# Patient Record
Sex: MALE | Race: OTHER | Employment: UNEMPLOYED | URBAN - METROPOLITAN AREA
[De-identification: names, ages, dates, MRNs, and addresses within clinical notes are randomized per-mention and may not be internally consistent; named-entity substitution may affect disease eponyms.]

---

## 2020-10-28 ENCOUNTER — HOSPITAL ENCOUNTER (EMERGENCY)
Age: 65
Discharge: HOME | End: 2020-10-29

## 2020-10-28 ENCOUNTER — APPOINTMENT (OUTPATIENT)
Dept: CT IMAGING | Age: 65
End: 2020-10-28

## 2020-10-28 LAB
ALBUMIN SERPL-MCNC: 4.2 GM/DL (ref 3.4–5)
ALP BLD-CCNC: 109 IU/L (ref 40–129)
ALT SERPL-CCNC: 45 U/L (ref 10–40)
ANION GAP SERPL CALCULATED.3IONS-SCNC: 13 MMOL/L (ref 4–16)
AST SERPL-CCNC: 36 IU/L (ref 15–37)
BACTERIA: NEGATIVE /HPF
BASOPHILS ABSOLUTE: 0 K/CU MM
BASOPHILS RELATIVE PERCENT: 0.5 % (ref 0–1)
BILIRUB SERPL-MCNC: 0.3 MG/DL (ref 0–1)
BILIRUBIN URINE: NEGATIVE MG/DL
BLOOD, URINE: ABNORMAL
BUN BLDV-MCNC: 20 MG/DL (ref 6–23)
CALCIUM SERPL-MCNC: 9.7 MG/DL (ref 8.3–10.6)
CHLORIDE BLD-SCNC: 101 MMOL/L (ref 99–110)
CLARITY: ABNORMAL
CO2: 24 MMOL/L (ref 21–32)
COLOR: ABNORMAL
CREAT SERPL-MCNC: 1 MG/DL (ref 0.9–1.3)
DIFFERENTIAL TYPE: ABNORMAL
EOSINOPHILS ABSOLUTE: 0.1 K/CU MM
EOSINOPHILS RELATIVE PERCENT: 2.2 % (ref 0–3)
GFR AFRICAN AMERICAN: >60 ML/MIN/1.73M2
GFR NON-AFRICAN AMERICAN: >60 ML/MIN/1.73M2
GLUCOSE BLD-MCNC: 103 MG/DL (ref 70–99)
GLUCOSE, URINE: 50 MG/DL
HCT VFR BLD CALC: 43.2 % (ref 42–52)
HEMOGLOBIN: 13.9 GM/DL (ref 13.5–18)
IMMATURE NEUTROPHIL %: 0.5 % (ref 0–0.43)
KETONES, URINE: NEGATIVE MG/DL
LEUKOCYTE ESTERASE, URINE: NEGATIVE
LYMPHOCYTES ABSOLUTE: 1.9 K/CU MM
LYMPHOCYTES RELATIVE PERCENT: 30.6 % (ref 24–44)
MCH RBC QN AUTO: 28.9 PG (ref 27–31)
MCHC RBC AUTO-ENTMCNC: 32.2 % (ref 32–36)
MCV RBC AUTO: 89.8 FL (ref 78–100)
MONOCYTES ABSOLUTE: 0.5 K/CU MM
MONOCYTES RELATIVE PERCENT: 7.1 % (ref 0–4)
NITRITE URINE, QUANTITATIVE: NEGATIVE
NUCLEATED RBC %: 0 %
PDW BLD-RTO: 14 % (ref 11.7–14.9)
PH, URINE: 6 (ref 5–8)
PLATELET # BLD: 300 K/CU MM (ref 140–440)
PMV BLD AUTO: 9.6 FL (ref 7.5–11.1)
POTASSIUM SERPL-SCNC: 3.6 MMOL/L (ref 3.5–5.1)
PROTEIN UA: 100 MG/DL
RBC # BLD: 4.81 M/CU MM (ref 4.6–6.2)
RBC URINE: ABNORMAL /HPF (ref 0–3)
SEGMENTED NEUTROPHILS ABSOLUTE COUNT: 3.7 K/CU MM
SEGMENTED NEUTROPHILS RELATIVE PERCENT: 59.1 % (ref 36–66)
SODIUM BLD-SCNC: 138 MMOL/L (ref 135–145)
SPECIFIC GRAVITY UA: 1.01 (ref 1–1.03)
TOTAL IMMATURE NEUTOROPHIL: 0.03 K/CU MM
TOTAL NUCLEATED RBC: 0 K/CU MM
TOTAL PROTEIN: 8.1 GM/DL (ref 6.4–8.2)
TRICHOMONAS: ABNORMAL /HPF
UROBILINOGEN, URINE: NORMAL MG/DL (ref 0.2–1)
WBC # BLD: 6.3 K/CU MM (ref 4–10.5)
WBC UA: 654 /HPF (ref 0–2)

## 2020-10-28 PROCEDURE — 81001 URINALYSIS AUTO W/SCOPE: CPT

## 2020-10-28 PROCEDURE — 87077 CULTURE AEROBIC IDENTIFY: CPT

## 2020-10-28 PROCEDURE — 96361 HYDRATE IV INFUSION ADD-ON: CPT

## 2020-10-28 PROCEDURE — 4500000027

## 2020-10-28 PROCEDURE — 85025 COMPLETE CBC W/AUTO DIFF WBC: CPT

## 2020-10-28 PROCEDURE — 99284 EMERGENCY DEPT VISIT MOD MDM: CPT

## 2020-10-28 PROCEDURE — 2580000003 HC RX 258: Performed by: PHYSICIAN ASSISTANT

## 2020-10-28 PROCEDURE — 6360000004 HC RX CONTRAST MEDICATION: Performed by: PHYSICIAN ASSISTANT

## 2020-10-28 PROCEDURE — 87086 URINE CULTURE/COLONY COUNT: CPT

## 2020-10-28 PROCEDURE — 80053 COMPREHEN METABOLIC PANEL: CPT

## 2020-10-28 PROCEDURE — 96360 HYDRATION IV INFUSION INIT: CPT

## 2020-10-28 PROCEDURE — 74177 CT ABD & PELVIS W/CONTRAST: CPT

## 2020-10-28 PROCEDURE — 87186 SC STD MICRODIL/AGAR DIL: CPT

## 2020-10-28 RX ORDER — SODIUM CHLORIDE 0.9 % (FLUSH) 0.9 %
10 SYRINGE (ML) INJECTION 2 TIMES DAILY
Status: DISCONTINUED | OUTPATIENT
Start: 2020-10-28 | End: 2020-10-29 | Stop reason: HOSPADM

## 2020-10-28 RX ORDER — SODIUM CHLORIDE 9 MG/ML
INJECTION, SOLUTION INTRAVENOUS CONTINUOUS
Status: DISCONTINUED | OUTPATIENT
Start: 2020-10-28 | End: 2020-10-29 | Stop reason: HOSPADM

## 2020-10-28 RX ADMIN — SODIUM CHLORIDE, PRESERVATIVE FREE 10 ML: 5 INJECTION INTRAVENOUS at 23:17

## 2020-10-28 RX ADMIN — IOPAMIDOL 80 ML: 755 INJECTION, SOLUTION INTRAVENOUS at 23:17

## 2020-10-28 RX ADMIN — SODIUM CHLORIDE: 9 INJECTION, SOLUTION INTRAVENOUS at 22:13

## 2020-10-29 VITALS
HEART RATE: 83 BPM | OXYGEN SATURATION: 97 % | SYSTOLIC BLOOD PRESSURE: 174 MMHG | DIASTOLIC BLOOD PRESSURE: 110 MMHG | TEMPERATURE: 98 F | RESPIRATION RATE: 12 BRPM | WEIGHT: 157 LBS

## 2020-10-29 RX ORDER — CEPHALEXIN 500 MG/1
500 CAPSULE ORAL 4 TIMES DAILY
Qty: 28 CAPSULE | Refills: 0 | Status: ON HOLD | OUTPATIENT
Start: 2020-10-29 | End: 2020-11-01 | Stop reason: HOSPADM

## 2020-10-29 NOTE — ED NOTES
used on phone. Pt says they are having painful urination and blood in his urine. Says this has been going on for a week.  Says he maryan szymanski at this time     JIMMY Ledbetter  10/28/20 2124

## 2020-10-29 NOTE — ED NOTES
CT Results  Impression    1. No acute intra-abdominal abnormality. 2. 4.2 cm diameter bladder calculus present.  At the superior aspect of the    calculus there is a intraluminal bladder 4.2 cm soft tissue mass.  This could    represent a possible blood clot versus possible primary malignancy. Recommend urologic consultation and cystoscopy. 3. There is a small amount of free air within the bladder which could relate    to recent instrumentation versus possible cystitis. 4. Severe prostatomegaly and evidence of bladder outlet obstruction. 5. 3.2 cm infrarenal abdominal aortic aneurysm.  Recommended follow-up every    3 years.         RECOMMENDATIONS:    For management of fusiform AAA:         3.0-3.4 cm AAA, recommend follow-up every 3 years.         * For management of saccular abdominal aortic aneurysms of any size,    recommend vascular consultation.         Note:  For AAA enlargement of >0.5 cm in 6 months or >1 cm in 1 year,    recommend vascular consultation.         References: J Am Tita Radiol 2013; 10(10):789-794; J Vasc Surg.  2018; 67:2-77                Jewels Fischer RN  10/28/20 7733

## 2020-10-29 NOTE — ED TRIAGE NOTES
Pt presents to the ED with complaints of hematuria for about 3 days. Pt states that he is not in pain currently, but does have pain when he urinates. Pt reports about a month ago, he was having trouble urinating, he went to the Dr and they drained his urine with a catheter and has not had problems since. Pt speaks [place].

## 2020-10-29 NOTE — ED PROVIDER NOTES
Triage Chief Complaint:   Hematuria    Te-Moak:  Today in the ED I had the pleasure of caring for Belia Almaguer who is a 71 y.o. male that presents today to the emergency department complaining of hematuria. Intermittently over the last 1 week however got significantly worse today. He does endorse a little bit of discomfort when he urinates. As well as urinary hesitancy. And he states that his last time trying to pee he was not able to urinate. He denies any abdominal pain or flank pain. No history of kidney stones. No trauma to the abdomen or penis. No testicular or penile pain. No recent fever chills nausea vomiting or diarrhea. ROS:  REVIEW OF SYSTEMS    At least 10 systems reviewed      All other review of systems are negative  See HPI and nursing notes for additional information       History reviewed. No pertinent past medical history. History reviewed. No pertinent surgical history. History reviewed. No pertinent family history.   Social History     Socioeconomic History    Marital status: Unknown     Spouse name: Not on file    Number of children: Not on file    Years of education: Not on file    Highest education level: Not on file   Occupational History    Not on file   Social Needs    Financial resource strain: Not on file    Food insecurity     Worry: Not on file     Inability: Not on file    Transportation needs     Medical: Not on file     Non-medical: Not on file   Tobacco Use    Smoking status: Never Smoker    Smokeless tobacco: Never Used   Substance and Sexual Activity    Alcohol use: Not Currently    Drug use: Never    Sexual activity: Not on file   Lifestyle    Physical activity     Days per week: Not on file     Minutes per session: Not on file    Stress: Not on file   Relationships    Social connections     Talks on phone: Not on file     Gets together: Not on file     Attends Scientologist service: Not on file     Active member of club or organization: Not on file Attends meetings of clubs or organizations: Not on file     Relationship status: Not on file    Intimate partner violence     Fear of current or ex partner: Not on file     Emotionally abused: Not on file     Physically abused: Not on file     Forced sexual activity: Not on file   Other Topics Concern    Not on file   Social History Narrative    Not on file     Current Facility-Administered Medications   Medication Dose Route Frequency Provider Last Rate Last Dose    0.9 % sodium chloride infusion   Intravenous Continuous Shawn Morris PA-C 100 mL/hr at 10/28/20 2213      sodium chloride flush 0.9 % injection 10 mL  10 mL Intravenous BID Shawn Morris PA-C   10 mL at 10/28/20 2317     Current Outpatient Medications   Medication Sig Dispense Refill    cephALEXin (KEFLEX) 500 MG capsule Take 1 capsule by mouth 4 times daily for 7 days 28 capsule 0     No Known Allergies    Nursing Notes Reviewed    Physical Exam:  ED Triage Vitals [10/28/20 2016]   Enc Vitals Group      BP (!) 207/122      Pulse 65      Resp 18      Temp 98 °F (36.7 °C)      Temp Source Oral      SpO2 99 %      Weight 157 lb (71.2 kg)      Height       Head Circumference       Peak Flow       Pain Score       Pain Loc       Pain Edu? Excl. in [address]? General :Patient is awake alert oriented person place and time no acute distress nontoxic appearing  HEENT: Pupils are equally round and reactive to light extraocular motors are intact conjunctivae clear sclerae white there is no injection no icterus. Nose without any rhinorrhea or epistaxis. Oral mucosa is moist no exudate buccal mucosa shows no ulcerations. Uvula is midline    Neck: Neck is supple full range of motion trachea midline thyroid nonpalpable  Cardiac: Heart regular rate rhythm no murmurs rubs clicks or gallops  Lungs: Lungs are clear to auscultation there is no wheezing rhonchi or rales. There is no use of accessory muscles no nasal flaring identified.   Chest wall: There is no tenderness to palpation over the chest wall or over ribs  Abdomen: Abdomen is soft nontender nondistended. There is no firm or pulsatile masses no rebound rigidity or guarding negative Loya's negative McBurney, no peritoneal signs  Suprapubic:  there is no tenderness to palpation over the external bladder   Musculoskeletal: 5 out of 5 strength in all 4 extremities full flexion extension abduction and adduction supination pronation of all extremities and all digits. No obvious muscle atrophy is noted. No focal muscle deficits are appreciated  Dermatology: Skin is warm and dry there is no obvious abscesses lacerations or lesions noted  Psych: Mentation is grossly normal cognition is grossly normal. Affect is appropriate  Neuro: Motor intact sensory intact cranial nerves II through XII are intact level of consciousness is normal cerebellar function is normal reflexes are grossly normal. No evidence of incontinence or loss of bowel or bladder no saddle anesthesia noted Lymphatic: There is no submandibular or cervical adenopathy appreciated.         I have reviewed and interpreted all of the currently available lab results from this visit (if applicable):  Results for orders placed or performed during the hospital encounter of 10/28/20   Urinalysis with microscopic   Result Value Ref Range    Color, UA RED (A) YELLOW    Clarity, UA SLIGHTLY CLOUDY (A) CLEAR    Glucose, Urine 50 (A) NEGATIVE MG/DL    Bilirubin Urine NEGATIVE NEGATIVE MG/DL    Ketones, Urine NEGATIVE NEGATIVE MG/DL    Specific Gravity, UA 1.015 1.001 - 1.035    Blood, Urine MODERATE (A) NEGATIVE    pH, Urine 6.0 5.0 - 8.0    Protein,  (A) NEGATIVE MG/DL    Urobilinogen, Urine NORMAL 0.2 - 1.0 MG/DL    Nitrite Urine, Quantitative NEGATIVE NEGATIVE    Leukocyte Esterase, Urine NEGATIVE NEGATIVE    RBC, UA 57,225 (H) 0 - 3 /HPF    WBC,  (H) 0 - 2 /HPF    Bacteria, UA NEGATIVE NEGATIVE /HPF    Trichomonas, UA NONE SEEN NONE SEEN /HPF   CBC auto diff   Result Value Ref Range    WBC 6.3 4.0 - 10.5 K/CU MM    RBC 4.81 4.6 - 6.2 M/CU MM    Hemoglobin 13.9 13.5 - 18.0 GM/DL    Hematocrit 43.2 42 - 52 %    MCV 89.8 78 - 100 FL    MCH 28.9 27 - 31 PG    MCHC 32.2 32.0 - 36.0 %    RDW 14.0 11.7 - 14.9 %    Platelets 419 380 - 296 K/CU MM    MPV 9.6 7.5 - 11.1 FL    Differential Type AUTOMATED DIFFERENTIAL     Segs Relative 59.1 36 - 66 %    Lymphocytes % 30.6 24 - 44 %    Monocytes % 7.1 (H) 0 - 4 %    Eosinophils % 2.2 0 - 3 %    Basophils % 0.5 0 - 1 %    Segs Absolute 3.7 K/CU MM    Lymphocytes Absolute 1.9 K/CU MM    Monocytes Absolute 0.5 K/CU MM    Eosinophils Absolute 0.1 K/CU MM    Basophils Absolute 0.0 K/CU MM    Nucleated RBC % 0.0 %    Total Nucleated RBC 0.0 K/CU MM    Total Immature Neutrophil 0.03 K/CU MM    Immature Neutrophil % 0.5 (H) 0 - 0.43 %   CMP   Result Value Ref Range    Sodium 138 135 - 145 MMOL/L    Potassium 3.6 3.5 - 5.1 MMOL/L    Chloride 101 99 - 110 mMol/L    CO2 24 21 - 32 MMOL/L    BUN 20 6 - 23 MG/DL    CREATININE 1.0 0.9 - 1.3 MG/DL    Glucose 103 (H) 70 - 99 MG/DL    Calcium 9.7 8.3 - 10.6 MG/DL    Alb 4.2 3.4 - 5.0 GM/DL    Total Protein 8.1 6.4 - 8.2 GM/DL    Total Bilirubin 0.3 0.0 - 1.0 MG/DL    ALT 45 (H) 10 - 40 U/L    AST 36 15 - 37 IU/L    Alkaline Phosphatase 109 40 - 129 IU/L    GFR Non-African American >60 >60 mL/min/1.73m2    GFR African American >60 >60 mL/min/1.73m2    Anion Gap 13 4 - 16      Radiographs (if obtained):  [] The following radiograph was interpreted by myself in the absence of a radiologist:   [] Radiologist's Report Reviewed:  CT ABDOMEN PELVIS W IV CONTRAST   Preliminary Result   1. No acute intra-abdominal abnormality. 2. 4.2 cm diameter bladder calculus present. At the superior aspect of the   calculus there is a intraluminal bladder 4.2 cm soft tissue mass. This could   represent a possible blood clot versus possible primary malignancy.    Recommend urologic consultation and this possible mass. Patient is also educated that should he develop retention he is to come right back here to the ED       I independently managed patient today in the ED        BP (!) 183/109   Pulse 70   Temp 98 °F (36.7 °C) (Oral)   Resp 12   Wt 157 lb (71.2 kg)   SpO2 100%       Clinical Impression:  1. Acute cystitis with hematuria    2. Bladder mass    3. Bladder stone    4. Enlarged prostate    5. Abdominal aortic aneurysm (AAA) without rupture [facility])        Disposition referral (if applicable):  [phone]    Call in 1 day      [facility] Emergency Department  [phone]    If symptoms worsen or persist    Disposition medications (if applicable):  New Prescriptions    CEPHALEXIN (KEFLEX) 500 MG CAPSULE    Take 1 capsule by mouth 4 times daily for 7 days         Comment: Please note this report has been produced using speech recognition software and may contain errors related to that system including errors in grammar, punctuation, and spelling, as well as words and phrases that may be inappropriate. If there are any questions or concerns please feel free to contact the dictating provider for clarification.       Royal Loredo PA-C \      Royal Lored[place], [place]  10/29/20 9727

## 2020-10-30 ENCOUNTER — HOSPITAL ENCOUNTER (INPATIENT)
Age: 65
LOS: 2 days | Discharge: HOME | DRG: 726 | End: 2020-11-01
Attending: EMERGENCY MEDICINE | Admitting: INTERNAL MEDICINE

## 2020-10-30 ENCOUNTER — ANESTHESIA (OUTPATIENT)
Dept: OPERATING ROOM | Age: 65
DRG: 726 | End: 2020-10-30

## 2020-10-30 ENCOUNTER — ANESTHESIA EVENT (OUTPATIENT)
Dept: OPERATING ROOM | Age: 65
DRG: 726 | End: 2020-10-30

## 2020-10-30 VITALS
SYSTOLIC BLOOD PRESSURE: 105 MMHG | RESPIRATION RATE: 13 BRPM | TEMPERATURE: 98.6 F | OXYGEN SATURATION: 96 % | DIASTOLIC BLOOD PRESSURE: 90 MMHG

## 2020-10-30 PROBLEM — R33.8 CLOT RETENTION OF URINE: Status: ACTIVE | Noted: 2020-10-30

## 2020-10-30 LAB
ALBUMIN SERPL-MCNC: 3.9 GM/DL (ref 3.4–5)
ALP BLD-CCNC: 98 IU/L (ref 40–129)
ALT SERPL-CCNC: 32 U/L (ref 10–40)
ANION GAP SERPL CALCULATED.3IONS-SCNC: 13 MMOL/L (ref 4–16)
AST SERPL-CCNC: 27 IU/L (ref 15–37)
BASOPHILS ABSOLUTE: 0 K/CU MM
BASOPHILS RELATIVE PERCENT: 0.3 % (ref 0–1)
BILIRUB SERPL-MCNC: 0.4 MG/DL (ref 0–1)
BUN BLDV-MCNC: 19 MG/DL (ref 6–23)
CALCIUM SERPL-MCNC: 9.4 MG/DL (ref 8.3–10.6)
CHLORIDE BLD-SCNC: 98 MMOL/L (ref 99–110)
CO2: 24 MMOL/L (ref 21–32)
CREAT SERPL-MCNC: 1.2 MG/DL (ref 0.9–1.3)
DIFFERENTIAL TYPE: ABNORMAL
EOSINOPHILS ABSOLUTE: 0 K/CU MM
EOSINOPHILS RELATIVE PERCENT: 0 % (ref 0–3)
GFR AFRICAN AMERICAN: >60 ML/MIN/1.73M2
GFR NON-AFRICAN AMERICAN: >60 ML/MIN/1.73M2
GLUCOSE BLD-MCNC: 134 MG/DL (ref 70–99)
HCT VFR BLD CALC: 40.5 % (ref 42–52)
HEMOGLOBIN: 13.1 GM/DL (ref 13.5–18)
IMMATURE NEUTROPHIL %: 0.3 % (ref 0–0.43)
INR BLD: 1.02 INDEX
LACTATE: 1.4 MMOL/L (ref 0.4–2)
LACTATE: 2.3 MMOL/L (ref 0.4–2)
LYMPHOCYTES ABSOLUTE: 0.9 K/CU MM
LYMPHOCYTES RELATIVE PERCENT: 13.7 % (ref 24–44)
MCH RBC QN AUTO: 29.1 PG (ref 27–31)
MCHC RBC AUTO-ENTMCNC: 32.3 % (ref 32–36)
MCV RBC AUTO: 90 FL (ref 78–100)
MONOCYTES ABSOLUTE: 0.3 K/CU MM
MONOCYTES RELATIVE PERCENT: 3.9 % (ref 0–4)
NUCLEATED RBC %: 0 %
PDW BLD-RTO: 13.7 % (ref 11.7–14.9)
PLATELET # BLD: 281 K/CU MM (ref 140–440)
PMV BLD AUTO: 9.5 FL (ref 7.5–11.1)
POTASSIUM SERPL-SCNC: 3.5 MMOL/L (ref 3.5–5.1)
PROTHROMBIN TIME: 12.4 SECONDS (ref 11.7–14.5)
RBC # BLD: 4.5 M/CU MM (ref 4.6–6.2)
SARS-COV-2, NAAT: NOT DETECTED
SEGMENTED NEUTROPHILS ABSOLUTE COUNT: 5.2 K/CU MM
SEGMENTED NEUTROPHILS RELATIVE PERCENT: 81.8 % (ref 36–66)
SODIUM BLD-SCNC: 135 MMOL/L (ref 135–145)
SOURCE: NORMAL
TOTAL IMMATURE NEUTOROPHIL: 0.02 K/CU MM
TOTAL NUCLEATED RBC: 0 K/CU MM
TOTAL PROTEIN: 7.3 GM/DL (ref 6.4–8.2)
WBC # BLD: 6.4 K/CU MM (ref 4–10.5)

## 2020-10-30 PROCEDURE — 1200000000 HC SEMI PRIVATE

## 2020-10-30 PROCEDURE — 85610 PROTHROMBIN TIME: CPT

## 2020-10-30 PROCEDURE — 3700000001 HC ADD 15 MINUTES (ANESTHESIA): Performed by: UROLOGY

## 2020-10-30 PROCEDURE — 80053 COMPREHEN METABOLIC PANEL: CPT

## 2020-10-30 PROCEDURE — 6360000002 HC RX W HCPCS: Performed by: EMERGENCY MEDICINE

## 2020-10-30 PROCEDURE — 2580000003 HC RX 258: Performed by: UROLOGY

## 2020-10-30 PROCEDURE — 2500000003 HC RX 250 WO HCPCS: Performed by: ANESTHESIOLOGY

## 2020-10-30 PROCEDURE — 2580000003 HC RX 258: Performed by: EMERGENCY MEDICINE

## 2020-10-30 PROCEDURE — 96365 THER/PROPH/DIAG IV INF INIT: CPT

## 2020-10-30 PROCEDURE — 3600000013 HC SURGERY LEVEL 3 ADDTL 15MIN: Performed by: UROLOGY

## 2020-10-30 PROCEDURE — 7100000000 HC PACU RECOVERY - FIRST 15 MIN: Performed by: UROLOGY

## 2020-10-30 PROCEDURE — 6370000000 HC RX 637 (ALT 250 FOR IP): Performed by: EMERGENCY MEDICINE

## 2020-10-30 PROCEDURE — 2580000003 HC RX 258: Performed by: NURSE ANESTHETIST, CERTIFIED REGISTERED

## 2020-10-30 PROCEDURE — 4500000027

## 2020-10-30 PROCEDURE — 88300 SURGICAL PATH GROSS: CPT

## 2020-10-30 PROCEDURE — 0TCB8ZZ EXTIRPATION OF MATTER FROM BLADDER, VIA NATURAL OR ARTIFICIAL OPENING ENDOSCOPIC: ICD-10-PCS | Performed by: UROLOGY

## 2020-10-30 PROCEDURE — U0002 COVID-19 LAB TEST NON-CDC: HCPCS

## 2020-10-30 PROCEDURE — 6360000002 HC RX W HCPCS: Performed by: UROLOGY

## 2020-10-30 PROCEDURE — 2709999900 HC NON-CHARGEABLE SUPPLY: Performed by: UROLOGY

## 2020-10-30 PROCEDURE — 7100000001 HC PACU RECOVERY - ADDTL 15 MIN: Performed by: UROLOGY

## 2020-10-30 PROCEDURE — 3700000000 HC ANESTHESIA ATTENDED CARE: Performed by: UROLOGY

## 2020-10-30 PROCEDURE — 6360000002 HC RX W HCPCS: Performed by: ANESTHESIOLOGY

## 2020-10-30 PROCEDURE — 99284 EMERGENCY DEPT VISIT MOD MDM: CPT

## 2020-10-30 PROCEDURE — 6360000002 HC RX W HCPCS: Performed by: NURSE ANESTHETIST, CERTIFIED REGISTERED

## 2020-10-30 PROCEDURE — 82360 CALCULUS ASSAY QUANT: CPT

## 2020-10-30 PROCEDURE — 83605 ASSAY OF LACTIC ACID: CPT

## 2020-10-30 PROCEDURE — 85025 COMPLETE CBC W/AUTO DIFF WBC: CPT

## 2020-10-30 PROCEDURE — 3600000003 HC SURGERY LEVEL 3 BASE: Performed by: UROLOGY

## 2020-10-30 RX ORDER — TAMSULOSIN HYDROCHLORIDE 0.4 MG/1
0.4 CAPSULE ORAL DAILY
Status: DISCONTINUED | OUTPATIENT
Start: 2020-10-30 | End: 2020-11-01 | Stop reason: HOSPADM

## 2020-10-30 RX ORDER — POTASSIUM CHLORIDE 20 MEQ/1
40 TABLET, EXTENDED RELEASE ORAL PRN
Status: DISCONTINUED | OUTPATIENT
Start: 2020-10-30 | End: 2020-11-01 | Stop reason: HOSPADM

## 2020-10-30 RX ORDER — POTASSIUM CHLORIDE 7.45 MG/ML
10 INJECTION INTRAVENOUS PRN
Status: DISCONTINUED | OUTPATIENT
Start: 2020-10-30 | End: 2020-11-01 | Stop reason: HOSPADM

## 2020-10-30 RX ORDER — ACETAMINOPHEN 325 MG/1
650 TABLET ORAL EVERY 6 HOURS PRN
Status: DISCONTINUED | OUTPATIENT
Start: 2020-10-30 | End: 2020-11-01 | Stop reason: HOSPADM

## 2020-10-30 RX ORDER — LIDOCAINE HYDROCHLORIDE 20 MG/ML
JELLY TOPICAL PRN
Status: DISCONTINUED | OUTPATIENT
Start: 2020-10-30 | End: 2020-11-01 | Stop reason: HOSPADM

## 2020-10-30 RX ORDER — FUROSEMIDE 10 MG/ML
10 INJECTION INTRAMUSCULAR; INTRAVENOUS ONCE
Status: COMPLETED | OUTPATIENT
Start: 2020-10-30 | End: 2020-10-30

## 2020-10-30 RX ORDER — MAGNESIUM SULFATE IN WATER 40 MG/ML
2 INJECTION, SOLUTION INTRAVENOUS PRN
Status: DISCONTINUED | OUTPATIENT
Start: 2020-10-30 | End: 2020-11-01 | Stop reason: HOSPADM

## 2020-10-30 RX ORDER — LIDOCAINE HYDROCHLORIDE 20 MG/ML
INJECTION, SOLUTION INTRAVENOUS PRN
Status: DISCONTINUED | OUTPATIENT
Start: 2020-10-30 | End: 2020-10-30 | Stop reason: SDUPTHER

## 2020-10-30 RX ORDER — DEXAMETHASONE SODIUM PHOSPHATE 4 MG/ML
INJECTION, SOLUTION INTRA-ARTICULAR; INTRALESIONAL; INTRAMUSCULAR; INTRAVENOUS; SOFT TISSUE PRN
Status: DISCONTINUED | OUTPATIENT
Start: 2020-10-30 | End: 2020-10-30 | Stop reason: SDUPTHER

## 2020-10-30 RX ORDER — FENTANYL CITRATE 50 UG/ML
25 INJECTION, SOLUTION INTRAMUSCULAR; INTRAVENOUS EVERY 5 MIN PRN
Status: DISCONTINUED | OUTPATIENT
Start: 2020-10-30 | End: 2020-10-30 | Stop reason: HOSPADM

## 2020-10-30 RX ORDER — HYDRALAZINE HYDROCHLORIDE 20 MG/ML
5 INJECTION INTRAMUSCULAR; INTRAVENOUS EVERY 10 MIN PRN
Status: DISCONTINUED | OUTPATIENT
Start: 2020-10-30 | End: 2020-10-30 | Stop reason: HOSPADM

## 2020-10-30 RX ORDER — ONDANSETRON 2 MG/ML
INJECTION INTRAMUSCULAR; INTRAVENOUS PRN
Status: DISCONTINUED | OUTPATIENT
Start: 2020-10-30 | End: 2020-10-30 | Stop reason: SDUPTHER

## 2020-10-30 RX ORDER — PROMETHAZINE HYDROCHLORIDE 25 MG/ML
6.25 INJECTION, SOLUTION INTRAMUSCULAR; INTRAVENOUS
Status: DISCONTINUED | OUTPATIENT
Start: 2020-10-30 | End: 2020-10-30 | Stop reason: HOSPADM

## 2020-10-30 RX ORDER — FENTANYL CITRATE 50 UG/ML
INJECTION, SOLUTION INTRAMUSCULAR; INTRAVENOUS PRN
Status: DISCONTINUED | OUTPATIENT
Start: 2020-10-30 | End: 2020-10-30 | Stop reason: SDUPTHER

## 2020-10-30 RX ORDER — PROPOFOL 10 MG/ML
INJECTION, EMULSION INTRAVENOUS PRN
Status: DISCONTINUED | OUTPATIENT
Start: 2020-10-30 | End: 2020-10-30 | Stop reason: SDUPTHER

## 2020-10-30 RX ORDER — ACETAMINOPHEN 650 MG/1
650 SUPPOSITORY RECTAL EVERY 6 HOURS PRN
Status: DISCONTINUED | OUTPATIENT
Start: 2020-10-30 | End: 2020-11-01 | Stop reason: HOSPADM

## 2020-10-30 RX ORDER — HYDROMORPHONE HCL 110MG/55ML
0.5 PATIENT CONTROLLED ANALGESIA SYRINGE INTRAVENOUS EVERY 5 MIN PRN
Status: DISCONTINUED | OUTPATIENT
Start: 2020-10-30 | End: 2020-10-30 | Stop reason: HOSPADM

## 2020-10-30 RX ORDER — POLYETHYLENE GLYCOL 3350 17 G/17G
17 POWDER, FOR SOLUTION ORAL DAILY PRN
Status: DISCONTINUED | OUTPATIENT
Start: 2020-10-30 | End: 2020-11-01 | Stop reason: HOSPADM

## 2020-10-30 RX ORDER — ONDANSETRON 2 MG/ML
4 INJECTION INTRAMUSCULAR; INTRAVENOUS EVERY 6 HOURS PRN
Status: DISCONTINUED | OUTPATIENT
Start: 2020-10-30 | End: 2020-11-01 | Stop reason: HOSPADM

## 2020-10-30 RX ORDER — 0.9 % SODIUM CHLORIDE 0.9 %
1000 INTRAVENOUS SOLUTION INTRAVENOUS ONCE
Status: COMPLETED | OUTPATIENT
Start: 2020-10-30 | End: 2020-10-30

## 2020-10-30 RX ORDER — MORPHINE SULFATE 2 MG/ML
2 INJECTION, SOLUTION INTRAMUSCULAR; INTRAVENOUS EVERY 5 MIN PRN
Status: DISCONTINUED | OUTPATIENT
Start: 2020-10-30 | End: 2020-10-30 | Stop reason: HOSPADM

## 2020-10-30 RX ORDER — SODIUM CHLORIDE 0.9 % (FLUSH) 0.9 %
10 SYRINGE (ML) INJECTION EVERY 12 HOURS SCHEDULED
Status: DISCONTINUED | OUTPATIENT
Start: 2020-10-30 | End: 2020-11-01 | Stop reason: HOSPADM

## 2020-10-30 RX ORDER — DEXTROSE AND SODIUM CHLORIDE 5; .45 G/100ML; G/100ML
INJECTION, SOLUTION INTRAVENOUS CONTINUOUS
Status: DISCONTINUED | OUTPATIENT
Start: 2020-10-30 | End: 2020-10-31

## 2020-10-30 RX ORDER — SODIUM CHLORIDE, SODIUM LACTATE, POTASSIUM CHLORIDE, CALCIUM CHLORIDE 600; 310; 30; 20 MG/100ML; MG/100ML; MG/100ML; MG/100ML
INJECTION, SOLUTION INTRAVENOUS CONTINUOUS PRN
Status: DISCONTINUED | OUTPATIENT
Start: 2020-10-30 | End: 2020-10-30 | Stop reason: SDUPTHER

## 2020-10-30 RX ORDER — SODIUM CHLORIDE 0.9 % (FLUSH) 0.9 %
10 SYRINGE (ML) INJECTION PRN
Status: DISCONTINUED | OUTPATIENT
Start: 2020-10-30 | End: 2020-11-01 | Stop reason: HOSPADM

## 2020-10-30 RX ORDER — PHENYLEPHRINE HYDROCHLORIDE 10 MG/ML
INJECTION INTRAVENOUS PRN
Status: DISCONTINUED | OUTPATIENT
Start: 2020-10-30 | End: 2020-10-30 | Stop reason: SDUPTHER

## 2020-10-30 RX ORDER — HYDROMORPHONE HCL 110MG/55ML
0.25 PATIENT CONTROLLED ANALGESIA SYRINGE INTRAVENOUS EVERY 5 MIN PRN
Status: DISCONTINUED | OUTPATIENT
Start: 2020-10-30 | End: 2020-10-30 | Stop reason: HOSPADM

## 2020-10-30 RX ORDER — LABETALOL HYDROCHLORIDE 5 MG/ML
5 INJECTION, SOLUTION INTRAVENOUS EVERY 10 MIN PRN
Status: DISCONTINUED | OUTPATIENT
Start: 2020-10-30 | End: 2020-10-30 | Stop reason: HOSPADM

## 2020-10-30 RX ADMIN — FENTANYL CITRATE 50 MCG: 50 INJECTION INTRAMUSCULAR; INTRAVENOUS at 16:27

## 2020-10-30 RX ADMIN — SODIUM CHLORIDE, POTASSIUM CHLORIDE, SODIUM LACTATE AND CALCIUM CHLORIDE: 600; 310; 30; 20 INJECTION, SOLUTION INTRAVENOUS at 17:29

## 2020-10-30 RX ADMIN — FENTANYL CITRATE 50 MCG: 50 INJECTION INTRAMUSCULAR; INTRAVENOUS at 16:26

## 2020-10-30 RX ADMIN — LABETALOL HYDROCHLORIDE 5 MG: 5 INJECTION, SOLUTION INTRAVENOUS at 18:53

## 2020-10-30 RX ADMIN — SODIUM CHLORIDE 1000 ML: 9 INJECTION, SOLUTION INTRAVENOUS at 08:09

## 2020-10-30 RX ADMIN — PHENYLEPHRINE HYDROCHLORIDE 50 MCG: 10 INJECTION INTRAVENOUS at 16:56

## 2020-10-30 RX ADMIN — PROPOFOL 100 MG: 10 INJECTION, EMULSION INTRAVENOUS at 16:23

## 2020-10-30 RX ADMIN — PROPOFOL 150 MG: 10 INJECTION, EMULSION INTRAVENOUS at 16:19

## 2020-10-30 RX ADMIN — FENTANYL CITRATE 50 MCG: 50 INJECTION INTRAMUSCULAR; INTRAVENOUS at 17:33

## 2020-10-30 RX ADMIN — FENTANYL CITRATE 25 MCG: 50 INJECTION INTRAMUSCULAR; INTRAVENOUS at 18:40

## 2020-10-30 RX ADMIN — CEFTRIAXONE 1 G: 1 INJECTION, POWDER, FOR SOLUTION INTRAMUSCULAR; INTRAVENOUS at 08:29

## 2020-10-30 RX ADMIN — PHENYLEPHRINE HYDROCHLORIDE 50 MCG: 10 INJECTION INTRAVENOUS at 16:58

## 2020-10-30 RX ADMIN — LIDOCAINE HYDROCHLORIDE 100 MG: 20 INJECTION, SOLUTION INTRAVENOUS at 16:19

## 2020-10-30 RX ADMIN — SODIUM CHLORIDE, POTASSIUM CHLORIDE, SODIUM LACTATE AND CALCIUM CHLORIDE: 600; 310; 30; 20 INJECTION, SOLUTION INTRAVENOUS at 16:11

## 2020-10-30 RX ADMIN — LIDOCAINE HYDROCHLORIDE 7 ML: 20 JELLY TOPICAL at 08:07

## 2020-10-30 RX ADMIN — LABETALOL HYDROCHLORIDE 5 MG: 5 INJECTION, SOLUTION INTRAVENOUS at 18:43

## 2020-10-30 RX ADMIN — FUROSEMIDE 10 MG: 10 INJECTION, SOLUTION INTRAMUSCULAR; INTRAVENOUS at 19:06

## 2020-10-30 RX ADMIN — PHENYLEPHRINE HYDROCHLORIDE 50 MCG: 10 INJECTION INTRAVENOUS at 16:53

## 2020-10-30 RX ADMIN — FENTANYL CITRATE 50 MCG: 50 INJECTION INTRAMUSCULAR; INTRAVENOUS at 17:35

## 2020-10-30 RX ADMIN — DEXAMETHASONE SODIUM PHOSPHATE 4 MG: 4 INJECTION, SOLUTION INTRAMUSCULAR; INTRAVENOUS at 16:23

## 2020-10-30 RX ADMIN — DEXTROSE AND SODIUM CHLORIDE: 5; 450 INJECTION, SOLUTION INTRAVENOUS at 19:13

## 2020-10-30 RX ADMIN — FENTANYL CITRATE 25 MCG: 50 INJECTION INTRAMUSCULAR; INTRAVENOUS at 19:05

## 2020-10-30 RX ADMIN — ONDANSETRON 4 MG: 2 INJECTION INTRAMUSCULAR; INTRAVENOUS at 16:23

## 2020-10-30 ASSESSMENT — PULMONARY FUNCTION TESTS
PIF_VALUE: 3
PIF_VALUE: 4
PIF_VALUE: 2
PIF_VALUE: 3
PIF_VALUE: 2
PIF_VALUE: 3
PIF_VALUE: 4
PIF_VALUE: 1
PIF_VALUE: 3
PIF_VALUE: 3
PIF_VALUE: 2
PIF_VALUE: 3
PIF_VALUE: 2
PIF_VALUE: 0
PIF_VALUE: 3
PIF_VALUE: 3
PIF_VALUE: 5
PIF_VALUE: 3
PIF_VALUE: 2
PIF_VALUE: 4
PIF_VALUE: 3
PIF_VALUE: 2
PIF_VALUE: 3
PIF_VALUE: 2
PIF_VALUE: 5
PIF_VALUE: 2
PIF_VALUE: 17
PIF_VALUE: 3
PIF_VALUE: 4
PIF_VALUE: 2
PIF_VALUE: 3
PIF_VALUE: 4
PIF_VALUE: 2
PIF_VALUE: 5
PIF_VALUE: 1
PIF_VALUE: 4
PIF_VALUE: 4
PIF_VALUE: 5
PIF_VALUE: 2
PIF_VALUE: 3
PIF_VALUE: 15
PIF_VALUE: 6
PIF_VALUE: 3
PIF_VALUE: 2
PIF_VALUE: 2
PIF_VALUE: 5
PIF_VALUE: 3
PIF_VALUE: 4
PIF_VALUE: 2
PIF_VALUE: 4
PIF_VALUE: 2
PIF_VALUE: 3
PIF_VALUE: 2
PIF_VALUE: 3
PIF_VALUE: 3
PIF_VALUE: 2
PIF_VALUE: 2
PIF_VALUE: 3
PIF_VALUE: 4
PIF_VALUE: 3
PIF_VALUE: 3
PIF_VALUE: 2
PIF_VALUE: 2
PIF_VALUE: 4
PIF_VALUE: 3
PIF_VALUE: 15
PIF_VALUE: 4
PIF_VALUE: 2
PIF_VALUE: 3
PIF_VALUE: 3
PIF_VALUE: 2
PIF_VALUE: 3
PIF_VALUE: 2
PIF_VALUE: 3
PIF_VALUE: 3
PIF_VALUE: 4
PIF_VALUE: 2
PIF_VALUE: 3
PIF_VALUE: 1
PIF_VALUE: 3
PIF_VALUE: 4
PIF_VALUE: 2
PIF_VALUE: 4
PIF_VALUE: 3
PIF_VALUE: 1
PIF_VALUE: 1
PIF_VALUE: 15
PIF_VALUE: 2
PIF_VALUE: 3
PIF_VALUE: 3
PIF_VALUE: 4
PIF_VALUE: 3
PIF_VALUE: 3
PIF_VALUE: 2
PIF_VALUE: 2
PIF_VALUE: 1
PIF_VALUE: 2
PIF_VALUE: 4
PIF_VALUE: 13
PIF_VALUE: 4

## 2020-10-30 ASSESSMENT — PAIN DESCRIPTION - PROGRESSION: CLINICAL_PROGRESSION: GRADUALLY IMPROVING

## 2020-10-30 ASSESSMENT — PAIN DESCRIPTION - DESCRIPTORS: DESCRIPTORS: PRESSURE

## 2020-10-30 ASSESSMENT — PAIN SCALES - GENERAL
PAINLEVEL_OUTOF10: 8
PAINLEVEL_OUTOF10: 0

## 2020-10-30 NOTE — H&P
[facility]ist History & Physical      Name: Ronny Narayan  PCP: No primary care provider on file. Date of Admission: 10/30/2020  Date of Service: Pt seen/examined on 10/30/2020     Chief Complaint:  Hematuria (seen recently for same, states its gotten worse, unable to f/u with urology )    History Of Present Illness:      Ronny Narayan is a 71 y.o. male that , who presented to ED with the above chief complaint. Patient presents with difficulty passing urine, notes has been passing blood and clots, associated with suprapubic abdominal pain and discomfort, denies any fever, chills, urinary symptoms. He was in the ED about 3 days ago, where he presented with hematuria, was diagnosed with cystitis, CT scan showed possible bladder mass and was treated with Keflex and was supposed to have follow-up with urology which he has not. He only started taking antibiotics yesterday night, before symptoms worsened. He is not on any medications or blood thinners. Denies any associated chest pain, shortness of breath, dizziness or palpitations, but notes being generally weak and tired. History is limited, patient is [place] speaking , history taking via  and review of chart. He lives in [facility], and is just here for holidays. Not reported to be on any medication or being treated for any illness. ED course summary:   The case was discussed with the ED provider . Allergies:  Patient has no known allergies. Medications Prior to Admission Reviewed:    Prior to Admission medications    Medication Sig Start Date End Date Taking? Authorizing Provider   cephALEXin (KEFLEX) 500 MG capsule Take 1 capsule by mouth 4 times daily for 7 days 10/29/20 11/5/20 Yes Iker Nuno PA-C       Past Medical History Reviewed:    Patient notes no medical history, is on any medication prior to this  Past Surgical History Reviewed:    No surgical history  Family History:  Reviewed in detail.  Positive as follows:  History reviewed. No pertinent family history. Social History:  The patient  reports that he has never smoked. He has never used smokeless tobacco. He reports previous alcohol use. He reports that he does not use drugs. TOBACCO:   reports that he has never smoked. He has never used smokeless tobacco.  ETOH:   reports previous alcohol use. Drugs:  reports no history of drug use. ROS:  At least 10-point review of systems reviewed and were negative except as stated above in HPI. Physical Exam:  Vitals:  /82   Pulse 75   Temp 98.1 °F (36.7 °C) (Oral)   Resp 20   Wt 160 lb (72.6 kg)   SpO2 97%   General: Well appearing, NAD, no distress  Eyes: NELLY. Normal conjunctiva. extraocular motors are intact, sclerae white, no injection, no icterus. ENT/Mouth: Normal appearing jaw and neck, no cervical lymphadenopathy or sinus tenderness. Clear oropharynx with moist mucous membrane. Cardiovascular:  normal rate, regular rhythm, normal S1, S2, no murmurs, rubs, clicks or gallops. There is no tenderness to palpation over the chest wall or over ribs. No peripheral edema. Pedal pulses 2+ bilaterally. Respiratory:CTA, no wheezes, rales or rhonchi, symmetric air movement. There is no use of accessory muscles no nasal flaring identified. Gastrointestinal: Soft, nontender, nondistended, no masses or organomegaly. Suprapubic: there is no tenderness to palpation over the external bladder    Genitourinary:  No CVA tenderness. Catheter in situ, draining bloody urine  Musculoskletal: No joint swelling, warmth, or tenderness. 5 out of 5 strength in all 4 extremities. No obvious muscle atrophy is noted. No focal muscle deficits are appreciated  Skin: Warm, Dry, Normal coloration and turgor, no rashes, no suspicious skin lesions noted. Neurologic: Normal speech, No focal findings or movement disorder noted.  No evidence of incontinence or loss of bowel or bladder.   Mental status:  Level of consciousness is normal. Alert, Oriented x3, Coherent, Appropriate affect, No agitation. Hematologic/Lymphatic: No cervical lymphadenopathy. Laboratory this visit:  Reviewed  Recent Labs     10/28/20  2138 10/30/20  0720   WBC 6.3 6.4   HGB 13.9 13.1*   HCT 43.2 40.5*    281      Recent Labs     10/28/20  2138 10/30/20  0720    135   K 3.6 3.5    98*   CO2 24 24   BUN 20 19   CREATININE 1.0 1.2     Recent Labs     10/28/20  2138 10/30/20  0720   AST 36 27   ALT 45* 32   BILITOT 0.3 0.4   ALKPHOS 109 98     Recent Labs     10/30/20  0720   INR 1.02     No results for input(s): CKTOTAL, CKMB, CKMBINDEX in the last 72 hours. Invalid input(s): Zari Smoker input(s): PRO-BNP      Radiology this visit:  Reviewed. Ct Abdomen Pelvis W Iv Contrast    Result Date: 10/29/2020  EXAMINATION: CT OF THE ABDOMEN AND PELVIS WITH CONTRAST 10/28/2020 11:14 pm TECHNIQUE: CT of the abdomen and pelvis was performed with the administration of intravenous contrast. Multiplanar reformatted images are provided for review. Dose modulation, iterative reconstruction, and/or weight based adjustment of the mA/kV was utilized to reduce the radiation dose to as low as reasonably achievable. COMPARISON: None. HISTORY: ORDERING SYSTEM PROVIDED HISTORY: abdominal pain TECHNOLOGIST PROVIDED HISTORY: IV contrast only. Thank you. Reason for exam:->abdominal pain Reason for exam:->IV contrast only. Thank you. Reason for Exam: hemauria Acuity: Acute Type of Exam: Initial Additional signs and symptoms: Pt presents to the ED with complaints of hematuria for about 3 days. Pt states that he is not in pain currently, but does have pain when he urinates. Pt reports about a month ago, he was having trouble urinating, he went to the Dr and they drained his urine with a catheter and has not had problems since. Pt speaks [place].  Relevant Medical/Surgical History: isovue 370  80 ml FINDINGS: Thorax base:  Borderline possible cystitis. 4. Severe prostatomegaly and evidence of bladder outlet obstruction. 5. 3.2 cm infrarenal abdominal aortic aneurysm. Recommended follow-up every 3 years. RECOMMENDATIONS: For management of fusiform AAA: 3.0-3.4 cm AAA, recommend follow-up every 3 years. * For management of saccular abdominal aortic aneurysms of any size, recommend vascular consultation. Note:  For AAA enlargement of >0.5 cm in 6 months or >1 cm in 1 year, recommend vascular consultation. References: Cynthia  Radiol 2013; 12(43):810-503; J Vasc Surg. 2018; 67:2-77    Documents: Recent previous records, labs, imaging in the EMR were reviewed. Assessments and Plans:  Present on Admission:   Clot retention of urine    1. Acute urinary retention, hematuria  2. Cystolithiasis  3. Probable UTI  4. Probable bladder malignancy  -Urine analysis, culture pending  -Previous urinalysis  10/28 with hematuria and WBCs  -CT scan abdomen pelvis 10/29, reports bladder calculi, possible clots versus bladder malignancy  -Monitor H&H, stable at this time, daily checks    Plan  -Admit to [facility]  -Urology consult  -IV hydration, pain control  -Continue IV ceftriaxone  -Keep n.p.o. for possible cystoscopy  -Start on tamsulosin    5. Incidental AAA,3.2 centimeters  -Seen on CT scan on 10/29  -Will be referred to vascular surgery as outpatient    DVT Prophylaxis: On hold due to hematuria  Code Status full    The above assessments and plans were explained to the patient and family in lay language, who indicated understanding.       MD Cara Carlislee Hospitalist at [facility]  Office Phone: [phone]

## 2020-10-30 NOTE — OP NOTE
[address] [id number]     Operative Note  [facility]    Patient: Betty Pineda    Date: 10/30/2020  : 1955     DOA: 10/30/2020   MRN: [id number]    [facility] [id number]  ROOM#: OR/NONE       Pre Op Diagnosis: gross hematuria, bladder calcification, possible bladder tumor    Post Op Diagnosis: bladder stone,       Procedures:   1. Cystourethroscopy  2. cystolitholapaxy     Complications: None. EBL: minimal    Findings: 4 cm bladder stone     Anesthesia:  General LMA anesthesia    Description:   Betty Pineda is a 71y.o. year old male with a chief complaint of gross hematuria x 2 weeks and LUTS for about 2 years. He speaks only [place] and Status was used to communicate pre op. He was seen at [facility] ED 48 hours ago with hematuria, discharged with abx. Hematuria persisted so he returned to the ED and was admitted to . The patient was evaluated in the preoperative holding area where clinical exam, chart review, and pre-operative anesthetic exam revealed the patient stable for the proposed procedure. Complications were discussed preoperatively with the patient included bleeding, infection, pain, myocardial infarction, stroke, death, no guarantee of success of the procedure, possible perforation or obstruction of the bladder, ureter, or at injury to adjacent viscera and the need for additional surgery as well as possible reactions to medications provided during the course of the procedure. All questions were answered to the patient's satisfaction pre-operatively. Consent was obtained and signed/witnessed. Melony Colindres was then taken to the operative suite placed on the operative table and received anesthesia. Time out was then performed to ensure this is the proper operation for the proper patient. Betty Pineda was then placed in the dorsal lithotomy position and sterilely prepped and draped in the usual fashion.      Cystoscopy:    A 21 Armenian sheathed cystoscope

## 2020-10-30 NOTE — ED NOTES
Patient states increased pain in bladder. The bladder irrigation was stopped. A total of 700 cc is in the byrne bag currently.      Jessica Wilburn RN  10/30/20 1016

## 2020-10-30 NOTE — ED PROVIDER NOTES
eMERGENCY dEPARTMENT eNCOUnter      PCP: No primary care provider on file. CHIEF COMPLAINT    Chief Complaint   Patient presents with    Hematuria     seen recently for same, states its gotten worse, unable to f/u with urology        HPI    Sanjuana Gann is a 71 y.o. male who presents with hematuria. He was seen 2 days ago for the same thing. He was diagnosed with cystitis and found to have possible bladder mass on CT as well as 3 cm nonruptured AAA which needed 3-year follow-up. He was provided with a prescription for Keflex at that time. He was given instructions to follow-up with urology. He returns today with continued hematuria urinary retention. States that he was having blood in the urine, now was passing clots. States he is not able to urinate any urine, it is all blood and clots. Reports that last time he urinated a normal amount was last evening around 5 or 6 PM.  States that at that time it was mainly bloody. He reports bladder pressure, some back discomfort and feels generally weak. He denies any anticoagulant medications. States that he did fill the antibiotic which was prescribed, did not fill until last night took 1 dose last night and one this morning. He has not scheduled follow-up with urology to this point. History taken with  phone service [employer]. REVIEW OF SYSTEMS    Constitutional:  Denies fever, + chills  HENT:  Denies sore throat, congestion  Respiratory:  Denies cough or shortness of breath   Cardiovascular:  Denies chest pain, palpitations  GI:  See HPI.  + abdominal pain, denies nausea, vomiting, or diarrhea   :  See HPI. Musculoskeletal:  Denies pain or edema  Skin:  Denies rash, color change  Neurologic:  Denies headache, focal weakness or sensory changes     See HPI and nursing notes additional information  All other review of systems negative at this time      PAST MEDICAL HISTORY/SURGICAL HISTORY    History reviewed.  No pertinent past medical history. History reviewed. No pertinent surgical history. CURRENT MEDICATIONS    Current Outpatient Rx   Medication Sig Dispense Refill    cephALEXin (KEFLEX) 500 MG capsule Take 1 capsule by mouth 4 times daily for 7 days 28 capsule 0       ALLERGIES    No Known Allergies    FAMILY HISTORY/SOCIAL HISTORY  History reviewed. No pertinent family history.   Social History     Socioeconomic History    Marital status: Unknown     Spouse name: None    Number of children: None    Years of education: None    Highest education level: None   Occupational History    None   Social Needs    Financial resource strain: None    Food insecurity     Worry: None     Inability: None    Transportation needs     Medical: None     Non-medical: None   Tobacco Use    Smoking status: Never Smoker    Smokeless tobacco: Never Used   Substance and Sexual Activity    Alcohol use: Not Currently    Drug use: Never    Sexual activity: None   Lifestyle    Physical activity     Days per week: None     Minutes per session: None    Stress: None   Relationships    Social connections     Talks on phone: None     Gets together: None     Attends Voodoo service: None     Active member of club or organization: None     Attends meetings of clubs or organizations: None     Relationship status: None    Intimate partner violence     Fear of current or ex partner: None     Emotionally abused: None     Physically abused: None     Forced sexual activity: None   Other Topics Concern    None   Social History Narrative    None       PHYSICAL EXAM    VITAL SIGNS: /84   Pulse 103   Temp 98.1 °F (36.7 °C) (Oral)   Resp 18   Wt 160 lb (72.6 kg)   SpO2 99%    Constitutional:  Well-developed, well-nourished, appears comfortable  Eyes:  PERRL, EOM intact  HENT:  Atraumatic, external ears normal, nose normal, oropharynx moist.   NECK: Supple, normal ROM  Respiratory:  No respiratory distress, normal breath sounds   Cardiovascular:  Heart rate regular, normal rhythm, no murmurs  GI:  Abdomen soft, non-distended, no abdominal tenderness  :  no CVA tenderness  Integument:  Well hydrated, no rashes      LABS:  Labs Reviewed   CBC WITH AUTO DIFFERENTIAL - Abnormal; Notable for the following components:       Result Value    RBC 4.50 (*)     Hemoglobin 13.1 (*)     Hematocrit 40.5 (*)     Segs Relative 81.8 (*)     Lymphocytes % 13.7 (*)     All other components within normal limits   COMPREHENSIVE METABOLIC PANEL - Abnormal; Notable for the following components:    Chloride 98 (*)     Glucose 134 (*)     All other components within normal limits   LACTIC ACID, PLASMA - Abnormal; Notable for the following components:    Lactate 2.3 (*)     All other components within normal limits   PROTIME-INR   URINALYSIS   LACTIC ACID, PLASMA           ED COURSE & MEDICAL DECISION MAKING       Vital signs and nursing notes reviewed during ED course. I have independently evaluated this patient. All pertinent Lab data and radiographic results reviewed with patient at bedside. The patient and/or the family were informed of the results of any tests/labs/imaging, the treatment plan, and time was allotted to answer questions. This is a 19-year-old male who presented with hematuria, urinary retention. Has evidence of bladder mass on CT scan done 2 days ago. This is likely the source of bleeding. He was able to urinate only large clots initially when he came in, no urine produced. Because of this Valenzuela catheter was placed and irrigation was started. He was tachycardic on arrival, with Valenzuela catheter placement, irrigation of the bladder, evacuation of some of the clot he did have improvement of his heart rate. Initial lactate was elevated at 2.3, IV fluids given for this as well as of Rocephin. Hemoglobin from 13.9-13.1 today. Does seem to be clearing slightly from the irrigation catheter. I spoke with Dr. Heber Hernadez regarding patient's care.   He does feel patient needs clot evacuation with cystoscopy. Patient is made n.p.o. We will plan for admission to the hospital for further evaluation and care. Differential diagnosis: Pyelonephritis, Kidney Stone, UTI, Urosepsis, Appendicitis, bladder mass    The likelihood of other entities in the differential is insufficient to justify any further testing for them. This was explained to the patient. The patient was advised that persistent or worsening symptoms would require further evaluation. Clinical  IMPRESSION    Gross hematuria with urinary obstruction      Diagnosis and plan discussed in detail with patient who understands and agrees. Patient agrees to return emergency department if symptoms worsen or any new symptoms develop.       (Please note the MDM and HPI sections of this note were completed with a voice recognition program.  Efforts were made to edit the dictations but occasionally words are mis-transcribed.)      Ceasar Richards DO  10/30/20 [id number]

## 2020-10-30 NOTE — ED NOTES
Upon evaluation of the client, they are observed to be alert and oriented to person, place and situation. The head of the bed is elevated above 30 degrees. The client verbalizes appropriately to all questions and/or comments. The client also makes eye contact when prompted. The client also exhibits unlabored breathing, their skin is pink, warm & dry, and are observed to have relaxed extremities. When communicating, the client speaks with clear speech and normal tone and is in no apparent distress. The call light is within reach, the bed is in the low position and both side rails are up.      Azalia Johnson RN  10/30/20 3453

## 2020-10-30 NOTE — ED NOTES
This RN and Dr. Tejinder Barbosa at bedside with interpretor services on the phone.      Sidra Chen RN  10/30/20 6314

## 2020-10-30 NOTE — ANESTHESIA PRE PROCEDURE
Department of Anesthesiology  Preprocedure Note       Name:  Ghassan School   Age:  71 y.o.  :  1955                                          MRN:  [id number]         Date:  10/30/2020      Surgeon: Letitia Morales):  Norberto Ramon MD    Procedure: Procedure(s):  CYSTOSCOPY EVACUATION OF CLOTS, TURBT WITH LASER    Medications prior to admission:   Prior to Admission medications    Medication Sig Start Date End Date Taking? Authorizing Provider   cephALEXin (KEFLEX) 500 MG capsule Take 1 capsule by mouth 4 times daily for 7 days 10/29/20 11/5/20  Noemí Foley PA-C       Current medications:    Current Facility-Administered Medications   Medication Dose Route Frequency Provider Last Rate Last Dose    lidocaine (XYLOCAINE) 2 % uro-jet   Topical PRN Callie Mcelroy, DO   7 mL at 10/30/20 0807     Current Outpatient Medications   Medication Sig Dispense Refill    cephALEXin (KEFLEX) 500 MG capsule Take 1 capsule by mouth 4 times daily for 7 days 28 capsule 0       Allergies:  No Known Allergies    Problem List:  There is no problem list on file for this patient. Past Medical History:  History reviewed. No pertinent past medical history. Past Surgical History:  History reviewed. No pertinent surgical history.     Social History:    Social History     Tobacco Use    Smoking status: Never Smoker    Smokeless tobacco: Never Used   Substance Use Topics    Alcohol use: Not Currently                                Counseling given: Not Answered      Vital Signs (Current):   Vitals:    10/30/20 0636 10/30/20 0750   BP: 134/84 104/83   Pulse: 103 99   Resp: 18 20   Temp: 36.7 °C (98.1 °F)    TempSrc: Oral    SpO2: 99% 98%   Weight: 160 lb (72.6 kg)                                               BP Readings from Last 3 Encounters:   10/30/20 104/83   10/29/20 (!) 174/110       NPO Status:                                                                                 BMI:   Wt Readings from Last 3 Encounters: 10/30/20 160 lb (72.6 kg)   10/28/20 157 lb (71.2 kg)     There is no height or weight on file to calculate BMI.    CBC:   Lab Results   Component Value Date    WBC 6.4 10/30/2020    RBC 4.50 10/30/2020    HGB 13.1 10/30/2020    HCT 40.5 10/30/2020    MCV 90.0 10/30/2020    RDW 13.7 10/30/2020     10/30/2020       CMP:   Lab Results   Component Value Date     10/30/2020    K 3.5 10/30/2020    CL 98 10/30/2020    CO2 24 10/30/2020    BUN 19 10/30/2020    CREATININE 1.2 10/30/2020    GFRAA >60 10/30/2020    LABGLOM >60 10/30/2020    GLUCOSE 134 10/30/2020    PROT 7.3 10/30/2020    CALCIUM 9.4 10/30/2020    BILITOT 0.4 10/30/2020    ALKPHOS 98 10/30/2020    AST 27 10/30/2020    ALT 32 10/30/2020       POC Tests: No results for input(s): POCGLU, POCNA, POCK, POCCL, POCBUN, POCHEMO, POCHCT in the last 72 hours. Coags:   Lab Results   Component Value Date    PROTIME 12.4 10/30/2020    INR 1.02 10/30/2020       HCG (If Applicable): No results found for: PREGTESTUR, PREGSERUM, HCG, HCGQUANT     ABGs: No results found for: PHART, PO2ART, UOD1DYF, ZVI5GMG, BEART, H2EAIFXK     Type & Screen (If Applicable):  No results found for: LABABO, LABRH    Drug/Infectious Status (If Applicable):  No results found for: HIV, HEPCAB    COVID-19 Screening (If Applicable): No results found for: COVID19      Anesthesia Evaluation  Patient summary reviewed  Airway: Mallampati: I  TM distance: >3 FB   Neck ROM: full  Mouth opening: > = 3 FB Dental:          Pulmonary:Negative Pulmonary ROS and normal exam                               Cardiovascular:Negative CV ROS            Rhythm: regular  Rate: normal           Beta Blocker:  Not on Beta Blocker         Neuro/Psych:   Negative Neuro/Psych ROS              GI/Hepatic/Renal:            ROS comment: hematuria . Endo/Other: Negative Endo/Other ROS                    Abdominal:           Vascular:           ROS comment:  3.2 cm infrarenal abdominal aortic aneurysm. [place] [facility] Anesthesia Plan      general     ASA 2 - emergent       Induction: intravenous. Anesthetic plan and risks discussed with patient. Plan discussed with CRNA. Ora Fabry, APRN - CRNA   10/30/2020       Pre Anesthesia Assessment complete.  Chart reviewed on 10/30/2020

## 2020-10-30 NOTE — ED NOTES
The patient was helped to the bed side commode for a bowel movement. The patient became tachycardic at 140 bpm. The patients heart rate went down to 100 bpm after sitting for a moment. The doctor was notified.      Severiano Avers, RN  10/30/20 1017

## 2020-10-30 NOTE — CONSULTS
Department of Urology   Consult Note  [facility] 1 2 3 4 5      Date: 10/30/2020   Patient: Shayna Rodarte   : 1955   DOA: 10/30/2020   MRN: [id number]   ROOM#: OR/NONE     Reason for Consult:  Hematuria, bladder calcification  Requesting Practitioner:  [facility] ED     CHIEF COMPLAINT:  hematuria    History Obtained From:  patient, electronic medical record    HISTORY OF PRESENT ILLNESS:                The patient is a 71 y.o. male with significant past medical history of ? who presented with gross hematuria to [facility] ED 48 hours ago and this AM as well. Due to persistent hemorrhage he was admitted to  for further evalatuion and to allow for urologic evaluation. He was made NPO. He has a has LUTS for a few years but gross hematuria     He speaks [place]. Uses [employer]. He takes medications but he isn't sure what they are or what they are for. [facility] ED HPI: \"Honorio Mason is a 71 y.o. male who presents with hematuria. He was seen 2 days ago for the same thing. He was diagnosed with cystitis and found to have possible bladder mass on CT as well as 3 cm nonruptured AAA which needed 3-year follow-up. He was provided with a prescription for Keflex at that time. He was given instructions to follow-up with urology. He returns today with continued hematuria urinary retention. States that he was having blood in the urine, now was passing clots. States he is not able to urinate any urine, it is all blood and clots. Reports that last time he urinated a normal amount was last evening around 5 or 6 PM.  States that at that time it was mainly bloody. He reports bladder pressure, some back discomfort and feels generally weak. He denies any anticoagulant medications. States that he did fill the antibiotic which was prescribed, did not fill until last night took 1 dose last night and one this morning.   He has not scheduled follow-up with urology to this point.     History taken with  phone service [employer]. \"    Past Medical History:    History reviewed. No pertinent past medical history. Past Surgical History:    History reviewed. No pertinent surgical history. Current Medications:   Current Facility-Administered Medications: lidocaine (XYLOCAINE) 2 % uro-jet, , Topical, PRN  tamsulosin (FLOMAX) capsule 0.4 mg, 0.4 mg, Oral, Daily    Allergies:  Patient has no known allergies. Social History:   TOBACCO:   reports that he has never smoked. He has never used smokeless tobacco.  ETOH:   reports previous alcohol use. DRUGS:   reports no history of drug use. MARITAL STATUS:     OCCUPATION:      Family History:     History reviewed. No pertinent family history. REVIEW OF SYSTEMS:    GENITOURINARY:  positive for hematuria    PHYSICAL EXAM:    VITALS:  /77   Pulse 76   Temp 98.4 °F (36.9 °C) (Oral)   Resp 18   Wt 160 lb (72.6 kg)   SpO2 98%     TEMPERATURE:  Current - Temp: 98.4 °F (36.9 °C);  Max - Temp  Av.3 °F (36.8 °C)  Min: 98.1 °F (36.7 °C)  Max: 98.4 °F (36.9 °C)  24HR BLOOD PRESSURE RANGE:  Systolic (93WJM), FDT:899 , Min:104 , DXT:144   ; Diastolic (04MYI), ICE:27, Min:77, Max:97    8HR INTAKE OUTPUT:  In: -   Out: 300 [Urine:300]  URINARY CATHETER OUTPUT (Valenzuela):     DRAIN/TUBE OUTPUT:        CONSTITUTIONAL:  awake, alert, cooperative, no apparent distress, and appears stated age  EYES:  Lids and lashes normal, pupils equal, round  NECK:  supple, symmetrical, trachea midline and skin normal  BACK:  Symmetric, no curvature, spinous processes are non-tender on palpation, paraspinous muscles are non-tender on palpation, no costal vertebral tenderness  LUNGS:  No increased work of breathing  ABDOMEN:  No scars, normal bowel sounds, soft, non-distended, non-tender, no masses palpated, no hepatosplenomegally    Data:    WBC:    Lab Results   Component Value Date    WBC 6.4 10/30/2020     Hemoglobin/Hematocrit:    Lab Results   Component Value Date    HGB 13.1 10/30/2020    HCT 40.5 10/30/2020     BMP:    Lab Results   Component Value Date     10/30/2020    K 3.5 10/30/2020    CL 98 10/30/2020    CO2 24 10/30/2020    BUN 19 10/30/2020    LABALBU 3.9 10/30/2020    CREATININE 1.2 10/30/2020    CALCIUM 9.4 10/30/2020    GFRAA >60 10/30/2020    LABGLOM >60 10/30/2020     PT/INR:    Lab Results   Component Value Date    PROTIME 12.4 10/30/2020    INR 1.02 10/30/2020     Results for Hodan Tovar (MRN [id number]) as of 10/30/2020 15:02   Ref. Range 10/28/2020 19:40   Color, UA Latest Ref Range: YELLOW  RED (A)   Clarity, UA Latest Ref Range: CLEAR  SLIGHTLY CLOUDY (A)   Bilirubin, Urine Latest Ref Range: NEGATIVE MG/DL NEGATIVE   Ketones, Urine Latest Ref Range: NEGATIVE MG/DL NEGATIVE   Specific Gravity, UA Latest Ref Range: 1.001 - 1.035  1.015   Blood, Urine Latest Ref Range: NEGATIVE  MODERATE (A)   Protein, UA Latest Ref Range: NEGATIVE MG/ (A)   Urobilinogen, Urine Latest Ref Range: 0.2 - 1.0 MG/DL NORMAL   Leukocyte Esterase, Urine Latest Ref Range: NEGATIVE  NEGATIVE   Glucose, Urine Latest Ref Range: NEGATIVE MG/DL 50 (A)   Nitrite Urine, Quantitative Latest Ref Range: NEGATIVE  NEGATIVE   pH, Urine Latest Ref Range: 5.0 - 8.0  6.0   WBC, UA Latest Ref Range: 0 - 2 / (H)   RBC, UA Latest Ref Range: 0 - 3 /HPF 57,225 (H)   Bacteria, UA Latest Ref Range: NEGATIVE /HPF NEGATIVE   Trichomonas, UA Latest Ref Range: NONE SEEN /HPF NONE SEEN         Imaging:    Ct Abdomen Pelvis W Iv Contrast    Result Date: 10/29/2020  EXAMINATION: CT OF THE ABDOMEN AND PELVIS WITH CONTRAST 10/28/2020 11:14 pm TECHNIQUE: CT of the abdomen and pelvis was performed with the administration of intravenous contrast. Multiplanar reformatted images are provided for review. Dose modulation, iterative reconstruction, and/or weight based adjustment of the mA/kV was utilized to reduce the radiation dose to as low as reasonably achievable. COMPARISON: None.  HISTORY: ORDERING SYSTEM PROVIDED HISTORY: abdominal pain TECHNOLOGIST PROVIDED HISTORY: IV contrast only. Thank you. Reason for exam:->abdominal pain Reason for exam:->IV contrast only. Thank you. Reason for Exam: hemauria Acuity: Acute Type of Exam: Initial Additional signs and symptoms: Pt presents to the ED with complaints of hematuria for about 3 days. Pt states that he is not in pain currently, but does have pain when he urinates. Pt reports about a month ago, he was having trouble urinating, he went to the Dr and they drained his urine with a catheter and has not had problems since. Pt speaks [place]. Relevant Medical/Surgical History: isovue 370  80 ml FINDINGS: Thorax base:  Borderline enlarged heart size. No pericardial effusion. The lung bases demonstrate mild patchy ground-glass densities of the right lower lobe with some mild mosaic attenuation. No pleural effusion. Abdomen: The liver, gallbladder, common bile duct, pancreas, adrenals, spleen, and portal vasculature are normal.  The inferior vena cava is normal. Abdominal aortic atherosclerosis with infrarenal fusiform 3.2 cm diameter abdominal aortic aneurysm. The kidneys are symmetric with mild right hydronephrosis and ureteral dilatation with no obstructing mass or calculus. The left kidney and ureter are normal. The stomach, small bowel, and appendix are normal.  The colon is normal. No ascites or free air. No significant enlarged lymphadenopathy. Pelvis:  Severe prostatomegaly which impinges upon the posteroinferior bladder. Within the bladder lumen there is a densely calcified 4.2 cm calculus. Within the bladder lumen at the superior margin of the calculus there is a ovoid hyperdense soft tissue mass that measures approximately 4.2 x 1.3 cm. There is a mild amount of intraluminal gas present. The rectum is normal.  No significant enlarged iliac chain lymph nodes. Musculoskeletal structures: The body wall soft tissues demonstrate no acute abnormality.   No significant enlarged inguinal lymph nodes. Normal bone mineral density. Normal lumbar spine alignment with lumbosacral junction degenerative disc and joint disease. Normal pelvic alignment with symmetric hip arthropathy. No acute osseous abnormality. 1. No acute intra-abdominal abnormality. 2. 4.2 cm diameter bladder calculus present. At the superior aspect of the calculus there is an intraluminal bladder 4.2 cm soft tissue mass. This could represent a possible blood clot versus possible primary malignancy. Recommend urologic consultation and cystoscopy. 3. There is a small amount of free air within the bladder which could relate to recent instrumentation versus possible cystitis. 4. Severe prostatomegaly and evidence of bladder outlet obstruction. 5. 3.2 cm infrarenal abdominal aortic aneurysm. Recommended follow-up every 3 years. RECOMMENDATIONS: For management of fusiform AAA: 3.0-3.4 cm AAA, recommend follow-up every 3 years. * For management of saccular abdominal aortic aneurysms of any size, recommend vascular consultation. Note:  For AAA enlargement of >0.5 cm in 6 months or >1 cm in 1 year, recommend vascular consultation. References: Shaji Blew Radiol 2013; 44(68):163-373; J Vasc Surg. 2018; 67:2-77           Impression: 70 yo male with hematuria and bladder calcification      Recommendation: NPO for cystoscopy, possible TURBT, cystolitholapaxy. Risks include but aren't limited to pain, infection, bleeding, need for further procedure, MI/death/CVA. Consent signed/witnessed/placed in the chart. All questions answered. Will proceed. Patient seen and examined, chart reviewed.      Jake Kapoor MD     Electronically signed at 10/30/2020

## 2020-10-30 NOTE — ED NOTES
Patient presents to ED with hematuria and difficulty urinating. Patient states he is currently taking the medication prescribed from visit on 10/28/20. Patient currently unable to void and states last urination was approx. 3712-9649 on 10/29/20. Currently having back and flank pain, 8/10.  Patient states he has not scheduled a follow up appointment with urologist.     Neelam Samayoa RN  10/30/20 0700

## 2020-10-30 NOTE — ED NOTES
Valenzuela catheter inserted with blood returned and bladder irrigation started.      Soila Silva RN  10/30/20 2341

## 2020-10-30 NOTE — PROGRESS NOTES
Transport here to take patient to \[facility]\"". Translation ipad sent with patient. Unable to reach [employer] via telephone prior to patient leaving the unit.

## 2020-10-31 LAB
ANION GAP SERPL CALCULATED.3IONS-SCNC: 10 MMOL/L (ref 4–16)
BACTERIA: ABNORMAL /HPF
BASOPHILS ABSOLUTE: 0 K/CU MM
BASOPHILS RELATIVE PERCENT: 0.1 % (ref 0–1)
BILIRUBIN URINE: NEGATIVE MG/DL
BLOOD, URINE: ABNORMAL
BUN BLDV-MCNC: 15 MG/DL (ref 6–23)
CALCIUM SERPL-MCNC: 8.9 MG/DL (ref 8.3–10.6)
CHLORIDE BLD-SCNC: 100 MMOL/L (ref 99–110)
CLARITY: ABNORMAL
CO2: 26 MMOL/L (ref 21–32)
COLOR: ABNORMAL
CREAT SERPL-MCNC: 1.2 MG/DL (ref 0.9–1.3)
CULTURE: ABNORMAL
CULTURE: ABNORMAL
DIFFERENTIAL TYPE: ABNORMAL
EOSINOPHILS ABSOLUTE: 0 K/CU MM
EOSINOPHILS RELATIVE PERCENT: 0 % (ref 0–3)
GFR AFRICAN AMERICAN: >60 ML/MIN/1.73M2
GFR NON-AFRICAN AMERICAN: >60 ML/MIN/1.73M2
GLUCOSE BLD-MCNC: 129 MG/DL (ref 70–99)
GLUCOSE, URINE: NEGATIVE MG/DL
HCT VFR BLD CALC: 36.8 % (ref 42–52)
HEMOGLOBIN: 11.8 GM/DL (ref 13.5–18)
IMMATURE NEUTROPHIL %: 0.4 % (ref 0–0.43)
KETONES, URINE: NEGATIVE MG/DL
LEUKOCYTE ESTERASE, URINE: ABNORMAL
LYMPHOCYTES ABSOLUTE: 0.9 K/CU MM
LYMPHOCYTES RELATIVE PERCENT: 7.6 % (ref 24–44)
Lab: ABNORMAL
MCH RBC QN AUTO: 29.1 PG (ref 27–31)
MCHC RBC AUTO-ENTMCNC: 32.1 % (ref 32–36)
MCV RBC AUTO: 90.9 FL (ref 78–100)
MONOCYTES ABSOLUTE: 0.4 K/CU MM
MONOCYTES RELATIVE PERCENT: 3.5 % (ref 0–4)
MUCUS: ABNORMAL HPF
NITRITE URINE, QUANTITATIVE: NEGATIVE
NUCLEATED RBC %: 0 %
PDW BLD-RTO: 14 % (ref 11.7–14.9)
PH, URINE: 6 (ref 5–8)
PLATELET # BLD: 301 K/CU MM (ref 140–440)
PMV BLD AUTO: 9.5 FL (ref 7.5–11.1)
POTASSIUM SERPL-SCNC: 4.2 MMOL/L (ref 3.5–5.1)
PROSTATE SPECIFIC ANTIGEN: 8.03 NG/ML (ref 0–4)
PROTEIN UA: 100 MG/DL
RBC # BLD: 4.05 M/CU MM (ref 4.6–6.2)
RBC URINE: 826 /HPF (ref 0–3)
SEGMENTED NEUTROPHILS ABSOLUTE COUNT: 10.4 K/CU MM
SEGMENTED NEUTROPHILS RELATIVE PERCENT: 88.4 % (ref 36–66)
SODIUM BLD-SCNC: 136 MMOL/L (ref 135–145)
SPECIFIC GRAVITY UA: 1.01 (ref 1–1.03)
SPECIMEN: ABNORMAL
SQUAMOUS EPITHELIAL: 39 /HPF
TOTAL IMMATURE NEUTOROPHIL: 0.05 K/CU MM
TOTAL NUCLEATED RBC: 0 K/CU MM
TRICHOMONAS: ABNORMAL /HPF
UROBILINOGEN, URINE: NORMAL MG/DL (ref 0.2–1)
WBC # BLD: 11.8 K/CU MM (ref 4–10.5)
WBC UA: 452 /HPF (ref 0–2)

## 2020-10-31 PROCEDURE — 1200000000 HC SEMI PRIVATE

## 2020-10-31 PROCEDURE — 6370000000 HC RX 637 (ALT 250 FOR IP): Performed by: UROLOGY

## 2020-10-31 PROCEDURE — 85025 COMPLETE CBC W/AUTO DIFF WBC: CPT

## 2020-10-31 PROCEDURE — 36415 COLL VENOUS BLD VENIPUNCTURE: CPT

## 2020-10-31 PROCEDURE — 6360000002 HC RX W HCPCS: Performed by: UROLOGY

## 2020-10-31 PROCEDURE — 94761 N-INVAS EAR/PLS OXIMETRY MLT: CPT

## 2020-10-31 PROCEDURE — G0103 PSA SCREENING: HCPCS

## 2020-10-31 PROCEDURE — 80048 BASIC METABOLIC PNL TOTAL CA: CPT

## 2020-10-31 PROCEDURE — 2580000003 HC RX 258: Performed by: UROLOGY

## 2020-10-31 PROCEDURE — 81001 URINALYSIS AUTO W/SCOPE: CPT

## 2020-10-31 RX ADMIN — CEFTRIAXONE SODIUM 1 G: 1 INJECTION, POWDER, FOR SOLUTION INTRAMUSCULAR; INTRAVENOUS at 10:47

## 2020-10-31 RX ADMIN — DEXTROSE AND SODIUM CHLORIDE: 5; 450 INJECTION, SOLUTION INTRAVENOUS at 09:13

## 2020-10-31 RX ADMIN — ACETAMINOPHEN 650 MG: 325 TABLET ORAL at 01:15

## 2020-10-31 RX ADMIN — TAMSULOSIN HYDROCHLORIDE 0.4 MG: 0.4 CAPSULE ORAL at 10:47

## 2020-10-31 ASSESSMENT — PAIN DESCRIPTION - PAIN TYPE
TYPE: ACUTE PAIN
TYPE: ACUTE PAIN

## 2020-10-31 ASSESSMENT — PAIN SCALES - GENERAL
PAINLEVEL_OUTOF10: 0
PAINLEVEL_OUTOF10: 3
PAINLEVEL_OUTOF10: 5
PAINLEVEL_OUTOF10: 0
PAINLEVEL_OUTOF10: 2
PAINLEVEL_OUTOF10: 0
PAINLEVEL_OUTOF10: 6

## 2020-10-31 ASSESSMENT — PAIN DESCRIPTION - ONSET: ONSET: ON-GOING

## 2020-10-31 ASSESSMENT — PAIN DESCRIPTION - FREQUENCY: FREQUENCY: CONTINUOUS

## 2020-10-31 ASSESSMENT — PAIN - FUNCTIONAL ASSESSMENT: PAIN_FUNCTIONAL_ASSESSMENT: ACTIVITIES ARE NOT PREVENTED

## 2020-10-31 ASSESSMENT — PAIN DESCRIPTION - PROGRESSION: CLINICAL_PROGRESSION: GRADUALLY IMPROVING

## 2020-10-31 ASSESSMENT — PAIN DESCRIPTION - ORIENTATION: ORIENTATION: LOWER

## 2020-10-31 ASSESSMENT — PAIN DESCRIPTION - DESCRIPTORS: DESCRIPTORS: ACHING

## 2020-10-31 NOTE — PROGRESS NOTES
[address] [place] [address]  HOSPITALIST PROGRESS NOTE                       Name:  Epifanio Newton /Age/Sex: 1955  (65 y.o. male)   MRN & CSN:  [id number] & [id number] Admission Date/Time: 10/30/2020  6:26 AM   [facility]  [facility] Attending:  Alysia Lozano MD                                                  HPI  Epifanio Newton is a 71 y.o. male who presents with hematuria    SUBJECTIVE  Awake, reports pain at the urinary catheter insertion site. Valenzuela somewhat appears bloody. Communicated via     10 point review of systems reviewed and negative unless noted above. ALLERGIES: No Known Allergies    PCP: No primary care provider on file. PAST MEDICAL HISTORY, SURGICAL HISTORY, SOCIAL HISTORY and  HOME MEDICATIONS all reviewed. OBJECTIVE  Vitals:    10/31/20 0430 10/31/20 0645 10/31/20 0734 10/31/20 1030   BP: 131/75 126/70  132/80   Pulse: 64 61  61   Resp: 17 17  16   Temp: 98.8 °F (37.1 °C) 98.5 °F (36.9 °C)  98.3 °F (36.8 °C)   TempSrc: Oral Oral  Oral   SpO2: 100% 98% 96% 100%   Weight:       Height:           PHYSICAL EXAM   GEN Awake male, sitting upright in bed in no apparent distress. EYES Pupils are equally round. No scleral erythema, discharge, or conjunctivitis. HENT Mucous membranes are moist. Oral pharynx without exudates, no evidence of thrush. NECK Supple, no apparent thyromegaly or masses. RESP Clear to auscultation, no wheezes, rales or rhonchi. Symmetric chest movement while on room air. CARDIO/VASC S1/S2 auscultated. Regular rate without appreciable murmurs, rubs, or gallops. No JVD or carotid bruits. Peripheral pulses equal bilaterally and palpable. No peripheral edema. GI Abdomen is soft without significant tenderness, masses, or guarding. Bowel sounds are normoactive. Rectal exam deferred.  No costovertebral angle tenderness. Normal appearing external genitalia. Valenzuela catheter is not present.   HEME/LYMPH No palpable cervical lymphadenopathy and no hepatosplenomegaly. No petechiae or ecchymoses. MSK Spontaneous movement of all extremities. No gross joint deformities. SKIN Normal coloration, warm, dry. NEURO Cranial nerves appear grossly intact, normal speech, no lateralizing weakness. PSYCH Awake, alert, oriented x 4. Affect appropriate. INTAKE: In: 1500 [I.V.:1500]  Out: 2150   OUTPUT: In: 1500   Out: 2150 [Urine:2150]    LABS  Recent Labs     10/28/20  2138 10/30/20  0720 10/31/20  0430   WBC 6.3 6.4 11.8*   HGB 13.9 13.1* 11.8*   HCT 43.2 40.5* 36.8*    281 301      Recent Labs     10/28/20  2138 10/30/20  0720 10/31/20  0430    135 136   K 3.6 3.5 4.2    98* 100   CO2 24 24 26   BUN 20 19 15   CREATININE 1.0 1.2 1.2     Recent Labs     10/28/20  2138 10/30/20  0720   AST 36 27   ALT 45* 32   BILITOT 0.3 0.4   ALKPHOS 109 98     Recent Labs     10/30/20  0720   INR 1.02     No results for input(s): CKTOTAL, CKMB, CKMBINDEX, TROPONINT in the last 72 hours. Abnormal labs for today noted      Imaging:     ECHO:    Microbiology:  Blood culture:    Urine culture:    Sputum culture:    Procedures done this admission:    MEDS  Scheduled Meds:   sodium chloride flush  10 mL Intravenous 2 times per day    cefTRIAXone (ROCEPHIN) IV  1 g Intravenous Daily    tamsulosin  0.4 mg Oral Daily     Continuous Infusions:   dextrose 5 % and 0.45 % NaCl 75 mL/hr at 10/31/20 0913     PRN Meds:lidocaine, sodium chloride flush, acetaminophen **OR** acetaminophen, polyethylene glycol, magnesium sulfate, potassium chloride **OR** potassium alternative oral replacement **OR** potassium chloride, ondansetron        ASSESSMENT and PLAN  Hospital Day: 2      1-Acute urinary retention with hematuria- noted CT findings. S/p cystourethrescopy/cystalitholapaxy yesterday, with post-op diagnosis of bladder stone. Still having hematuria via byrne. Await final urology input.   2-Probable acute blood loss anemia- mild drop in H/H due to above, monitor for now  3-Elevated BP on presentation- improved today      Expect to discharge once cleared by urology.             Disp:     Diet DIET GENERAL;   DVT Prophylaxis [] Lovenox, []  Heparin, [] SCDs, [] Ambulation   GI Prophylaxis [] PPI,  [] H2 Blocker,  [] Carafate,  [] Diet/Tube Feeds   Code Status Full Code   Disposition Patient requires continued admission due to hematuria   CMS Level of Risk [] Low, [x] Moderate,[]  High  Patient's risk as above due to hematuria     TAHIR JACOBSEN MD 10/31/2020 10:50 AM

## 2020-10-31 NOTE — PROGRESS NOTES
[facility]   [facility]  Progress Note 0 1 2      Date: 10/31/2020   Patient: Hattie Russo   :    DOA: 10/30/2020   MRN: [id number]   ROOM#: 1102/1102-A     Subjective     I'm doing well     Objective     AF/VSS    CBC:   Lab Results   Component Value Date    WBC 11.8 10/31/2020    RBC 4.05 10/31/2020    HGB 11.8 10/31/2020    HCT 36.8 10/31/2020    MCV 90.9 10/31/2020    MCH 29.1 10/31/2020    MCHC 32.1 10/31/2020    RDW 14.0 10/31/2020     10/31/2020    MPV 9.5 10/31/2020     BMP:    Lab Results   Component Value Date     10/31/2020    K 4.2 10/31/2020     10/31/2020    CO2 26 10/31/2020    BUN 15 10/31/2020    LABALBU 3.9 10/30/2020    CREATININE 1.2 10/31/2020    CALCIUM 8.9 10/31/2020    GFRAA >60 10/31/2020    LABGLOM >60 10/31/2020    GLUCOSE 129 10/31/2020     Results for Michaela Penny (MRN [id number]) as of 10/31/2020 10:58   Ref. Range 10/31/2020 04:30   Prostatic Specific Ag Latest Ref Range: 0 - 4.0 NG/ML 8.03 (H)       Physical Exam:     NAD  NT/ND  Urine clear    Assessment/Plan    Hattie Russo is a 71 y.o. male admitted 10/30/2020 for hematuria    BPH/bladder stone: POD 1 s/p cystolitholapaxy of 4 cm stone. Urine clear, hematuria resolved. Voiding trial today. Assuming successful he can be discharged and he can f/u with his PCP in [facility] to discuss intervention of BPH. Elevated PSA: 8 10/20. Likely due to post and BPH. Follow clinically. Speaks [place] only. Returning to [facility] 20. He can f/u prn further issues, will sign off    Thanks. Patient seen and examined, chart reviewed.      Mar Kidd MD     Electronically signed at 10/31/2020

## 2020-10-31 NOTE — PROGRESS NOTES
1821-Pt arrived from OR and placed on all PACU monitoring devices. Report received from [address] [facility] [facility]. Respirations even and unlabored. Valenzuela draining bloody urine. VSS  1845-pt more awake used ipad  to communicate with patient. ALl questions answered. 1920-BP improved. Urine now light pink. PT transferred to room 1102 with personal cell phone and  ipad on tele. Arabella RN at bedside to assume care.

## 2020-11-01 VITALS
HEIGHT: 68 IN | TEMPERATURE: 98 F | DIASTOLIC BLOOD PRESSURE: 88 MMHG | OXYGEN SATURATION: 97 % | WEIGHT: 155 LBS | SYSTOLIC BLOOD PRESSURE: 153 MMHG | HEART RATE: 67 BPM | BODY MASS INDEX: 23.49 KG/M2 | RESPIRATION RATE: 16 BRPM

## 2020-11-01 PROCEDURE — 6360000002 HC RX W HCPCS: Performed by: UROLOGY

## 2020-11-01 PROCEDURE — 6370000000 HC RX 637 (ALT 250 FOR IP): Performed by: UROLOGY

## 2020-11-01 PROCEDURE — 6370000000 HC RX 637 (ALT 250 FOR IP): Performed by: HOSPITALIST

## 2020-11-01 PROCEDURE — 94761 N-INVAS EAR/PLS OXIMETRY MLT: CPT

## 2020-11-01 PROCEDURE — 2580000003 HC RX 258: Performed by: UROLOGY

## 2020-11-01 RX ORDER — LEVOFLOXACIN 500 MG/1
500 TABLET, FILM COATED ORAL DAILY
Qty: 6 TABLET | Refills: 0 | Status: SHIPPED | OUTPATIENT
Start: 2020-11-01 | End: 2020-11-07

## 2020-11-01 RX ORDER — TAMSULOSIN HYDROCHLORIDE 0.4 MG/1
0.4 CAPSULE ORAL DAILY
Qty: 30 CAPSULE | Refills: 1 | Status: ON HOLD | OUTPATIENT
Start: 2020-11-02 | End: 2022-06-23 | Stop reason: HOSPADM

## 2020-11-01 RX ORDER — LEVOFLOXACIN 500 MG/1
500 TABLET, FILM COATED ORAL DAILY
Status: DISCONTINUED | OUTPATIENT
Start: 2020-11-01 | End: 2020-11-01 | Stop reason: HOSPADM

## 2020-11-01 RX ADMIN — ACETAMINOPHEN 650 MG: 325 TABLET ORAL at 09:07

## 2020-11-01 RX ADMIN — CEFTRIAXONE SODIUM 1 G: 1 INJECTION, POWDER, FOR SOLUTION INTRAMUSCULAR; INTRAVENOUS at 09:07

## 2020-11-01 RX ADMIN — METOPROLOL TARTRATE 25 MG: 25 TABLET, FILM COATED ORAL at 10:11

## 2020-11-01 RX ADMIN — TAMSULOSIN HYDROCHLORIDE 0.4 MG: 0.4 CAPSULE ORAL at 09:07

## 2020-11-01 RX ADMIN — SODIUM CHLORIDE, PRESERVATIVE FREE 10 ML: 5 INJECTION INTRAVENOUS at 09:07

## 2020-11-01 RX ADMIN — LEVOFLOXACIN 500 MG: 500 TABLET, FILM COATED ORAL at 10:11

## 2020-11-01 ASSESSMENT — PAIN DESCRIPTION - LOCATION: LOCATION: PENIS

## 2020-11-01 ASSESSMENT — PAIN SCALES - GENERAL: PAINLEVEL_OUTOF10: 5

## 2020-11-01 ASSESSMENT — PAIN DESCRIPTION - PAIN TYPE: TYPE: ACUTE PAIN

## 2020-11-01 ASSESSMENT — PAIN DESCRIPTION - ORIENTATION: ORIENTATION: LOWER

## 2020-11-04 NOTE — ANESTHESIA POSTPROCEDURE EVALUATION
Department of Anesthesiology  Postprocedure Note    Patient: Ronny Narayan  MRN: [id number]  YOB: 1955  Date of evaluation: 11/4/2020  Time:  8:57 AM     Procedure Summary     Date:  10/30/20 Room / Location:  [address]    Anesthesia Start:  [facility] Anesthesia Stop:  [facility]wn    Procedure:  CYSTOSCOPY LITHOLAPAXY (N/A Bladder) Diagnosis:  (hematuria)    Surgeon:  Cliff Reddy MD Responsible Provider:  RACHEL Chiang CRNA    Anesthesia Type:  general ASA Status:  2 - Emergent          Anesthesia Type: general    Dieudonne Phase I: Dieudonne Score: 9    Dieudonne Phase II:      Last vitals: Reviewed and per EMR flowsheets.        Anesthesia Post Evaluation    Patient location during evaluation: PACU  Patient participation: complete - patient participated  Level of consciousness: awake and alert  Pain score: 1  Airway patency: patent  Nausea & Vomiting: no nausea and no vomiting  Complications: no  Cardiovascular status: blood pressure returned to baseline  Respiratory status: acceptable  Hydration status: euvolemic

## 2020-11-05 LAB
STONE COMPOSITION: NORMAL
STONE DESCRIPTION: NORMAL
STONE MASS: NORMAL MG
STONE NUMBER: NORMAL
STONE SIZE: NORMAL MM

## 2022-06-20 ENCOUNTER — HOSPITAL ENCOUNTER (INPATIENT)
Age: 67
LOS: 3 days | Discharge: HOME | DRG: 244 | End: 2022-06-23
Attending: INTERNAL MEDICINE | Admitting: INTERNAL MEDICINE
Payer: MEDICAID

## 2022-06-20 DIAGNOSIS — K62.5 RECTAL BLEEDING: Primary | ICD-10-CM

## 2022-06-20 PROBLEM — K92.2 GIB (GASTROINTESTINAL BLEEDING): Status: ACTIVE | Noted: 2022-06-20

## 2022-06-20 LAB
ALBUMIN SERPL-MCNC: 4.1 GM/DL (ref 3.4–5)
ALP BLD-CCNC: 97 IU/L (ref 40–129)
ALT SERPL-CCNC: 13 U/L (ref 10–40)
ANION GAP SERPL CALCULATED.3IONS-SCNC: 11 MMOL/L (ref 4–16)
AST SERPL-CCNC: 26 IU/L (ref 15–37)
BASOPHILS ABSOLUTE: 0 K/CU MM
BASOPHILS RELATIVE PERCENT: 0.5 % (ref 0–1)
BILIRUB SERPL-MCNC: 0.1 MG/DL (ref 0–1)
BUN BLDV-MCNC: 16 MG/DL (ref 6–23)
CALCIUM SERPL-MCNC: 9.5 MG/DL (ref 8.3–10.6)
CHLORIDE BLD-SCNC: 105 MMOL/L (ref 99–110)
CO2: 24 MMOL/L (ref 21–32)
CREAT SERPL-MCNC: 1.1 MG/DL (ref 0.9–1.3)
DIFFERENTIAL TYPE: ABNORMAL
EOSINOPHILS ABSOLUTE: 0 K/CU MM
EOSINOPHILS RELATIVE PERCENT: 0 % (ref 0–3)
GFR AFRICAN AMERICAN: >60 ML/MIN/1.73M2
GFR NON-AFRICAN AMERICAN: >60 ML/MIN/1.73M2
GLUCOSE BLD-MCNC: 101 MG/DL (ref 70–99)
HCT VFR BLD CALC: 41.2 % (ref 42–52)
HEMOGLOBIN: 12.6 GM/DL (ref 13.5–18)
IMMATURE NEUTROPHIL %: 0.2 % (ref 0–0.43)
LYMPHOCYTES ABSOLUTE: 1.5 K/CU MM
LYMPHOCYTES RELATIVE PERCENT: 34.6 % (ref 24–44)
MCH RBC QN AUTO: 28.1 PG (ref 27–31)
MCHC RBC AUTO-ENTMCNC: 30.6 % (ref 32–36)
MCV RBC AUTO: 92 FL (ref 78–100)
MONOCYTES ABSOLUTE: 0.3 K/CU MM
MONOCYTES RELATIVE PERCENT: 7.4 % (ref 0–4)
NUCLEATED RBC %: 0 %
PDW BLD-RTO: 14.4 % (ref 11.7–14.9)
PLATELET # BLD: 229 K/CU MM (ref 140–440)
PMV BLD AUTO: 9.4 FL (ref 7.5–11.1)
POTASSIUM SERPL-SCNC: 4.2 MMOL/L (ref 3.5–5.1)
RBC # BLD: 4.48 M/CU MM (ref 4.6–6.2)
SEGMENTED NEUTROPHILS ABSOLUTE COUNT: 2.5 K/CU MM
SEGMENTED NEUTROPHILS RELATIVE PERCENT: 57.3 % (ref 36–66)
SODIUM BLD-SCNC: 140 MMOL/L (ref 135–145)
TOTAL IMMATURE NEUTOROPHIL: 0.01 K/CU MM
TOTAL NUCLEATED RBC: 0 K/CU MM
TOTAL PROTEIN: 7 GM/DL (ref 6.4–8.2)
WBC # BLD: 4.3 K/CU MM (ref 4–10.5)

## 2022-06-20 PROCEDURE — 2140000000 HC CCU INTERMEDIATE R&B

## 2022-06-20 PROCEDURE — 86901 BLOOD TYPING SEROLOGIC RH(D): CPT

## 2022-06-20 PROCEDURE — 86850 RBC ANTIBODY SCREEN: CPT

## 2022-06-20 PROCEDURE — 85025 COMPLETE CBC W/AUTO DIFF WBC: CPT

## 2022-06-20 PROCEDURE — 99285 EMERGENCY DEPT VISIT HI MDM: CPT

## 2022-06-20 PROCEDURE — 80053 COMPREHEN METABOLIC PANEL: CPT

## 2022-06-20 PROCEDURE — 86900 BLOOD TYPING SEROLOGIC ABO: CPT

## 2022-06-20 ASSESSMENT — ENCOUNTER SYMPTOMS
VOMITING: 0
ANAL BLEEDING: 1
ABDOMINAL PAIN: 0
DIARRHEA: 0
CONSTIPATION: 0
NAUSEA: 0
BLOOD IN STOOL: 1
SHORTNESS OF BREATH: 0
RECTAL PAIN: 0
BACK PAIN: 0

## 2022-06-21 ENCOUNTER — APPOINTMENT (OUTPATIENT)
Dept: CT IMAGING | Age: 67
DRG: 244 | End: 2022-06-21
Payer: MEDICAID

## 2022-06-21 LAB
ABO/RH: NORMAL
ANION GAP SERPL CALCULATED.3IONS-SCNC: 8 MMOL/L (ref 4–16)
ANTIBODY SCREEN: NEGATIVE
APTT: 31.4 SECONDS (ref 25.1–37.1)
BUN BLDV-MCNC: 11 MG/DL (ref 6–23)
CALCIUM SERPL-MCNC: 9.3 MG/DL (ref 8.3–10.6)
CHLORIDE BLD-SCNC: 105 MMOL/L (ref 99–110)
CO2: 27 MMOL/L (ref 21–32)
CREAT SERPL-MCNC: 1.1 MG/DL (ref 0.9–1.3)
GFR AFRICAN AMERICAN: >60 ML/MIN/1.73M2
GFR NON-AFRICAN AMERICAN: >60 ML/MIN/1.73M2
GLUCOSE BLD-MCNC: 104 MG/DL (ref 70–99)
HCT VFR BLD CALC: 42.3 % (ref 42–52)
HCT VFR BLD CALC: 45.4 % (ref 42–52)
HEMOGLOBIN: 13.2 GM/DL (ref 13.5–18)
HEMOGLOBIN: 14.5 GM/DL (ref 13.5–18)
INR BLD: 0.93 INDEX
IRON: 84 UG/DL (ref 59–158)
MCH RBC QN AUTO: 28.5 PG (ref 27–31)
MCHC RBC AUTO-ENTMCNC: 31.2 % (ref 32–36)
MCV RBC AUTO: 91.4 FL (ref 78–100)
PCT TRANSFERRIN: 31 % (ref 10–44)
PDW BLD-RTO: 14.3 % (ref 11.7–14.9)
PLATELET # BLD: 212 K/CU MM (ref 140–440)
PMV BLD AUTO: 9.6 FL (ref 7.5–11.1)
POTASSIUM SERPL-SCNC: 3.7 MMOL/L (ref 3.5–5.1)
PROTHROMBIN TIME: 12 SECONDS (ref 11.7–14.5)
RBC # BLD: 4.63 M/CU MM (ref 4.6–6.2)
SODIUM BLD-SCNC: 140 MMOL/L (ref 135–145)
TOTAL IRON BINDING CAPACITY: 272 UG/DL (ref 250–450)
UNSATURATED IRON BINDING CAPACITY: 188 UG/DL (ref 110–370)
WBC # BLD: 3.9 K/CU MM (ref 4–10.5)

## 2022-06-21 PROCEDURE — 6360000002 HC RX W HCPCS: Performed by: INTERNAL MEDICINE

## 2022-06-21 PROCEDURE — 85730 THROMBOPLASTIN TIME PARTIAL: CPT

## 2022-06-21 PROCEDURE — 6360000004 HC RX CONTRAST MEDICATION: Performed by: INTERNAL MEDICINE

## 2022-06-21 PROCEDURE — 36415 COLL VENOUS BLD VENIPUNCTURE: CPT

## 2022-06-21 PROCEDURE — A4216 STERILE WATER/SALINE, 10 ML: HCPCS | Performed by: INTERNAL MEDICINE

## 2022-06-21 PROCEDURE — C9113 INJ PANTOPRAZOLE SODIUM, VIA: HCPCS | Performed by: INTERNAL MEDICINE

## 2022-06-21 PROCEDURE — 6370000000 HC RX 637 (ALT 250 FOR IP): Performed by: INTERNAL MEDICINE

## 2022-06-21 PROCEDURE — 83540 ASSAY OF IRON: CPT

## 2022-06-21 PROCEDURE — 85014 HEMATOCRIT: CPT

## 2022-06-21 PROCEDURE — 2140000000 HC CCU INTERMEDIATE R&B

## 2022-06-21 PROCEDURE — 83550 IRON BINDING TEST: CPT

## 2022-06-21 PROCEDURE — 85018 HEMOGLOBIN: CPT

## 2022-06-21 PROCEDURE — 85027 COMPLETE CBC AUTOMATED: CPT

## 2022-06-21 PROCEDURE — 80048 BASIC METABOLIC PNL TOTAL CA: CPT

## 2022-06-21 PROCEDURE — 2580000003 HC RX 258: Performed by: INTERNAL MEDICINE

## 2022-06-21 PROCEDURE — 85610 PROTHROMBIN TIME: CPT

## 2022-06-21 PROCEDURE — 74178 CT ABD&PLV WO CNTR FLWD CNTR: CPT

## 2022-06-21 PROCEDURE — 94761 N-INVAS EAR/PLS OXIMETRY MLT: CPT

## 2022-06-21 RX ORDER — AMLODIPINE BESYLATE 10 MG/1
10 TABLET ORAL DAILY
Status: DISCONTINUED | OUTPATIENT
Start: 2022-06-21 | End: 2022-06-23 | Stop reason: HOSPADM

## 2022-06-21 RX ORDER — ACETAMINOPHEN 650 MG/1
650 SUPPOSITORY RECTAL EVERY 6 HOURS PRN
Status: DISCONTINUED | OUTPATIENT
Start: 2022-06-21 | End: 2022-06-23 | Stop reason: HOSPADM

## 2022-06-21 RX ORDER — ACETAMINOPHEN 325 MG/1
650 TABLET ORAL EVERY 6 HOURS PRN
Status: DISCONTINUED | OUTPATIENT
Start: 2022-06-21 | End: 2022-06-23 | Stop reason: HOSPADM

## 2022-06-21 RX ORDER — SODIUM CHLORIDE 9 MG/ML
INJECTION, SOLUTION INTRAVENOUS PRN
Status: DISCONTINUED | OUTPATIENT
Start: 2022-06-21 | End: 2022-06-23 | Stop reason: HOSPADM

## 2022-06-21 RX ORDER — SODIUM CHLORIDE 0.9 % (FLUSH) 0.9 %
5-40 SYRINGE (ML) INJECTION EVERY 12 HOURS SCHEDULED
Status: DISCONTINUED | OUTPATIENT
Start: 2022-06-21 | End: 2022-06-23 | Stop reason: HOSPADM

## 2022-06-21 RX ORDER — ONDANSETRON 2 MG/ML
4 INJECTION INTRAMUSCULAR; INTRAVENOUS EVERY 6 HOURS PRN
Status: DISCONTINUED | OUTPATIENT
Start: 2022-06-21 | End: 2022-06-23 | Stop reason: HOSPADM

## 2022-06-21 RX ORDER — SODIUM CHLORIDE 0.9 % (FLUSH) 0.9 %
5-40 SYRINGE (ML) INJECTION PRN
Status: DISCONTINUED | OUTPATIENT
Start: 2022-06-21 | End: 2022-06-23 | Stop reason: HOSPADM

## 2022-06-21 RX ORDER — ONDANSETRON 4 MG/1
4 TABLET, ORALLY DISINTEGRATING ORAL EVERY 8 HOURS PRN
Status: DISCONTINUED | OUTPATIENT
Start: 2022-06-21 | End: 2022-06-23 | Stop reason: HOSPADM

## 2022-06-21 RX ADMIN — AMLODIPINE BESYLATE 10 MG: 10 TABLET ORAL at 10:27

## 2022-06-21 RX ADMIN — SODIUM CHLORIDE, PRESERVATIVE FREE 10 ML: 5 INJECTION INTRAVENOUS at 10:28

## 2022-06-21 RX ADMIN — IOPAMIDOL 75 ML: 755 INJECTION, SOLUTION INTRAVENOUS at 13:33

## 2022-06-21 RX ADMIN — SODIUM CHLORIDE 80 MG: 9 INJECTION, SOLUTION INTRAMUSCULAR; INTRAVENOUS; SUBCUTANEOUS at 01:54

## 2022-06-21 NOTE — PROGRESS NOTES
Hospitalist Progress Note      Name:  Matthew Mack /Age/Sex: 1955  (77 y.o. male)   MRN & CSN:  [id number] & [id number] Admission Date/Time: 2022  8:35 PM   Location:  [address] [place] [address] PCP: No primary care provider on file. Hospital Day: 2  Discharge barrier/Reason for continued hospitalization: GI wants to pursue colonoscopy    Assessment and Plan:   Matthew Mack is a 78 y.o. [place] male with no past medical history of who presented to the ED on 2022 with bright red bleeding per rectum consult for GIB (gastrointestinal bleeding) ongoing for the last 1 week. 1.  BRBPR: likely lower GIB/outlet bleeding  Await GI eval  Monitor Hgb closely  Reported colonoscopy about 2 years ago but does not know the results    2. Elevated BP: not on any meds PTA  Good BP control may also help with bleeding  Start amlodipine 10 mg daily. Pt reports he should be able to afford it at dc    3. Language barrier: Eulalia Peña speaking  Use  for health care services    4. Lack of medical insurance: d/w     5. Baseline bradycardia: Avoid AV cheo-blockers    Diet Diet NPO Exceptions are: Ice Chips, Sips of Water with Meds   DVT Prophylaxis [] Lovenox, []  Heparin, [] SCDs, [] Ambulation   GI Prophylaxis [] PPI,  [] H2 Blocker,  [] Carafate,  [] Diet/Tube Feeds   Code Status Full Code   MDM [] Low, [] Moderate,[x]  High  > 50% of time spent in couseling and coordination of care     History of Present Illness:     Chief Complaint: GIB (gastrointestinal bleeding)     Matthew Mack is a 78 y.o.  male  who presents with lower abdominal cramp and BRBPR. Reports similar history 2 years ago and he does not know the results. His abdominal cramping is resolved at this time but he does not know if he is bleeding is resolved. He reports that he stopped for about 2 days in the last 1 week. Denies any fevers or chills. Denies any lightheadedness.   Denies any night sweats or weight loss.  He is visiting [facility] for 3 months and he plans to go back to [facility]. He could follow-up with a GI doctor in [facility]. Ten point ROS reviewed , negative, unless as noted above    Objective:   No intake or output data in the 24 hours ending 06/21/22 0916     Vitals:   Vitals:    06/20/22 2100 06/20/22 2245 06/21/22 0000 06/21/22 0115   BP: (!) 174/116 (!) 183/98 (!) 172/97 (!) 182/108   Pulse:    54   Resp:    18   Temp:    97.4 °F (36.3 °C)   TempSrc:    Oral   SpO2: 99% 99% 98% 99%   Weight:    140 lb 9.6 oz (63.8 kg)   Height:    5' 5\" (1.651 m)        Physical Exam:   GEN: Awake male, alert and oriented x 3 in no apparent distress. Appears given age. HEENT: Normal   RESP: Clear lung fields bilaterally. Symmetric chest movement while on RA  CVS: RRR, S1, S2  GI/: Abdomen is soft, nontender, no organomegaly. . Bowel sounds normal, rectal exam deferred. No CVA tenderness. MSK: No gross joint deformities. No tenderness  SKIN: Normal coloration, warm, dry. NEURO: Cranial nerves appear grossly intact, normal speech, no lateralizing weakness.   PSYCH:  Affect appropriate    Medications:   Medications:    sodium chloride flush  5-40 mL IntraVENous 2 times per day    [START ON 6/22/2022] pantoprazole (PROTONIX) 40 mg injection  40 mg IntraVENous Q24H    amLODIPine  10 mg Oral Daily      Infusions:    sodium chloride       PRN Meds: sodium chloride flush, 5-40 mL, PRN  sodium chloride, , PRN  ondansetron, 4 mg, Q8H PRN   Or  ondansetron, 4 mg, Q6H PRN  acetaminophen, 650 mg, Q6H PRN   Or  acetaminophen, 650 mg, Q6H PRN          Electronically signed by Galindo Reese MD on 6/21/2022 at 9:16 AM

## 2022-06-21 NOTE — PLAN OF CARE
Problem: Discharge Planning  Goal: Discharge to home or other facility with appropriate resources  Outcome: Progressing  Flowsheets (Taken 6/21/2022 0115)  Discharge to home or other facility with appropriate resources:   Identify barriers to discharge with patient and caregiver   Arrange for needed discharge resources and transportation as appropriate   Identify discharge learning needs (meds, wound care, etc)   Arrange for interpreters to assist at discharge as needed   Refer to discharge planning if patient needs post-hospital services based on physician order or complex needs related to functional status, cognitive ability or social support system     Problem: Safety - Adult  Goal: Free from fall injury  Outcome: Progressing

## 2022-06-21 NOTE — PROGRESS NOTES
Patient denies being on any home medication and that he just came to [place] a month ago. Information obtained per lamar.

## 2022-06-21 NOTE — CARE COORDINATION
Received referral from [address]. Patient is not a [place] Citizen and will not qualify for Medicaid. Agree to follow for possible help with new medications at discharge.

## 2022-06-21 NOTE — CONSULTS
[address] [place] [address], [address] [facility]                                  CONSULTATION    PATIENT NAME: Blank Reyes                     :        1955  MED REC NO:   [id number]                          ROOM:       8022  ACCOUNT NO:   [de-identified]                           ADMIT DATE: 2022  PROVIDER:     George Perez MD    CONSULT DATE:  2022    CHIEF COMPLAINT:  History of rectal bleeding. HISTORY OF PRESENT ILLNESS:  The patient is a 65-year-old [place] male  with past medical history significant for hypertension and also a  bladder stone, who presented to the emergency room last night with  1-week history of bright red blood per rectum. According to the  patient, the bleeding started about 1 week ago, then it subsided for  about 3 days but had recurrent bleeding last evening which prompted him  to come to the emergency room. In the ER, the patient had a chem  profile and LFTs drawn which was unremarkable. CBC showed a WBC count  of 4.3, hemoglobin 12.6, platelet count of [phone],820. Today, the  patient's hemoglobin is 13.2 gm%. The patient denies abdominal pain,  nausea, vomiting, hematemesis, anorexia or weight loss. The patient is  hemodynamically stable. The patient faintly recalls having had a  colonoscopy done in the remote past while in [facility]. The patient is on  Protonix as well. REVIEW OF SYSTEMS:  CENTRAL NERVOUS SYSTEM:  The patient denies headache or focal  sensorimotor symptoms. CARDIOVASCULAR SYSTEM:  No history of chest pain, shortness of breath or  leg swelling. GENITOURINARY SYSTEM:  No history of dysuria, pyuria, or hematuria. MUSCULOSKELETAL SYSTEM:  No history of aches or pains in muscles or  joints. RESPIRATORY SYSTEM:  No history of cough, hemoptysis, fever or chills. PAST MEDICAL HISTORY:  Significant for history of hypertension. FAMILY HISTORY:  Noncontributory.     MEDICATIONS: Please refer to the chart. The patient denies taking  NSAIDs or anticoagulants. SURGERIES:  The patient has had surgery done to remove bladder calculus  on 10/13/2020. ALLERGIES:  No known drug allergies. PHYSICAL EXAMINATION:  GENERAL:  Shows 70-year-old black male of average build and nutritional  status, who is lying comfortably flat in bed, in no acute distress. He  is awake, alert and oriented and pleasant to talk with. VITAL SIGNS:  Stable. HEENT:  Examination shows skull to be atraumatic. NECK:  Supple. CHEST:  Clear. HEART:  S1 and S2 are normal.  ABDOMEN:  Soft, nontender, and nondistended. Liver and spleen are not  palpable. Bowel sounds are present. RECTAL:  Exam is deferred. CNS:  Exam shows the patient to be awake, alert and oriented. There are  no focal sensorimotor signs. MUSCULOSKELETAL SYSTEM:  Exam is unremarkable. LABORATORY DATA:  As above mentioned. IMPRESSION:  A 70-year-old [place] male with a history of hypertension,  presents with 1-week history of hematochezia, rule out lower GI  bleeding, etiology to be determined. RECOMMENDATIONS:  1. Agree with present management. 2.  We will get a CAT scan of the abdomen and pelvis today. 3.  Monitor the patient's serial H and H and transfuse on a p.r.n. basis  to keep hemoglobin above 7 gm%. 4.  We will prep the colon tomorrow and proceed with colonoscopy on  06/23/2022.  5.  The case and plan has been discussed in detail with the patient  through the  and was also discussed with the bedside RN Salena  as well. EGD and small bowel evaluation later if needed.         Axel Girard MD    D: 06/21/2022 11:21:44       T: 06/21/2022 11:24:07     [id number]  Job#: [id number]     Doc#: [id number]    CC:

## 2022-06-21 NOTE — ED PROVIDER NOTES
**EVALUATED BY YONI**    [address]  eMERGENCY dEPARTMENT eNCOUnter    Pt Name: Lukas Lenz  MRN: [id number]  [place] 1955  Date of evaluation: 6/20/2022  Provider: MATTY Sepulveda    Chief Complaint:    Chief Complaint   Patient presents with    Rectal Bleeding       Nursing Notes, Past Medical Hx, Past Surgical Hx, Social Hx, Allergies, and Family Hx were all reviewed and agreedwith or any disagreements were addressed in the HPI.    HPI:  (Location, Duration, Timing, Severity, Quality, Assoc Sx, Context, Modifying factors)  This is a  78 y.o. male who presents to the emergency department with complaints of rectal bleeding intermittently. Speaks [place],  line used. Patient denies any abdominal pain, n/v/d/c. No urinary symptoms. Hasn't had this evaluated previously. Does not take anticoagulants or antiplatelets. Patient states that he is starting to feel fatigued and weak and this is why he came in for evaluation. Patient states that initially it started about a week ago and it stopped however it started back up today. He endorses a lot of blood within the toilet bowl and on the stool itself. Denies any rectal pain associated with this. All other systems were reviewed and are negative. Past Medical/Surgical History:      Diagnosis Date    Hypertension      History reviewed. No pertinent surgical history. Medications:  Previous Medications    METOPROLOL TARTRATE (LOPRESSOR) 25 MG TABLET    Take 1 tablet by mouth 2 times daily    TAMSULOSIN (FLOMAX) 0.4 MG CAPSULE    Take 1 capsule by mouth daily       Review of Systems:  Review of Systems   Constitutional: Positive for fatigue. Negative for chills and fever. Respiratory: Negative for shortness of breath. Cardiovascular: Negative for chest pain. Gastrointestinal: Positive for anal bleeding and blood in stool.  Negative for abdominal pain, constipation, diarrhea, nausea, rectal pain and vomiting. Genitourinary: Negative for decreased urine volume, difficulty urinating, dysuria, hematuria and urgency. Musculoskeletal: Negative for back pain. Neurological: Positive for light-headedness. Negative for weakness, numbness and headaches. All other systems reviewed and are negative. Positives and Pertinent negatives as per HPI. Except as noted above in the ROS, all other systems were reviewed/completed and are negative. Physical Exam:  Physical Exam  Vitals and nursing note reviewed. Constitutional:       Appearance: Normal appearance. He is well-developed. He is not toxic-appearing or diaphoretic. HENT:      Head: Normocephalic. Right Ear: External ear normal.      Left Ear: External ear normal.      Nose: Nose normal.   Eyes:      General:         Right eye: No discharge. Left eye: No discharge. Conjunctiva/sclera: Conjunctivae normal.   Cardiovascular:      Rate and Rhythm: Normal rate and regular rhythm. Heart sounds: Normal heart sounds. No murmur heard. No friction rub. No gallop. Pulmonary:      Effort: Pulmonary effort is normal. No respiratory distress. Breath sounds: Normal breath sounds. No wheezing or rales. Abdominal:      General: Abdomen is flat. Bowel sounds are normal. There is no distension. Palpations: Abdomen is soft. There is no mass. Tenderness: There is no abdominal tenderness. Genitourinary:     Rectum: Guaiac result positive. Musculoskeletal:         General: Normal range of motion. Cervical back: Normal range of motion and neck supple. Skin:     General: Skin is warm and dry. Coloration: Skin is not pale. Neurological:      Mental Status: He is alert and oriented to person, place, and time. Psychiatric:         Behavior: Behavior normal. Behavior is cooperative.      MEDICAL DECISION MAKING    Vitals:    Vitals:    06/20/22 2040 06/20/22 2100 06/20/22 2245   BP: (!) 186/114 (!) 174/116 (!) 183/98   Pulse: 66     Resp: 24     Temp: 98.7 °F (37.1 °C)     TempSrc: Oral     SpO2: 99% 99% 99%   Weight: 160 lb (72.6 kg)     Height: 5' 5\" (1.651 m)         LABS:   Results for orders placed or performed during the hospital encounter of 06/20/22   CBC with Auto Differential   Result Value Ref Range    WBC 4.3 4.0 - 10.5 K/CU MM    RBC 4.48 (L) 4.6 - 6.2 M/CU MM    Hemoglobin 12.6 (L) 13.5 - 18.0 GM/DL    Hematocrit 41.2 (L) 42 - 52 %    MCV 92.0 78 - 100 FL    MCH 28.1 27 - 31 PG    MCHC 30.6 (L) 32.0 - 36.0 %    RDW 14.4 11.7 - 14.9 %    Platelets 945 875 - 875 K/CU MM    MPV 9.4 7.5 - 11.1 FL    Differential Type AUTOMATED DIFFERENTIAL     Segs Relative 57.3 36 - 66 %    Lymphocytes % 34.6 24 - 44 %    Monocytes % 7.4 (H) 0 - 4 %    Eosinophils % 0.0 0 - 3 %    Basophils % 0.5 0 - 1 %    Segs Absolute 2.5 K/CU MM    Lymphocytes Absolute 1.5 K/CU MM    Monocytes Absolute 0.3 K/CU MM    Eosinophils Absolute 0.0 K/CU MM    Basophils Absolute 0.0 K/CU MM    Nucleated RBC % 0.0 %    Total Nucleated RBC 0.0 K/CU MM    Total Immature Neutrophil 0.01 K/CU MM    Immature Neutrophil % 0.2 0 - 0.43 %   Comprehensive Metabolic Panel w/ Reflex to MG   Result Value Ref Range    Sodium 140 135 - 145 MMOL/L    Potassium 4.2 3.5 - 5.1 MMOL/L    Chloride 105 99 - 110 mMol/L    CO2 24 21 - 32 MMOL/L    BUN 16 6 - 23 MG/DL    CREATININE 1.1 0.9 - 1.3 MG/DL    Glucose 101 (H) 70 - 99 MG/DL    Calcium 9.5 8.3 - 10.6 MG/DL    Albumin 4.1 3.4 - 5.0 GM/DL    Total Protein 7.0 6.4 - 8.2 GM/DL    Total Bilirubin 0.1 0.0 - 1.0 MG/DL    ALT 13 10 - 40 U/L    AST 26 15 - 37 IU/L    Alkaline Phosphatase 97 40 - 129 IU/L    GFR Non-African American >60 >60 mL/min/1.73m2    GFR African American >60 >60 mL/min/1.73m2    Anion Gap 11 4 - 16       Remainder of labs reviewed and were negative at this time or not returned at the time of this note.     RADIOLOGY:   Non-plain film images suchas CT, Ultrasound and MRI are read by the radiologist. I, MATTY Dobson have directly visualized the radiologic plain film image(s) with the below findings:  Interpretation per the Radiologist below, if available at the time of this note:    No orders to display     [address] / ED COURSE:      PROCEDURES:   Procedures    Patient was given:  Medications - No data to display  Patient presents to the emergency department with bright red blood per rectum. On exam abdomen is benign. Blood pressure 174/116. Heart rate normal.  Rectal exam reveals no evidence of external hemorrhoids. Patient did attempt to have a bowel movement on a bedside commode, toilet is got a large amount of blood, no evidence of stool though. Patient will need evaluation for bright red blood per rectum. Will discuss with hospitalist for admission. At this time no indication for blood transfusion, has a normal hemoglobin. Consulted with GI, who will consult. Clear liquid diet. The patient tolerated their visit well. I have evaluated the patient with physician available for consultation as needed. I have discussed the findings of today's workup with the patient and addressed the patient's questions and concerns. CLINICAL IMPRESSION:  1. Rectal bleeding      DISPOSITION  admit    PATIENT REFERRED TO:  No follow-up provider specified.     DISCHARGE MEDICATIONS:  New Prescriptions    No medications on file              (Please note the MDM and HPI sections of this note were completed with avoice recognition program.  Efforts were made to edit the dictations but occasionally words are mis-transcribed.)    Electronically signed, MATTY Jauregui,             MATTY Hobson  06/20/22 1566

## 2022-06-21 NOTE — PROGRESS NOTES
4 Eyes Skin Assessment     NAME:  Lizette Colindres  YOB: 1955  MEDICAL RECORD NUMBER:  [id number]    The patient is being assess for  Admission    I agree that 2 RN's have performed a thorough Head to Toe Skin Assessment on the patient. ALL assessment sites listed below have been assessed. Areas assessed by both nurses:    Other head, shoulders, chest, back upper arms, feet, patient refused to remove pants to assess groin and         Does the Patient have a Wound?  No noted wound(s)       Buck Prevention initiated:  NA   Wound Care Orders initiated:  NA    Pressure Injury (Stage 3,4, Unstageable, DTI, NWPT, and Complex wounds) if present place consult order under [de-identified] NA    New and Established Ostomies if present place consult order under : NA      Nurse 1 eSignature: Electronically signed by Dominga Cárdenas RN on 6/21/22 at 3:55 AM EDT    **SHARE this note so that the co-signing nurse is able to place an eSignature**    Nurse 2 eSignature: {Esignature:[id number]}

## 2022-06-21 NOTE — CARE COORDINATION
Pt is not a [place] citizen and speaks [place]. Pt is independent. Pt does not have a PCP or insurance. Referral made to financial counselor [facility] and [facility].  [facility]ist to follow. FC to see pt. PCP list added to d/c instructions. Notify CM if any new d/c needs arise.   TE

## 2022-06-22 ENCOUNTER — ANESTHESIA EVENT (OUTPATIENT)
Dept: ENDOSCOPY | Age: 67
DRG: 244 | End: 2022-06-22
Payer: MEDICAID

## 2022-06-22 LAB
ALBUMIN SERPL-MCNC: 4.2 GM/DL (ref 3.4–5)
ALP BLD-CCNC: 80 IU/L (ref 40–129)
ALT SERPL-CCNC: 13 U/L (ref 10–40)
AMYLASE: 159 U/L (ref 25–115)
ANION GAP SERPL CALCULATED.3IONS-SCNC: 10 MMOL/L (ref 4–16)
APTT: 29.7 SECONDS (ref 25.1–37.1)
AST SERPL-CCNC: 26 IU/L (ref 15–37)
BASOPHILS ABSOLUTE: 0 K/CU MM
BASOPHILS RELATIVE PERCENT: 0.5 % (ref 0–1)
BILIRUB SERPL-MCNC: 0.7 MG/DL (ref 0–1)
BUN BLDV-MCNC: 14 MG/DL (ref 6–23)
CALCIUM SERPL-MCNC: 9.7 MG/DL (ref 8.3–10.6)
CHLORIDE BLD-SCNC: 103 MMOL/L (ref 99–110)
CO2: 25 MMOL/L (ref 21–32)
CREAT SERPL-MCNC: 1.2 MG/DL (ref 0.9–1.3)
DIFFERENTIAL TYPE: ABNORMAL
EOSINOPHILS ABSOLUTE: 0 K/CU MM
EOSINOPHILS RELATIVE PERCENT: 0 % (ref 0–3)
GFR AFRICAN AMERICAN: >60 ML/MIN/1.73M2
GFR NON-AFRICAN AMERICAN: >60 ML/MIN/1.73M2
GLUCOSE BLD-MCNC: 86 MG/DL (ref 70–99)
HCT VFR BLD CALC: 45.8 % (ref 42–52)
HEMOGLOBIN: 14.8 GM/DL (ref 13.5–18)
IMMATURE NEUTROPHIL %: 0.3 % (ref 0–0.43)
INR BLD: 0.9 INDEX
LIPASE: 74 IU/L (ref 13–60)
LYMPHOCYTES ABSOLUTE: 1.7 K/CU MM
LYMPHOCYTES RELATIVE PERCENT: 43.1 % (ref 24–44)
MCH RBC QN AUTO: 29.1 PG (ref 27–31)
MCHC RBC AUTO-ENTMCNC: 32.3 % (ref 32–36)
MCV RBC AUTO: 90 FL (ref 78–100)
MONOCYTES ABSOLUTE: 0.3 K/CU MM
MONOCYTES RELATIVE PERCENT: 8.4 % (ref 0–4)
NUCLEATED RBC %: 0 %
PDW BLD-RTO: 14.1 % (ref 11.7–14.9)
PLATELET # BLD: 242 K/CU MM (ref 140–440)
PMV BLD AUTO: 9.5 FL (ref 7.5–11.1)
POTASSIUM SERPL-SCNC: 4 MMOL/L (ref 3.5–5.1)
PROTHROMBIN TIME: 11.6 SECONDS (ref 11.7–14.5)
RBC # BLD: 5.09 M/CU MM (ref 4.6–6.2)
SEGMENTED NEUTROPHILS ABSOLUTE COUNT: 1.9 K/CU MM
SEGMENTED NEUTROPHILS RELATIVE PERCENT: 47.7 % (ref 36–66)
SODIUM BLD-SCNC: 138 MMOL/L (ref 135–145)
TOTAL IMMATURE NEUTOROPHIL: 0.01 K/CU MM
TOTAL NUCLEATED RBC: 0 K/CU MM
TOTAL PROTEIN: 7.5 GM/DL (ref 6.4–8.2)
WBC # BLD: 3.9 K/CU MM (ref 4–10.5)

## 2022-06-22 PROCEDURE — 2580000003 HC RX 258: Performed by: INTERNAL MEDICINE

## 2022-06-22 PROCEDURE — A4216 STERILE WATER/SALINE, 10 ML: HCPCS | Performed by: INTERNAL MEDICINE

## 2022-06-22 PROCEDURE — 83690 ASSAY OF LIPASE: CPT

## 2022-06-22 PROCEDURE — 6370000000 HC RX 637 (ALT 250 FOR IP): Performed by: INTERNAL MEDICINE

## 2022-06-22 PROCEDURE — 85018 HEMOGLOBIN: CPT

## 2022-06-22 PROCEDURE — C9113 INJ PANTOPRAZOLE SODIUM, VIA: HCPCS | Performed by: INTERNAL MEDICINE

## 2022-06-22 PROCEDURE — 6370000000 HC RX 637 (ALT 250 FOR IP): Performed by: SPECIALIST

## 2022-06-22 PROCEDURE — 2140000000 HC CCU INTERMEDIATE R&B

## 2022-06-22 PROCEDURE — 85610 PROTHROMBIN TIME: CPT

## 2022-06-22 PROCEDURE — 80053 COMPREHEN METABOLIC PANEL: CPT

## 2022-06-22 PROCEDURE — 6360000002 HC RX W HCPCS: Performed by: INTERNAL MEDICINE

## 2022-06-22 PROCEDURE — 85730 THROMBOPLASTIN TIME PARTIAL: CPT

## 2022-06-22 PROCEDURE — 85025 COMPLETE CBC W/AUTO DIFF WBC: CPT

## 2022-06-22 PROCEDURE — 36415 COLL VENOUS BLD VENIPUNCTURE: CPT

## 2022-06-22 PROCEDURE — 94761 N-INVAS EAR/PLS OXIMETRY MLT: CPT

## 2022-06-22 PROCEDURE — 82150 ASSAY OF AMYLASE: CPT

## 2022-06-22 RX ADMIN — AMLODIPINE BESYLATE 10 MG: 10 TABLET ORAL at 09:05

## 2022-06-22 RX ADMIN — SODIUM CHLORIDE 40 MG: 9 INJECTION, SOLUTION INTRAMUSCULAR; INTRAVENOUS; SUBCUTANEOUS at 05:51

## 2022-06-22 RX ADMIN — SODIUM CHLORIDE, PRESERVATIVE FREE 10 ML: 5 INJECTION INTRAVENOUS at 09:05

## 2022-06-22 RX ADMIN — SODIUM CHLORIDE, PRESERVATIVE FREE 10 ML: 5 INJECTION INTRAVENOUS at 21:00

## 2022-06-22 RX ADMIN — POLYETHYLENE GLYCOL 3350, SODIUM SULFATE ANHYDROUS, SODIUM BICARBONATE, SODIUM CHLORIDE, POTASSIUM CHLORIDE 4000 ML: 236; 22.74; 6.74; 5.86; 2.97 POWDER, FOR SOLUTION ORAL at 17:49

## 2022-06-22 NOTE — PROGRESS NOTES
Drink started with patient and explained instructions with interpretor. Pt understood to drink 8oz roughly every 10 minutes and agrees to the process and treatment. Pt was appreciative of doctors working to solve the issue with the bloody bowels. Will continue to monitor and offer toilet breaks more frequently.

## 2022-06-22 NOTE — PROGRESS NOTES
Pt had bowel movement this Am @ 0913. Liquid blood present around stool. Pt said no pain as via  and via word of mouth from this nurse by asking \"doule\" which is pain in [place]. Pt returned back to bed and seems satisfied.  Will continue to monitor

## 2022-06-22 NOTE — ANESTHESIA PRE PROCEDURE
Problem List   Diagnosis Code    Clot retention of urine R33.8    GIB (gastrointestinal bleeding) K92.2       Past Medical History:        Diagnosis Date    Hypertension        Past Surgical History:        Procedure Laterality Date    ABDOMEN SURGERY         Social History:    Social History     Tobacco Use    Smoking status: Never Smoker    Smokeless tobacco: Never Used   Substance Use Topics    Alcohol use: Not Currently                                Counseling given: Not Answered      Vital Signs (Current):   Vitals:    06/22/22 0602 06/22/22 0803 06/22/22 0900 06/22/22 1107   BP: 133/85  (!) 144/93 (!) 149/91   Pulse: 53 50 54    Resp: 14 16 19    Temp:   37 °C (98.6 °F) 36.8 °C (98.2 °F)   TempSrc:   Oral Oral   SpO2: 99% 97% 98%    Weight:       Height:                                                  BP Readings from Last 3 Encounters:   06/22/22 (!) 149/91   11/01/20 (!) 153/88   10/30/20 (!) 105/90       NPO Status:                                                                                 BMI:   Wt Readings from Last 3 Encounters:   06/21/22 140 lb 9.6 oz (63.8 kg)   11/01/20 155 lb (70.3 kg)   10/28/20 157 lb (71.2 kg)     Body mass index is 23.4 kg/m². CBC:   Lab Results   Component Value Date    WBC 3.9 06/22/2022    RBC 5.09 06/22/2022    HGB 14.8 06/22/2022    HCT 45.8 06/22/2022    MCV 90.0 06/22/2022    RDW 14.1 06/22/2022     06/22/2022       CMP:   Lab Results   Component Value Date     06/22/2022    K 4.0 06/22/2022     06/22/2022    CO2 25 06/22/2022    BUN 14 06/22/2022    CREATININE 1.2 06/22/2022    GFRAA >60 06/22/2022    LABGLOM >60 06/22/2022    GLUCOSE 86 06/22/2022    PROT 7.5 06/22/2022    CALCIUM 9.7 06/22/2022    BILITOT 0.7 06/22/2022    ALKPHOS 80 06/22/2022    AST 26 06/22/2022    ALT 13 06/22/2022       POC Tests: No results for input(s): POCGLU, POCNA, POCK, POCCL, POCBUN, POCHEMO, POCHCT in the last 72 hours.     Coags:   Lab Results Component Value Date    PROTIME 11.6 06/22/2022    INR 0.90 06/22/2022    APTT 29.7 06/22/2022       HCG (If Applicable): No results found for: PREGTESTUR, PREGSERUM, HCG, HCGQUANT     ABGs: No results found for: PHART, PO2ART, OHF4MFZ, IRX3EIX, BEART, G4CDXHAC     Type & Screen (If Applicable):  No results found for: LABABO, LABRH    Drug/Infectious Status (If Applicable):  No results found for: HIV, HEPCAB    COVID-19 Screening (If Applicable):   Lab Results   Component Value Date    COVID19 NOT DETECTED 10/30/2020           Anesthesia Evaluation  Patient summary reviewed no history of anesthetic complications:   Airway: Mallampati: I  TM distance: >3 FB   Neck ROM: full  Mouth opening: > = 3 FB   Dental:    (+) poor dentition      Pulmonary:Negative Pulmonary ROS and normal exam                               Cardiovascular:  Exercise tolerance: good (>4 METS),   (+) hypertension:,          Beta Blocker:  Not on Beta Blocker         Neuro/Psych:   Negative Neuro/Psych ROS              GI/Hepatic/Renal:   (+) bowel prep,          ROS comment: History of rectal bleeding. .   Endo/Other: Negative Endo/Other ROS                    Abdominal:             Vascular: negative vascular ROS. Other Findings:           Anesthesia Plan      MAC     ASA 2       Induction: intravenous. Anesthetic plan and risks discussed with patient ([place]  via iPad). Plan discussed with CRNA. RACHEL Millard - CRNA   6/22/2022     Pre Anesthesia Assessment complete.  Chart reviewed on 6/22/2022

## 2022-06-22 NOTE — PROGRESS NOTES
Hospitalist Progress Note      Name:  Augusta Stone /Age/Sex: 1955  (77 y.o. male)   MRN & CSN:  [id number] & [id number] Admission Date/Time: 2022  8:35 PM   Location:  [address] [place] [address] PCP: No primary care provider on file. Hospital Day: 3  Discharge barrier/Reason for continued hospitalization: Inpatient colonoscopy. Assessment and Plan:   Augusta Stone is a 78 y.o. [place] male with no past medical history of who presented to the ED on 2022 with bright red bleeding per rectum consult for GIB (gastrointestinal bleeding) ongoing for the last 1 week. 1.  BRBPR: likely diverticular bleed versus hemorrhoidal bleed. Hemoglobin has thankfully remained stable. CT abdomen with mild diverticulosis. GI plans on colonoscopy for evaluation in-house. 2.  Hypertension: New diagnosis. Fair control on amlodipine 10 mg daily. 3.  Language barrier: Haitaian creole speaking  Use / service. 4. Lack of medical insurance: Not eligible for any form of assistance    5. Baseline bradycardia: Avoid AV cheo-blockers    Diet ADULT DIET; Clear Liquid  Diet NPO Exceptions are: Ice Chips, Sips of Water with Meds   DVT Prophylaxis [] Lovenox, []  Heparin, [] SCDs, [] Ambulation   GI Prophylaxis [] PPI,  [] H2 Blocker,  [] Carafate,  [] Diet/Tube Feeds   Code Status Full Code   MDM [] Low, [x] Moderate,[]  High       History of Present Illness:     Chief Complaint: GIB (gastrointestinal bleeding)     Augusta Stone is a 78 y.o.  male  who presents with lower abdominal cramp and BRBPR. Reports similar history 2 years ago and he does not know the results. His abdominal cramping is resolved at this time but he does not know if he is bleeding is resolved. He reports that he stopped for about 2 days in the last 1 week. Denies any fevers or chills. Denies any lightheadedness. Denies any night sweats or weight loss.   He is visiting [facility] for 3 months and he plans to go back to [facility]. He could follow-up with a GI doctor in [facility]. 6/22/2022: Still having ongoing bleed. Still had a bowel movement this morning with surrounding blood. No more abdominal pain. Ten point ROS reviewed , negative, unless as noted above    Objective: Intake/Output Summary (Last 24 hours) at 6/22/2022 1328  Last data filed at 6/22/2022 0402  Gross per 24 hour   Intake --   Output 1150 ml   Net -1150 ml        Vitals:   Vitals:    06/22/22 0602 06/22/22 0803 06/22/22 0900 06/22/22 1107   BP: 133/85  (!) 144/93 (!) 149/91   Pulse: 53 50 54    Resp: 14 16 19    Temp:   98.6 °F (37 °C) 98.2 °F (36.8 °C)   TempSrc:   Oral Oral   SpO2: 99% 97% 98%    Weight:       Height:            Physical Exam:   GEN: Awake male, alert and oriented x 3 in no apparent distress. Appears given age. HEENT: Normal   RESP: Clear lung fields bilaterally. Symmetric chest movement while on RA  CVS: RRR, S1, S2  GI/: Abdomen is soft, nontender, no organomegaly. . Bowel sounds normal, rectal exam deferred. No CVA tenderness. MSK: No gross joint deformities. No tenderness  SKIN: Normal coloration, warm, dry. NEURO: Cranial nerves appear grossly intact, normal speech, no lateralizing weakness.   PSYCH:  Affect appropriate    Medications:   Medications:    polyethylene glycol  4,000 mL Oral Once    sodium chloride flush  5-40 mL IntraVENous 2 times per day    pantoprazole (PROTONIX) 40 mg injection  40 mg IntraVENous Q24H    amLODIPine  10 mg Oral Daily      Infusions:    sodium chloride       PRN Meds: sodium chloride flush, 5-40 mL, PRN  sodium chloride, , PRN  ondansetron, 4 mg, Q8H PRN   Or  ondansetron, 4 mg, Q6H PRN  acetaminophen, 650 mg, Q6H PRN   Or  acetaminophen, 650 mg, Q6H PRN          Electronically signed by Livier Charles MD on 6/22/2022 at 1:28 PM

## 2022-06-23 ENCOUNTER — ANESTHESIA (OUTPATIENT)
Dept: ENDOSCOPY | Age: 67
DRG: 244 | End: 2022-06-23
Payer: MEDICAID

## 2022-06-23 VITALS
TEMPERATURE: 97.5 F | HEIGHT: 65 IN | DIASTOLIC BLOOD PRESSURE: 83 MMHG | OXYGEN SATURATION: 99 % | BODY MASS INDEX: 24.89 KG/M2 | HEART RATE: 60 BPM | WEIGHT: 149.4 LBS | RESPIRATION RATE: 16 BRPM | SYSTOLIC BLOOD PRESSURE: 138 MMHG

## 2022-06-23 LAB
ALBUMIN SERPL-MCNC: 4.7 GM/DL (ref 3.4–5)
ALP BLD-CCNC: 88 IU/L (ref 40–128)
ALT SERPL-CCNC: 15 U/L (ref 10–40)
AMYLASE: 156 U/L (ref 25–115)
ANION GAP SERPL CALCULATED.3IONS-SCNC: 9 MMOL/L (ref 4–16)
APTT: 30.9 SECONDS (ref 25.1–37.1)
AST SERPL-CCNC: 27 IU/L (ref 15–37)
BASOPHILS ABSOLUTE: 0 K/CU MM
BASOPHILS RELATIVE PERCENT: 0.5 % (ref 0–1)
BILIRUB SERPL-MCNC: 0.7 MG/DL (ref 0–1)
BUN BLDV-MCNC: 14 MG/DL (ref 6–23)
CALCIUM SERPL-MCNC: 10 MG/DL (ref 8.3–10.6)
CHLORIDE BLD-SCNC: 98 MMOL/L (ref 99–110)
CO2: 30 MMOL/L (ref 21–32)
CREAT SERPL-MCNC: 1.1 MG/DL (ref 0.9–1.3)
DIFFERENTIAL TYPE: ABNORMAL
EOSINOPHILS ABSOLUTE: 0 K/CU MM
EOSINOPHILS RELATIVE PERCENT: 0.5 % (ref 0–3)
GFR AFRICAN AMERICAN: >60 ML/MIN/1.73M2
GFR NON-AFRICAN AMERICAN: >60 ML/MIN/1.73M2
GLUCOSE BLD-MCNC: 79 MG/DL (ref 70–99)
HCT VFR BLD CALC: 48.3 % (ref 42–52)
HEMOGLOBIN: 15.1 GM/DL (ref 13.5–18)
IMMATURE NEUTROPHIL %: 0.3 % (ref 0–0.43)
INR BLD: 0.92 INDEX
LIPASE: 61 IU/L (ref 13–60)
LYMPHOCYTES ABSOLUTE: 1.8 K/CU MM
LYMPHOCYTES RELATIVE PERCENT: 46 % (ref 24–44)
MCH RBC QN AUTO: 28.3 PG (ref 27–31)
MCHC RBC AUTO-ENTMCNC: 31.3 % (ref 32–36)
MCV RBC AUTO: 90.6 FL (ref 78–100)
MONOCYTES ABSOLUTE: 0.3 K/CU MM
MONOCYTES RELATIVE PERCENT: 6.7 % (ref 0–4)
NUCLEATED RBC %: 0 %
PDW BLD-RTO: 14.1 % (ref 11.7–14.9)
PLATELET # BLD: 241 K/CU MM (ref 140–440)
PMV BLD AUTO: 9.8 FL (ref 7.5–11.1)
POTASSIUM SERPL-SCNC: 4.4 MMOL/L (ref 3.5–5.1)
PROTHROMBIN TIME: 11.8 SECONDS (ref 11.7–14.5)
RBC # BLD: 5.33 M/CU MM (ref 4.6–6.2)
SEGMENTED NEUTROPHILS ABSOLUTE COUNT: 1.8 K/CU MM
SEGMENTED NEUTROPHILS RELATIVE PERCENT: 46 % (ref 36–66)
SODIUM BLD-SCNC: 137 MMOL/L (ref 135–145)
TOTAL IMMATURE NEUTOROPHIL: 0.01 K/CU MM
TOTAL NUCLEATED RBC: 0 K/CU MM
TOTAL PROTEIN: 7.9 GM/DL (ref 6.4–8.2)
WBC # BLD: 3.9 K/CU MM (ref 4–10.5)

## 2022-06-23 PROCEDURE — 2580000003 HC RX 258

## 2022-06-23 PROCEDURE — 2500000003 HC RX 250 WO HCPCS

## 2022-06-23 PROCEDURE — 2580000003 HC RX 258: Performed by: INTERNAL MEDICINE

## 2022-06-23 PROCEDURE — 85730 THROMBOPLASTIN TIME PARTIAL: CPT

## 2022-06-23 PROCEDURE — 6370000000 HC RX 637 (ALT 250 FOR IP): Performed by: SPECIALIST

## 2022-06-23 PROCEDURE — 36415 COLL VENOUS BLD VENIPUNCTURE: CPT

## 2022-06-23 PROCEDURE — 3700000001 HC ADD 15 MINUTES (ANESTHESIA): Performed by: SPECIALIST

## 2022-06-23 PROCEDURE — A4216 STERILE WATER/SALINE, 10 ML: HCPCS | Performed by: INTERNAL MEDICINE

## 2022-06-23 PROCEDURE — 83690 ASSAY OF LIPASE: CPT

## 2022-06-23 PROCEDURE — 85610 PROTHROMBIN TIME: CPT

## 2022-06-23 PROCEDURE — 94761 N-INVAS EAR/PLS OXIMETRY MLT: CPT

## 2022-06-23 PROCEDURE — 6360000002 HC RX W HCPCS: Performed by: INTERNAL MEDICINE

## 2022-06-23 PROCEDURE — 82150 ASSAY OF AMYLASE: CPT

## 2022-06-23 PROCEDURE — 85025 COMPLETE CBC W/AUTO DIFF WBC: CPT

## 2022-06-23 PROCEDURE — 2709999900 HC NON-CHARGEABLE SUPPLY: Performed by: SPECIALIST

## 2022-06-23 PROCEDURE — 0DJD8ZZ INSPECTION OF LOWER INTESTINAL TRACT, VIA NATURAL OR ARTIFICIAL OPENING ENDOSCOPIC: ICD-10-PCS | Performed by: SPECIALIST

## 2022-06-23 PROCEDURE — 3700000000 HC ANESTHESIA ATTENDED CARE: Performed by: SPECIALIST

## 2022-06-23 PROCEDURE — 6370000000 HC RX 637 (ALT 250 FOR IP): Performed by: INTERNAL MEDICINE

## 2022-06-23 PROCEDURE — C9113 INJ PANTOPRAZOLE SODIUM, VIA: HCPCS | Performed by: INTERNAL MEDICINE

## 2022-06-23 PROCEDURE — 80053 COMPREHEN METABOLIC PANEL: CPT

## 2022-06-23 PROCEDURE — 3609027000 HC COLONOSCOPY: Performed by: SPECIALIST

## 2022-06-23 PROCEDURE — 6360000002 HC RX W HCPCS

## 2022-06-23 RX ORDER — SODIUM CHLORIDE, SODIUM LACTATE, POTASSIUM CHLORIDE, CALCIUM CHLORIDE 600; 310; 30; 20 MG/100ML; MG/100ML; MG/100ML; MG/100ML
INJECTION, SOLUTION INTRAVENOUS CONTINUOUS PRN
Status: DISCONTINUED | OUTPATIENT
Start: 2022-06-23 | End: 2022-06-23 | Stop reason: SDUPTHER

## 2022-06-23 RX ORDER — HYDROCORTISONE ACETATE 25 MG/1
25 SUPPOSITORY RECTAL 2 TIMES DAILY
Qty: 28 SUPPOSITORY | Refills: 0 | Status: SHIPPED | OUTPATIENT
Start: 2022-06-23 | End: 2022-07-07

## 2022-06-23 RX ORDER — LIDOCAINE HYDROCHLORIDE 20 MG/ML
INJECTION, SOLUTION EPIDURAL; INFILTRATION; INTRACAUDAL; PERINEURAL PRN
Status: DISCONTINUED | OUTPATIENT
Start: 2022-06-23 | End: 2022-06-23 | Stop reason: SDUPTHER

## 2022-06-23 RX ORDER — PROPOFOL 10 MG/ML
INJECTION, EMULSION INTRAVENOUS PRN
Status: DISCONTINUED | OUTPATIENT
Start: 2022-06-23 | End: 2022-06-23 | Stop reason: SDUPTHER

## 2022-06-23 RX ORDER — HYDROCORTISONE ACETATE 25 MG/1
25 SUPPOSITORY RECTAL 2 TIMES DAILY
Status: DISCONTINUED | OUTPATIENT
Start: 2022-06-23 | End: 2022-06-23 | Stop reason: HOSPADM

## 2022-06-23 RX ADMIN — AMLODIPINE BESYLATE 10 MG: 10 TABLET ORAL at 08:19

## 2022-06-23 RX ADMIN — PROPOFOL 320 MG: 10 INJECTION, EMULSION INTRAVENOUS at 12:12

## 2022-06-23 RX ADMIN — SODIUM CHLORIDE, POTASSIUM CHLORIDE, SODIUM LACTATE AND CALCIUM CHLORIDE: 600; 310; 30; 20 INJECTION, SOLUTION INTRAVENOUS at 12:06

## 2022-06-23 RX ADMIN — LIDOCAINE HYDROCHLORIDE 100 MG: 20 INJECTION, SOLUTION EPIDURAL; INFILTRATION; INTRACAUDAL; PERINEURAL at 12:12

## 2022-06-23 RX ADMIN — SODIUM CHLORIDE 40 MG: 9 INJECTION, SOLUTION INTRAMUSCULAR; INTRAVENOUS; SUBCUTANEOUS at 05:09

## 2022-06-23 RX ADMIN — HYDROCORTISONE ACETATE 25 MG: 25 SUPPOSITORY RECTAL at 15:09

## 2022-06-23 RX ADMIN — SODIUM CHLORIDE, PRESERVATIVE FREE 10 ML: 5 INJECTION INTRAVENOUS at 08:19

## 2022-06-23 ASSESSMENT — PAIN - FUNCTIONAL ASSESSMENT: PAIN_FUNCTIONAL_ASSESSMENT: 0-10

## 2022-06-23 NOTE — DISCHARGE SUMMARY
Addendum 2022: GI bleeding was likely due to diverticulosis coli. Discharge Summary    Name:  gNhia Rodarte /Age/Sex:   (77 y.o. male)   MRN & CSN:  [id number] & [id number] Admission Date/Time: 2022  8:35 PM   Attending:  Priyanka Marshall MD Discharging Physician: Priyanka Marshall MD     Hospital Course:   Nghia Rodarte is a 78 y.o. [place] male with no past medical history who presents with GIB (gastrointestinal bleeding)  This was going on for 1 week. Hemoglobin however remained stable in the 3 days that he was in the hospital.  CT abdomen and pelvis showed mild diverticulosis. Patient underwent endoscopic eval by GI, results as follows: Diverticulosis coli  Patient is now cleared for discharge with outpatient follow-up and repeat colonoscopy in 6 months. Patient was recommended to have Anusol suppositories. During this hospitalization, patient was diagnosed with hypertension and has been started on Lopressor. The patient/family expressed appropriate understanding of and agreement with the discharge recommendations, medications, and plan.      Consults this admission:  IP CONSULT TO HOSPITALIST  IP CONSULT TO GI  IP CONSULT TO GI  IP CONSULT TO SOCIAL WORK    Discharge Instruction:   Follow up appointments: GI  Primary care physician:  within 2 weeks    Diet:  regular diet   Activity: activity as tolerated  Disposition: Discharged to:   [x]Home, [][facility], []SNF, []Acute Rehab, []Hospice   Condition on discharge: Stable    Discharge Medications:        Medication List      START taking these medications    hydrocortisone 25 MG suppository  Commonly known as: ANUSOL-HC  Place 1 suppository rectally 2 times daily for 28 doses        CONTINUE taking these medications    metoprolol tartrate 25 MG tablet  Commonly known as: LOPRESSOR  Take 1 tablet by mouth 2 times daily        STOP taking these medications    tamsulosin 0.4 MG capsule  Commonly known as: FLOMAX           Where to Get Your Medications      These medications were sent to [facility], [address] [place] [address] 25, [address]    Phone: [phone]   · hydrocortisone 25 MG suppository  · metoprolol tartrate 25 MG tablet          Objective Findings at Discharge:   /83   Pulse 60   Temp 97.5 °F (36.4 °C) (Temporal)   Resp 16   Ht 5' 5\" (1.651 m)   Wt 149 lb 6.4 oz (67.8 kg)   SpO2 99%   BMI 24.86 kg/m²            PHYSICAL EXAM  GEN:   Awake male, alert and oriented x 3 in no apparent distress. Appears given age. HEENT: Normal   RESP: Clear lung fields bilaterally. Symmetric chest movement while on RA  CVS: RRR, S1, S2  GI/: Abdomen is soft, nontender, no organomegaly. . Bowel sounds normal, rectal exam deferred. No CVA tenderness. MSK:   No gross joint deformities. No tenderness  SKIN:   Normal coloration, warm, dry. NEURO: Cranial nerves appear grossly intact, normal speech, no lateralizing weakness. PSYCH:  Affect appropriate    BMP/CBC  Recent Labs     06/21/22  0806 06/21/22  0806 06/21/22 2017 06/22/22  1043 06/23/22  1054     --   --  138 137   K 3.7  --   --  4.0 4.4     --   --  103 98*   CO2 27  --   --  25 30   BUN 11  --   --  14 14   CREATININE 1.1  --   --  1.2 1.1   WBC 3.9*  --   --  3.9* 3.9*   HCT 42.3   < > 45.4 45.8 48.3     --   --  242 241    < > = values in this interval not displayed.        IMAGING:  CT abdomen and pelvis    Discharge Time of 22 minutes    Electronically signed by Nat Charles MD on 6/23/2022 at 1:37 PM

## 2022-06-23 NOTE — CARE COORDINATION
Received a call from Elisabeth Clemons in the outpt pharmacy regarding pt's d/c medications. Pt has no insurance and was already on our referral list. We have not helped pt before. Voucher created for Metoprolol Tartrate and Anusol HC and faxed to the pharmacy.  Pt will go on the flag list. If he would need any further assistance, he must fill out an application and provide required documentation to be considered for eligibility./MB

## 2022-06-23 NOTE — PROGRESS NOTES
REPORT CALLED TO MONROE MARQUEZ. PT AWAKE AND RESPONSIVE. VSS. MAY HAVE GENERAL DIET, REPEAT OUTPATIENT IN 6 MONTHS, MAY BE DISCHARGE FROM GI STANDPOINT PER DR AGUILAR.

## 2022-06-23 NOTE — PROGRESS NOTES
Outpatient Pharmacy Progress Note for Meds-to-Beds    Total number of Prescriptions Filled: 2  The following medications were dispensed to the patient during the discharge process:   Hydrocortisone suppositories   Metoprolol tartrate    Additional Documentation:   Medications given to nurse Hyacinth to provide to patient      Thank you for letting us serve your patients.   [address] [place]    [address], [address] [facility]    Phone: [phone]    Fax: [phone]

## 2022-06-23 NOTE — PLAN OF CARE
Problem: Discharge Planning  Goal: Discharge to home or other facility with appropriate resources  6/23/2022 1005 by Darian Vee LPN  Outcome: Progressing  6/22/2022 2314 by Juan A Rutherford RN  Outcome: Progressing     Problem: Safety - Adult  Goal: Free from fall injury  6/23/2022 1005 by Darian Vee LPN  Outcome: Progressing  6/22/2022 2314 by Juan A Rutherford RN  Outcome: Progressing     Problem: ABCDS Injury Assessment  Goal: Absence of physical injury  6/23/2022 1005 by Darian Vee LPN  Outcome: Progressing  6/22/2022 2314 by Juan A Rutherford RN  Outcome: Progressing

## 2022-06-23 NOTE — BRIEF OP NOTE
Brief Postoperative Note      Bary Nissen is a 78 y.o. male     Pre-operative Diagnosis: RECTAL BLEEDING  Post-operative Diagnosis:  D. COLI -HDS--NO BLOOD NOTED--STOOLPRESENT    Procedure:COLONOSCOPY    Anesthesia: MAC    Surgeons/Assistants:Nahun Lovell MD     Estimated Blood Loss: NIL    Complications: None    Specimens: were not obtained  REC--ANUSOL SUPPOSITORIES--F/U COLONOSCOPY 6 MONTHS    Anais Ambrosio MD   6/23/2022   12:57 PM

## 2022-06-24 NOTE — OP NOTE
[address], [address] [place] [address]                                OPERATIVE REPORT    PATIENT NAME: Isabelle Dubon                     :        1955  MED REC NO:   [id number]                          ROOM:       3102  ACCOUNT NO:   [de-identified]                           ADMIT DATE: 2022  PROVIDER:     Veronica Guidry MD    DATE OF PROCEDURE:  2022    PROCEDURE:  Colonoscopy. CHIEF COMPLAINT:  History of hematochezia; rule out lower GI bleeding. PREMEDICATION:  Please refer to the anesthesiologist's notes. PROCEDURE:  The patient was placed in the left lateral decubitus  position and rectal examination was performed. Rectal examination:  Rectal examination revealed evidence of external  hemorrhoids, but no fissures or fistulas were seen and the rectal mucosa  felt was unremarkable. The video Olympus colonoscope was subsequently introduced in the rectum  and was advanced all the way in the cecum. The ileocecal valve and the  appendiceal orifice were identified. A small to moderate amount of  liquid and semisolid stool was noted in different portions of the colon  which was flushed with water and suctioned out. A few diverticula were noted scattered in the colon. No polyps or mass  lesions were seen. No blood recent or old was seen in the lumen of the colon. The colonoscope was retroflexed in the rectum and the anorectal junction  was carefully examined, grade 2 internal hemorrhoids were seen. POSTOPERATIVE DIAGNOSES:  1. Diverticulosis coli. 2.  Hemorrhoids. 3.  Stool present. RECOMMENDATIONS:  1. Hydrocortisone suppository 25 mg b.i.d. for 2 weeks. 2.  Monitor the patient's H and H.  3.  The patient will need a followup colonoscopy in about 6 months' time  to reevaluate the colon in view of presence of stool during today's  exam.    The patient tolerated the procedure well.   There were no postop  complications. The blood loss during the procedure was nil.         Abundio Nunn MD    D: 06/23/2022 13:02:05       T: 06/23/2022 13:04:31     [id number]  Job#: [id number]     Doc#: [id number]    CC:

## 2022-07-11 NOTE — PROGRESS NOTES
Physician Progress Note      Santino Beckham  CSN #:                  [id number]  :                       1955  ADMIT DATE:       2022 8:35 PM  [address] [place] DATE:        2022 3:42 PM  RESPONDING  PROVIDER #:        Christian Ozuna TEXT:    Patient admitted with GI bleeding, noted to have hemorrhoids and   diverticulosis. If possible, please document in progress notes and discharge   summary the cause of the GI bleeding: The medical record reflects the following:  Risk Factors: abd pain, melena  Clinical Indicators: Pt presents with abd pain and bright red melena x 1 week. Colonoscopy found diverticulosis coli, grade 2 internal hemorrhoids. PN dated   22, by Dr. Carmela Mane, \"BRBPR: likely diverticular bleed versus   hemorrhoidal bleed. \"  Treatment: Anusol suppository, Protonix IV, imaging, labs    Thank you,  Queenie Ramon RN  Options provided:  -- GI bleeding possibly due to diverticulosis  -- GI bleeding possibly due to hemorrhoids  -- Other - I will add my own diagnosis  -- Disagree - Not applicable / Not valid  -- Disagree - Clinically unable to determine / Unknown  -- Refer to Clinical Documentation Reviewer    PROVIDER RESPONSE TEXT:    This patient has GI bleeding possibly due to diverticulosis.     Query created by: Rhianna Edwards on 2022 2:36 PM      Electronically signed by:  Jayce Canseco 2022 12:06 PM

## 2022-12-14 ENCOUNTER — APPOINTMENT (OUTPATIENT)
Dept: CT IMAGING | Age: 67
End: 2022-12-14

## 2022-12-14 ENCOUNTER — APPOINTMENT (OUTPATIENT)
Dept: GENERAL RADIOLOGY | Age: 67
End: 2022-12-14

## 2022-12-14 ENCOUNTER — HOSPITAL ENCOUNTER (EMERGENCY)
Age: 67
Discharge: HOME | End: 2022-12-14
Attending: EMERGENCY MEDICINE

## 2022-12-14 VITALS
SYSTOLIC BLOOD PRESSURE: 118 MMHG | RESPIRATION RATE: 23 BRPM | DIASTOLIC BLOOD PRESSURE: 76 MMHG | OXYGEN SATURATION: 99 % | HEART RATE: 78 BPM | TEMPERATURE: 98.2 F

## 2022-12-14 DIAGNOSIS — R33.8 ACUTE URINARY RETENTION: Primary | ICD-10-CM

## 2022-12-14 DIAGNOSIS — N39.0 ACUTE UTI: ICD-10-CM

## 2022-12-14 LAB
ABO/RH: NORMAL
ALBUMIN SERPL-MCNC: 4.3 GM/DL (ref 3.4–5)
ALP BLD-CCNC: 83 IU/L (ref 40–129)
ALT SERPL-CCNC: 21 U/L (ref 10–40)
ANION GAP SERPL CALCULATED.3IONS-SCNC: 11 MMOL/L (ref 4–16)
ANTIBODY SCREEN: NEGATIVE
AST SERPL-CCNC: 30 IU/L (ref 15–37)
BACTERIA: ABNORMAL /HPF
BASOPHILS ABSOLUTE: 0 K/CU MM
BASOPHILS RELATIVE PERCENT: 0.5 % (ref 0–1)
BILIRUB SERPL-MCNC: 0.3 MG/DL (ref 0–1)
BILIRUBIN URINE: NEGATIVE MG/DL
BLOOD, URINE: ABNORMAL
BUN BLDV-MCNC: 21 MG/DL (ref 6–23)
CALCIUM SERPL-MCNC: 10.5 MG/DL (ref 8.3–10.6)
CHLORIDE BLD-SCNC: 100 MMOL/L (ref 99–110)
CLARITY: ABNORMAL
CO2: 22 MMOL/L (ref 21–32)
COLOR: YELLOW
CREAT SERPL-MCNC: 1.1 MG/DL (ref 0.9–1.3)
DIFFERENTIAL TYPE: ABNORMAL
EOSINOPHILS ABSOLUTE: 0 K/CU MM
EOSINOPHILS RELATIVE PERCENT: 0 % (ref 0–3)
GFR SERPL CREATININE-BSD FRML MDRD: >60 ML/MIN/1.73M2
GLUCOSE BLD-MCNC: 127 MG/DL (ref 70–99)
GLUCOSE, URINE: NEGATIVE MG/DL
HCT VFR BLD CALC: 45.6 % (ref 42–52)
HEMOGLOBIN: 14.5 GM/DL (ref 13.5–18)
IMMATURE NEUTROPHIL %: 0.3 % (ref 0–0.43)
KETONES, URINE: NEGATIVE MG/DL
LEUKOCYTE ESTERASE, URINE: ABNORMAL
LYMPHOCYTES ABSOLUTE: 0.9 K/CU MM
LYMPHOCYTES RELATIVE PERCENT: 22.7 % (ref 24–44)
MCH RBC QN AUTO: 28 PG (ref 27–31)
MCHC RBC AUTO-ENTMCNC: 31.8 % (ref 32–36)
MCV RBC AUTO: 88 FL (ref 78–100)
MONOCYTES ABSOLUTE: 0.3 K/CU MM
MONOCYTES RELATIVE PERCENT: 6.5 % (ref 0–4)
NITRITE URINE, QUANTITATIVE: NEGATIVE
NUCLEATED RBC %: 0 %
PDW BLD-RTO: 13.2 % (ref 11.7–14.9)
PH, URINE: 6.5 (ref 5–8)
PLATELET # BLD: 203 K/CU MM (ref 140–440)
PMV BLD AUTO: 9.4 FL (ref 7.5–11.1)
POTASSIUM SERPL-SCNC: 3.9 MMOL/L (ref 3.5–5.1)
PRO-BNP: 938 PG/ML
PROTEIN UA: ABNORMAL MG/DL
RBC # BLD: 5.18 M/CU MM (ref 4.6–6.2)
RBC URINE: 537 /HPF (ref 0–3)
SEGMENTED NEUTROPHILS ABSOLUTE COUNT: 2.7 K/CU MM
SEGMENTED NEUTROPHILS RELATIVE PERCENT: 70 % (ref 36–66)
SODIUM BLD-SCNC: 133 MMOL/L (ref 135–145)
SPECIFIC GRAVITY UA: 1.01 (ref 1–1.03)
SPERM: ABNORMAL /HFP
SQUAMOUS EPITHELIAL: 2 /HPF
TOTAL IMMATURE NEUTOROPHIL: 0.01 K/CU MM
TOTAL NUCLEATED RBC: 0 K/CU MM
TOTAL PROTEIN: 8 GM/DL (ref 6.4–8.2)
TRICHOMONAS: ABNORMAL /HPF
TROPONIN T: <0.01 NG/ML
UROBILINOGEN, URINE: 0.2 MG/DL (ref 0.2–1)
WBC # BLD: 3.8 K/CU MM (ref 4–10.5)
WBC UA: 6 /HPF (ref 0–2)

## 2022-12-14 PROCEDURE — 86900 BLOOD TYPING SEROLOGIC ABO: CPT

## 2022-12-14 PROCEDURE — 86850 RBC ANTIBODY SCREEN: CPT

## 2022-12-14 PROCEDURE — 93005 ELECTROCARDIOGRAM TRACING: CPT | Performed by: EMERGENCY MEDICINE

## 2022-12-14 PROCEDURE — 80053 COMPREHEN METABOLIC PANEL: CPT

## 2022-12-14 PROCEDURE — 87186 SC STD MICRODIL/AGAR DIL: CPT

## 2022-12-14 PROCEDURE — 83880 ASSAY OF NATRIURETIC PEPTIDE: CPT

## 2022-12-14 PROCEDURE — 85025 COMPLETE CBC W/AUTO DIFF WBC: CPT

## 2022-12-14 PROCEDURE — 86901 BLOOD TYPING SEROLOGIC RH(D): CPT

## 2022-12-14 PROCEDURE — 87086 URINE CULTURE/COLONY COUNT: CPT

## 2022-12-14 PROCEDURE — 81003 URINALYSIS AUTO W/O SCOPE: CPT

## 2022-12-14 PROCEDURE — 87088 URINE BACTERIA CULTURE: CPT

## 2022-12-14 PROCEDURE — 84484 ASSAY OF TROPONIN QUANT: CPT

## 2022-12-14 PROCEDURE — 83605 ASSAY OF LACTIC ACID: CPT

## 2022-12-14 PROCEDURE — 74176 CT ABD & PELVIS W/O CONTRAST: CPT

## 2022-12-14 PROCEDURE — 71045 X-RAY EXAM CHEST 1 VIEW: CPT

## 2022-12-14 PROCEDURE — 99285 EMERGENCY DEPT VISIT HI MDM: CPT

## 2022-12-14 ASSESSMENT — PAIN DESCRIPTION - DESCRIPTORS: DESCRIPTORS: DISCOMFORT;BURNING

## 2022-12-14 ASSESSMENT — PAIN DESCRIPTION - LOCATION: LOCATION: PENIS

## 2022-12-14 ASSESSMENT — PAIN SCALES - GENERAL: PAINLEVEL_OUTOF10: 10

## 2022-12-14 NOTE — ED PROVIDER NOTES
Emergency Department Encounter  Location: [address] [place] [address] EMERGENCY DEPARTMENT    Patient: Ardyce Hodgkins  MRN: [id number]  : 1955  Date of evaluation: 2022  ED Provider: DO Honorio Alvarenga was checked out to me by Dr. Almaz Montoya. Please see his/her initial documentation for details of the patient's initial ED presentation, physical exam and completed studies. In brief, Ardyce Hodgkins is a 75 y.o. male that presented to the emergency department with concern for urinary retention. Following Valenzuela catheter placement, patient with over 1 L of urine output. Labs have been reassuring. Imaging without acute process. Incidental abdominal aortic aneurysm noted. Patient is signed out to me pending UA. I have reviewed and interpreted all of the currently available lab results and diagnostics from this visit:  Results for orders placed or performed during the hospital encounter of 22   Culture, Urine    Specimen: Urine, clean catch   Result Value Ref Range    Specimen URINE CLEAN CATCH     Special Requests NONE     Culture Final Report     Culture ESCHERICHIA COLI >100,000 CFU/ml (A)        Susceptibility    Escherichia coli - BACTERIAL SUSCEPTIBILITY PANEL JULIAN     ampicillin >=32 Resistant      ampicillin-sulbactam >=32 Resistant      ceFAZolin 8 Sensitive      ceFAZolin Value in next row        (NOTE)NOTE: Cefazolin should only be used for uncomplicated UTI     for E.coli or Klebsiella pneumoniae. cefepime Value in next row Sensitive       (NOTE)NOTE: Cefazolin should only be used for uncomplicated UTI     for E.coli or Klebsiella pneumoniae.      ciprofloxacin Value in next row Resistant       (NOTE)NOTE: Cefazolin should only be used for uncomplicated UTI     for E.coli or Klebsiella pneumoniae.     ertapenem Value in next row Sensitive       (NOTE)NOTE: Cefazolin should only be used for uncomplicated UTI     for E.coli or Klebsiella pneumoniae. gentamicin Value in next row Sensitive       (NOTE)NOTE: Cefazolin should only be used for uncomplicated UTI     for E.coli or Klebsiella pneumoniae. levofloxacin Value in next row Resistant       (NOTE)NOTE: Cefazolin should only be used for uncomplicated UTI     for E.coli or Klebsiella pneumoniae. piperacillin-tazobactam Value in next row Resistant       (NOTE)NOTE: Cefazolin should only be used for uncomplicated UTI     for E.coli or Klebsiella pneumoniae. trimethoprim-sulfamethoxazole Value in next row Sensitive       (NOTE)NOTE: Cefazolin should only be used for uncomplicated UTI     for E.coli or Klebsiella pneumoniae. nitrofurantoin Value in next row Sensitive       (NOTE)NOTE: Cefazolin should only be used for uncomplicated UTI     for E.coli or Klebsiella pneumoniae.    CBC with Auto Differential   Result Value Ref Range    WBC 3.8 (L) 4.0 - 10.5 K/CU MM    RBC 5.18 4.6 - 6.2 M/CU MM    Hemoglobin 14.5 13.5 - 18.0 GM/DL    Hematocrit 45.6 42 - 52 %    MCV 88.0 78 - 100 FL    MCH 28.0 27 - 31 PG    MCHC 31.8 (L) 32.0 - 36.0 %    RDW 13.2 11.7 - 14.9 %    Platelets 146 347 - 780 K/CU MM    MPV 9.4 7.5 - 11.1 FL    Differential Type AUTOMATED DIFFERENTIAL     Segs Relative 70.0 (H) 36 - 66 %    Lymphocytes % 22.7 (L) 24 - 44 %    Monocytes % 6.5 (H) 0 - 4 %    Eosinophils % 0.0 0 - 3 %    Basophils % 0.5 0 - 1 %    Segs Absolute 2.7 K/CU MM    Lymphocytes Absolute 0.9 K/CU MM    Monocytes Absolute 0.3 K/CU MM    Eosinophils Absolute 0.0 K/CU MM    Basophils Absolute 0.0 K/CU MM    Nucleated RBC % 0.0 %    Total Nucleated RBC 0.0 K/CU MM    Total Immature Neutrophil 0.01 K/CU MM    Immature Neutrophil % 0.3 0 - 0.43 %   Comprehensive Metabolic Panel   Result Value Ref Range    Sodium 133 (L) 135 - 145 MMOL/L    Potassium 3.9 3.5 - 5.1 MMOL/L    Chloride 100 99 - 110 mMol/L    CO2 22 21 - 32 MMOL/L    BUN 21 6 - 23 MG/DL    Creatinine 1.1 0.9 - 1.3 MG/DL    Est, Glom Filt Rate >60 >60 mL/min/1.73m2    Glucose 127 (H) 70 - 99 MG/DL    Calcium 10.5 8.3 - 10.6 MG/DL    Albumin 4.3 3.4 - 5.0 GM/DL    Total Protein 8.0 6.4 - 8.2 GM/DL    Total Bilirubin 0.3 0.0 - 1.0 MG/DL    ALT 21 10 - 40 U/L    AST 30 15 - 37 IU/L    Alkaline Phosphatase 83 40 - 129 IU/L    Anion Gap 11 4 - 16   Troponin   Result Value Ref Range    Troponin T <0.010 <0.01 NG/ML   Brain Natriuretic Peptide   Result Value Ref Range    Pro-.0 (H) <300 PG/ML   Urinalysis with Reflex to Culture    Specimen: Urine   Result Value Ref Range    Color, UA YELLOW YELLOW    Clarity, UA CLOUDY (A) CLEAR    Glucose, Urine NEGATIVE NEGATIVE MG/DL    Bilirubin Urine NEGATIVE NEGATIVE MG/DL    Ketones, Urine NEGATIVE NEGATIVE MG/DL    Specific Gravity, UA 1.015 1.001 - 1.035    Blood, Urine LARGE NUMBER OR AMOUNT OBSERVED (A) NEGATIVE    pH, Urine 6.5 5.0 - 8.0    Protein, UA TRACE (A) NEGATIVE MG/DL    Urobilinogen, Urine 0.2 0.2 - 1.0 MG/DL    Nitrite Urine, Quantitative NEGATIVE NEGATIVE    Leukocyte Esterase, Urine TRACE (A) NEGATIVE    RBC,  (H) 0 - 3 /HPF    WBC, UA 6 (H) 0 - 2 /HPF    Bacteria, UA RARE (A) NEGATIVE /HPF    Squam Epithel, UA 2 /HPF    Sperm, UA RARE /HFP    Trichomonas, UA NONE SEEN NONE SEEN /HPF   EKG 12 Lead   Result Value Ref Range    Ventricular Rate 82 BPM    Atrial Rate 129 BPM    QRS Duration 110 ms    Q-T Interval 364 ms    QTc Calculation (Bazett) 425 ms    R Axis -28 degrees    T Axis 162 degrees    Diagnosis       Atrial fibrillation  Voltage criteria for left ventricular hypertrophy  Lateral infarct , age undetermined  Abnormal ECG  No previous ECGs available  Confirmed by Linda Enriquez ([id number]) on 12/16/2022 9:17:30 PM     TYPE AND SCREEN   Result Value Ref Range    ABO/Rh O POSITIVE     Antibody Screen NEGATIVE      No results found. Final ED Course and MDM:  Patient's urinalysis is is without convincing evidence of infection although there is a large amount of blood noted.   Is sent for culture as a precaution. Otherwise,no renal dysfunction or significant leukocytosis. Patient remains comfortable and well-appearing. Case management has been consulted to provide resources for the patient. He is also placed on the ED surge list so that we can ensure close follow-up. We will plan for outpatient management as discussed with Dr. Pablo Harry. Patient is instructed to follow-up with urology in the next 1 to 2 days. Is also given referral to cardiology for outpatient monitoring of AAA. Patient is given instructions regarding symptomatic care at home as well as return precautions. Patient verbalizes understanding of all instructions and is comfortable with the plan of care. ED Medication Orders (From admission, onward)      None            Final Impression      1. Acute urinary retention    2. Acute UTI        DISPOSITION Decision To Discharge 12/14/2022 07:37:44 PM     (Please note that portions of this note may have been completed with a voice recognition program. Efforts were made to edit the dictations but occasionally words are mis-transcribed.)    Junior Yeung, DO   [employer]    0800 12/21/22  On completion of note, patient's urine culture is found to be positive. Have attempted to call patient. Left voicemail. Results also communicated to ED pharmacist for further follow-up.        Junior Gamal DO  12/21/22 5254

## 2022-12-14 NOTE — CARE COORDINATION
CM consulted for resources for PCP. CM gave PCP resources, J[employer] info, [employer] program and [employer] resources. Pt states he was supposed to be returning home but its not looking good there right now. Pt states he is staying with a friend who is helping him. CM encouraged pt to visit [facility] for help and JFS also. CM used  [facility] [id number].      Spoke with Dr. Corina Sterling. She requested pt be placed on surge list. Cm emailed info to Nikia to add pt to call list.

## 2022-12-14 NOTE — ED PROVIDER NOTES
Emergency Department Encounter    Patient: Wilber Hamilton  MRN: [id number]  : 1955  Date of Evaluation: 2022  ED Provider:  Mathew Pyle MD    Triage Chief Complaint:   Urinary Retention    [place]:  Wilber Hamilton is a 75 y.o. male that presents with concern for urinary retention, is not been able to pee for several hours, not urinating well for several days. [facility]  used, Bonnye Odor #[id number]. He is having significant abdominal pain, where he feels the urge to urinate but not able to do so. It radiates to his back. No chest pain. No shortness of breath. He does have significantly elevated blood pressure. He has not been on his blood pressure medications for several days. He does not currently have a PCP. He had an admission in  of this year, discharge summary reviewed, Dr. Krishan Arenas. Had a GI bleed as well as diagnosed with hypertension at that time. No vomiting. No diarrhea. ROS - see HPI, below listed is current ROS at time of my eval:  10 systems reviewed negative except as above    Past Medical History:   Diagnosis Date    Hypertension      Past Surgical History:   Procedure Laterality Date    ABDOMEN SURGERY      COLONOSCOPY N/A 2022    COLONOSCOPY DIAGNOSTIC performed by José Miguel Storey MD at [place] [address] reviewed. No pertinent family history.   Social History     Socioeconomic History    Marital status: Legally      Spouse name: Not on file    Number of children: Not on file    Years of education: Not on file    Highest education level: Not on file   Occupational History    Not on file   Tobacco Use    Smoking status: Never    Smokeless tobacco: Never   Substance and Sexual Activity    Alcohol use: Not Currently    Drug use: Never    Sexual activity: Not on file   Other Topics Concern    Not on file   Social History Narrative    Not on file     Social Determinants of Health     Financial Resource Strain: Not on file   Food Insecurity: Not on file   Transportation Needs: Not on file   Physical Activity: Not on file   Stress: Not on file   Social Connections: Not on file   Intimate Partner Violence: Not on file   Housing Stability: Not on file     No current facility-administered medications for this encounter. Current Outpatient Medications   Medication Sig Dispense Refill    metoprolol tartrate (LOPRESSOR) 25 MG tablet Take 1 tablet by mouth 2 times daily 60 tablet 2     No Known Allergies    Nursing Notes Reviewed    Physical Exam:  Triage VS:    ED Triage Vitals [12/14/22 1310]   Enc Vitals Group      BP (!) 223/109      Heart Rate (!) 140      Resp 16      Temp 98.2 °F (36.8 °C)      Temp src       SpO2 100 %      Weight       Height       Head Circumference       Peak Flow       Pain Score       Pain Loc       Pain Edu? Excl. in [address]? My pulse ox interpretation is - normal    General appearance:  rolling around on the bed, very uncomfortable appearing  Skin:  Warm. Dry. Eye:  Extraocular movements intact. Ears, nose, mouth and throat:  Oral mucosa moist   Neck:  Trachea midline. Extremity:  No swelling. Normal ROM     Heart:  Regular rate and rhythm, normal S1 & S2, no extra heart sounds. Perfusion:  intact  Respiratory:  Lungs clear to auscultation bilaterally. Respirations nonlabored. Abdominal:  distended.    Neurological:  Alert and oriented          Psychiatric:  Appropriate    I have reviewed and interpreted all of the currently available lab results from this visit (if applicable):  Results for orders placed or performed during the hospital encounter of 12/14/22   CBC with Auto Differential   Result Value Ref Range    WBC 3.8 (L) 4.0 - 10.5 K/CU MM    RBC 5.18 4.6 - 6.2 M/CU MM    Hemoglobin 14.5 13.5 - 18.0 GM/DL    Hematocrit 45.6 42 - 52 %    MCV 88.0 78 - 100 FL    MCH 28.0 27 - 31 PG    MCHC 31.8 (L) 32.0 - 36.0 %    RDW 13.2 11.7 - 14.9 %    Platelets 129 717 - 581 K/CU MM    MPV 9.4 7.5 - 11.1 FL Differential Type AUTOMATED DIFFERENTIAL     Segs Relative 70.0 (H) 36 - 66 %    Lymphocytes % 22.7 (L) 24 - 44 %    Monocytes % 6.5 (H) 0 - 4 %    Eosinophils % 0.0 0 - 3 %    Basophils % 0.5 0 - 1 %    Segs Absolute 2.7 K/CU MM    Lymphocytes Absolute 0.9 K/CU MM    Monocytes Absolute 0.3 K/CU MM    Eosinophils Absolute 0.0 K/CU MM    Basophils Absolute 0.0 K/CU MM    Nucleated RBC % 0.0 %    Total Nucleated RBC 0.0 K/CU MM    Total Immature Neutrophil 0.01 K/CU MM    Immature Neutrophil % 0.3 0 - 0.43 %   Comprehensive Metabolic Panel   Result Value Ref Range    Sodium 133 (L) 135 - 145 MMOL/L    Potassium 3.9 3.5 - 5.1 MMOL/L    Chloride 100 99 - 110 mMol/L    CO2 22 21 - 32 MMOL/L    BUN 21 6 - 23 MG/DL    Creatinine 1.1 0.9 - 1.3 MG/DL    Est, Glom Filt Rate >60 >60 mL/min/1.73m2    Glucose 127 (H) 70 - 99 MG/DL    Calcium 10.5 8.3 - 10.6 MG/DL    Albumin 4.3 3.4 - 5.0 GM/DL    Total Protein 8.0 6.4 - 8.2 GM/DL    Total Bilirubin 0.3 0.0 - 1.0 MG/DL    ALT 21 10 - 40 U/L    AST 30 15 - 37 IU/L    Alkaline Phosphatase 83 40 - 129 IU/L    Anion Gap 11 4 - 16   Troponin   Result Value Ref Range    Troponin T <0.010 <0.01 NG/ML   Brain Natriuretic Peptide   Result Value Ref Range    Pro-.0 (H) <300 PG/ML   EKG 12 Lead   Result Value Ref Range    Ventricular Rate 82 BPM    Atrial Rate 129 BPM    QRS Duration 110 ms    Q-T Interval 364 ms    QTc Calculation (Bazett) 425 ms    R Axis -28 degrees    T Axis 162 degrees    Diagnosis       Atrial fibrillation  Voltage criteria for left ventricular hypertrophy  Lateral infarct , age undetermined  Abnormal ECG  No previous ECGs available     TYPE AND SCREEN   Result Value Ref Range    ABO/Rh O POSITIVE     Antibody Screen NEGATIVE       Radiographs (if obtained):  Radiologist's Report Reviewed:  No results found. EKG (if obtained): (All EKG's are interpreted by myself in the absence of a cardiologist)  Atrial fibrillation, rate of 82 bpm.  No ST elevation.   Left ventricular hypertrophy noted. No previous to compare      MDM:  58-year-old male with history as above presents with concern for urinary retention, he is extremely hypertensive, tachycardic. Obtaining labs, working up for possible renal failure, we will place a Valenzuela and then reassess. With Valenzuela he is having significant improvement of symptoms. His heart rate is improved, his blood pressure is significantly improved. He had been out of his lisinopril so 136 systolic I suspect is currently baseline. He is in much less pain. We are awaiting labs      Kidney function appears to be normal, sodium and potassium are normal.  He is not in distress and hemoglobin is stable. CT does not show any evidence of renal problems. He does have an abdominal aortic aneurysm that we will refer. He does appear to have some questionable pneumonitis versus fluid, however could be chronic lung scarring, and he is not currently having any respiratory symptoms. His BNP and troponin are normal.  Awaiting urinalysis but plan will be for likely discharge home with referral for the AAA and to urology for retention. Signed out to Dr. Dante Penn to f/u on urinalysis. Clinical Impression:  No diagnosis found. Disposition referral (if applicable):  No follow-up provider specified. Disposition medications (if applicable):  New Prescriptions    No medications on file     ED Provider Disposition Time  DISPOSITION        Comment: Please note this report has been produced using speech recognition software and may contain errors related to that system including errors in grammar, punctuation, and spelling, as well as words and phrases that may be inappropriate. Efforts were made to edit the dictations.        Omero Beavers MD  12/14/22 3177

## 2022-12-16 LAB
EKG ATRIAL RATE: 129 BPM
EKG DIAGNOSIS: NORMAL
EKG Q-T INTERVAL: 364 MS
EKG QRS DURATION: 110 MS
EKG QTC CALCULATION (BAZETT): 425 MS
EKG R AXIS: -28 DEGREES
EKG T AXIS: 162 DEGREES
EKG VENTRICULAR RATE: 82 BPM

## 2022-12-16 PROCEDURE — 93010 ELECTROCARDIOGRAM REPORT: CPT | Performed by: INTERNAL MEDICINE

## 2022-12-17 LAB
CULTURE: ABNORMAL
CULTURE: ABNORMAL
Lab: ABNORMAL
SPECIMEN: ABNORMAL

## 2022-12-22 ENCOUNTER — TELEPHONE (OUTPATIENT)
Dept: PHARMACY | Age: 67
End: 2022-12-22

## 2022-12-22 NOTE — PROGRESS NOTES
Pharmacy Note  ED Culture Follow-up    Ralf Farrell is a 75 y.o. male. Allergies: Patient has no known allergies. Labs:  Lab Results   Component Value Date    BUN 21 12/14/2022    CREATININE 1.1 12/14/2022    WBC 3.8 (L) 12/14/2022     CrCl cannot be calculated (Unknown ideal weight.). Current antimicrobials:   None    ASSESSMENT:  Micro results:   Urine culture: positive for E. Coli >100,000 CFU/ml     PLAN:  Need for intervention: Yes  Discussed with: Dr. Felicity Celeste treatment:    Unable to contact patient to initiate Bactrim -160 mg by mouth twice daily for 7 days. Patient response:   Call attempt #2, did not reach patient. Unable to reach patient after 2 call attempts, sent letter on 12/22/22    Called/sent in prescription to: Not applicable    Please call with any questions.  DAPHNEY Hillman [facility], PharmD 2:01 PM 12/22/2022

## 2022-12-22 NOTE — TELEPHONE ENCOUNTER
Pharmacy Note  ED Culture Follow-up    Bethany Leblanc is a 75 y.o. male. Allergies: Patient has no known allergies. Labs:  Lab Results   Component Value Date    BUN 21 12/14/2022    CREATININE 1.1 12/14/2022    WBC 3.8 (L) 12/14/2022     CrCl cannot be calculated (Unknown ideal weight.). Current antimicrobials:   None    ASSESSMENT:  Micro results:   Urine culture: positive for E. Coli >100,000 CFU/ml     PLAN:  Need for intervention: Yes  Discussed with: Dr. Gregory Phillips treatment:    Unable to contact patient to initiate Bactrim -160 mg by mouth twice daily for 7 days. Patient response:   Call attempt #2, did not reach patient. Unable to reach patient after 2 call attempts, sent letter on 12/22/22    Called/sent in prescription to: Not applicable    Please call with any questions.  Wil Wong, [address] [place] [address], PharmD 2:01 PM 12/22/2022

## 2023-02-20 ENCOUNTER — HOSPITAL ENCOUNTER (EMERGENCY)
Age: 68
Discharge: HOME | End: 2023-02-21

## 2023-02-20 DIAGNOSIS — R33.9 URINARY RETENTION: Primary | ICD-10-CM

## 2023-02-20 PROCEDURE — 99283 EMERGENCY DEPT VISIT LOW MDM: CPT

## 2023-02-20 PROCEDURE — 51702 INSERT TEMP BLADDER CATH: CPT

## 2023-02-21 VITALS
TEMPERATURE: 97.8 F | DIASTOLIC BLOOD PRESSURE: 102 MMHG | OXYGEN SATURATION: 100 % | HEART RATE: 95 BPM | SYSTOLIC BLOOD PRESSURE: 179 MMHG | RESPIRATION RATE: 22 BRPM

## 2023-02-21 LAB
BACTERIA: NEGATIVE /HPF
BILIRUBIN URINE: NEGATIVE MG/DL
BLOOD, URINE: ABNORMAL
CLARITY: CLEAR
COLOR: YELLOW
GLUCOSE, URINE: NEGATIVE MG/DL
KETONES, URINE: NEGATIVE MG/DL
LEUKOCYTE ESTERASE, URINE: ABNORMAL
MUCUS: ABNORMAL HPF
NITRITE URINE, QUANTITATIVE: NEGATIVE
PH, URINE: 7.5 (ref 5–8)
PROTEIN UA: NEGATIVE MG/DL
RBC URINE: 443 /HPF (ref 0–3)
SPECIFIC GRAVITY UA: 1.01 (ref 1–1.03)
SQUAMOUS EPITHELIAL: 5 /HPF
TRICHOMONAS: ABNORMAL /HPF
UNCLASSIFIED CRYSTAL: ABNORMAL /HPF
UROBILINOGEN, URINE: 0.2 MG/DL (ref 0.2–1)
WBC UA: 17 /HPF (ref 0–2)

## 2023-02-21 PROCEDURE — 81001 URINALYSIS AUTO W/SCOPE: CPT

## 2023-02-21 PROCEDURE — 87186 SC STD MICRODIL/AGAR DIL: CPT

## 2023-02-21 PROCEDURE — 87086 URINE CULTURE/COLONY COUNT: CPT

## 2023-02-21 PROCEDURE — 87077 CULTURE AEROBIC IDENTIFY: CPT

## 2023-02-21 NOTE — ED TRIAGE NOTES
Had a byrne catheter from Jan 30th that was removed today at 1000.  Has not been able to urinate since.

## 2023-02-23 LAB
CULTURE: ABNORMAL
CULTURE: ABNORMAL
Lab: ABNORMAL
SPECIMEN: ABNORMAL

## 2023-02-28 ENCOUNTER — TELEPHONE (OUTPATIENT)
Dept: PHARMACY | Age: 68
End: 2023-02-28

## 2023-02-28 NOTE — TELEPHONE ENCOUNTER
Pharmacy Note  ED Culture Follow-up    Delfino Taylor is a 75 y.o. male. Allergies: Patient has no known allergies. Labs:  Lab Results   Component Value Date    BUN 21 12/14/2022    CREATININE 1.1 12/14/2022    WBC 3.8 (L) 12/14/2022     CrCl cannot be calculated (Patient's most recent lab result is older than the maximum 30 days allowed. ). Current antimicrobials:   None    ASSESSMENT:  Micro results:   Urine culture: positive for citrobacter koseri >100,000 CFU/ml     PLAN:  Need for intervention: Yes  Discussed with: Dr. Nevaeh Andino treatment:    Unable to contact patient to inform of result and initiate cephalexin. Patient response:   Call attempt #3, did not reach patient. Unable to reach patient after 3 call attempts, sent letter on 2/28/23    Called/sent in prescription to: Not applicable    Please call with any questions.  Dejon Ochoa, [address] [facility], PharmD 4:30 PM 2/28/2023

## 2024-10-08 ENCOUNTER — HOSPITAL ENCOUNTER (EMERGENCY)
Age: 69
Discharge: HOME | End: 2024-10-08
Payer: COMMERCIAL

## 2024-10-08 VITALS
DIASTOLIC BLOOD PRESSURE: 107 MMHG | TEMPERATURE: 98.4 F | RESPIRATION RATE: 16 BRPM | HEART RATE: 62 BPM | SYSTOLIC BLOOD PRESSURE: 202 MMHG | OXYGEN SATURATION: 99 %

## 2024-10-08 DIAGNOSIS — Z46.6 ENCOUNTER FOR FOLEY CATHETER REPLACEMENT: Primary | ICD-10-CM

## 2024-10-08 PROCEDURE — 99283 EMERGENCY DEPT VISIT LOW MDM: CPT

## 2024-10-08 PROCEDURE — 51702 INSERT TEMP BLADDER CATH: CPT

## 2024-10-08 ASSESSMENT — PAIN SCALES - GENERAL
PAINLEVEL_OUTOF10: 0

## 2024-10-08 ASSESSMENT — PAIN - FUNCTIONAL ASSESSMENT
PAIN_FUNCTIONAL_ASSESSMENT: 0-10
PAIN_FUNCTIONAL_ASSESSMENT: NONE - DENIES PAIN

## 2024-10-08 NOTE — ED PROVIDER NOTES
DISPOSITION/PLAN   DISPOSITION Discharge - Pending Orders Complete 10/08/2024 06:41:23 PM    PATIENT REFERREDTO:  Urology  Recommended by your insurance company          DISCHARGE MEDICATIONS:  New Prescriptions    No medications on file       DISCONTINUED MEDICATIONS:  Discontinued Medications    No medications on file              (Please note that portions ofthis note were completed with a voice recognition program.  Efforts were made to edit the dictations but occasionally words are mis-transcribed.)    Desi Anguiano PA-C (electronically signed)            Desi Anguiano PA-C  10/08/24 [id number]

## 2024-10-08 NOTE — ED NOTES
Verified by assessment pt has 16fr, 10ml balloon indwelling catheter that needs exchanged. Note that urine has very foul smell

## 2024-10-09 NOTE — ED NOTES
Valenzuela catheter placed at this time. Pt is hypertensive and Desi STARR aware. Pt given PCP follow up instructions to resume his blood pressure medications.  used. All questions answered. Pt a/o and d/c at this time

## 2024-10-13 ENCOUNTER — APPOINTMENT (OUTPATIENT)
Dept: CT IMAGING | Age: 69
DRG: 699 | End: 2024-10-13
Payer: COMMERCIAL

## 2024-10-13 ENCOUNTER — HOSPITAL ENCOUNTER (INPATIENT)
Age: 69
LOS: 2 days | Discharge: HOME | DRG: 699 | End: 2024-10-15
Attending: EMERGENCY MEDICINE | Admitting: INTERNAL MEDICINE
Payer: COMMERCIAL

## 2024-10-13 DIAGNOSIS — R31.9 HEMATURIA, UNSPECIFIED TYPE: ICD-10-CM

## 2024-10-13 DIAGNOSIS — N40.0 ENLARGED PROSTATE: ICD-10-CM

## 2024-10-13 DIAGNOSIS — Z97.8 CHRONIC INDWELLING FOLEY CATHETER: ICD-10-CM

## 2024-10-13 DIAGNOSIS — I16.0 HYPERTENSIVE URGENCY: Primary | ICD-10-CM

## 2024-10-13 DIAGNOSIS — I71.43 INFRARENAL ABDOMINAL AORTIC ANEURYSM (AAA) WITHOUT RUPTURE (HCC): ICD-10-CM

## 2024-10-13 PROBLEM — R31.0 GROSS HEMATURIA: Status: ACTIVE | Noted: 2024-10-13

## 2024-10-13 LAB
ALBUMIN SERPL-MCNC: 4.2 G/DL (ref 3.4–5)
ALBUMIN/GLOB SERPL: 1.2 {RATIO} (ref 1.1–2.2)
ALP SERPL-CCNC: 67 U/L (ref 40–129)
ALT SERPL-CCNC: 12 U/L (ref 10–40)
ANION GAP SERPL CALCULATED.3IONS-SCNC: 10 MMOL/L (ref 9–17)
AST SERPL-CCNC: 31 U/L (ref 15–37)
BACTERIA URNS QL MICRO: ABNORMAL
BASOPHILS # BLD: 0.01 K/UL
BASOPHILS NFR BLD: 0 % (ref 0–1)
BILIRUB SERPL-MCNC: 0.6 MG/DL (ref 0–1)
BILIRUB UR QL STRIP: ABNORMAL
BUN SERPL-MCNC: 10 MG/DL (ref 7–20)
CALCIUM SERPL-MCNC: 9.4 MG/DL (ref 8.3–10.6)
CHLORIDE SERPL-SCNC: 104 MMOL/L (ref 99–110)
CLARITY UR: CLEAR
CO2 SERPL-SCNC: 27 MMOL/L (ref 21–32)
COLOR UR: ABNORMAL
CREAT SERPL-MCNC: 1.1 MG/DL (ref 0.8–1.3)
EOSINOPHIL # BLD: 0.04 K/UL
EOSINOPHILS RELATIVE PERCENT: 1 % (ref 0–3)
EPI CELLS #/AREA URNS HPF: 1 /HPF
ERYTHROCYTE [DISTWIDTH] IN BLOOD BY AUTOMATED COUNT: 13.7 % (ref 11.7–14.9)
GFR, ESTIMATED: 64 ML/MIN/1.73M2
GLUCOSE SERPL-MCNC: 94 MG/DL (ref 74–99)
GLUCOSE UR STRIP-MCNC: ABNORMAL MG/DL
HCT VFR BLD AUTO: 45.4 % (ref 42–52)
HGB BLD-MCNC: 14.4 G/DL (ref 13.5–18)
HGB UR QL STRIP.AUTO: ABNORMAL
IMM GRANULOCYTES # BLD AUTO: 0 K/UL
IMM GRANULOCYTES NFR BLD: 0 %
KETONES UR STRIP-MCNC: ABNORMAL MG/DL
LEUKOCYTE ESTERASE UR QL STRIP: ABNORMAL
LYMPHOCYTES NFR BLD: 1.45 K/UL
LYMPHOCYTES RELATIVE PERCENT: 38 % (ref 24–44)
MCH RBC QN AUTO: 28.3 PG (ref 27–31)
MCHC RBC AUTO-ENTMCNC: 31.7 G/DL (ref 32–36)
MCV RBC AUTO: 89.2 FL (ref 78–100)
MONOCYTES NFR BLD: 0.26 K/UL
MONOCYTES NFR BLD: 7 % (ref 0–4)
NEUTROPHILS NFR BLD: 53 % (ref 36–66)
NEUTS SEG NFR BLD: 2.02 K/UL
NITRITE UR QL STRIP: ABNORMAL
PH UR STRIP: ABNORMAL [PH] (ref 5–8)
PLATELET # BLD AUTO: 223 K/UL (ref 140–440)
PMV BLD AUTO: 9.2 FL (ref 7.5–11.1)
POTASSIUM SERPL-SCNC: 4.1 MMOL/L (ref 3.5–5.1)
PROT SERPL-MCNC: 7.6 G/DL (ref 6.4–8.2)
PROT UR STRIP-MCNC: ABNORMAL MG/DL
RBC # BLD AUTO: 5.09 M/UL (ref 4.6–6.2)
RBC #/AREA URNS HPF: 62 /HPF (ref 0–2)
SODIUM SERPL-SCNC: 141 MMOL/L (ref 136–145)
SP GR UR STRIP: ABNORMAL (ref 1–1.03)
TRICHOMONAS #/AREA URNS HPF: ABNORMAL /[HPF]
TROPONIN I SERPL HS-MCNC: 28 NG/L (ref 0–22)
UROBILINOGEN UR STRIP-ACNC: ABNORMAL EU/DL (ref 0–1)
WBC #/AREA URNS HPF: 6 /HPF (ref 0–5)
WBC OTHER # BLD: 3.8 K/UL (ref 4–10.5)

## 2024-10-13 PROCEDURE — 74176 CT ABD & PELVIS W/O CONTRAST: CPT

## 2024-10-13 PROCEDURE — 81001 URINALYSIS AUTO W/SCOPE: CPT

## 2024-10-13 PROCEDURE — 36415 COLL VENOUS BLD VENIPUNCTURE: CPT

## 2024-10-13 PROCEDURE — 6360000002 HC RX W HCPCS: Performed by: INTERNAL MEDICINE

## 2024-10-13 PROCEDURE — 84484 ASSAY OF TROPONIN QUANT: CPT

## 2024-10-13 PROCEDURE — 6370000000 HC RX 637 (ALT 250 FOR IP): Performed by: EMERGENCY MEDICINE

## 2024-10-13 PROCEDURE — 1200000000 HC SEMI PRIVATE

## 2024-10-13 PROCEDURE — 51702 INSERT TEMP BLADDER CATH: CPT

## 2024-10-13 PROCEDURE — 85025 COMPLETE CBC W/AUTO DIFF WBC: CPT

## 2024-10-13 PROCEDURE — 6370000000 HC RX 637 (ALT 250 FOR IP): Performed by: INTERNAL MEDICINE

## 2024-10-13 PROCEDURE — 80053 COMPREHEN METABOLIC PANEL: CPT

## 2024-10-13 PROCEDURE — 87086 URINE CULTURE/COLONY COUNT: CPT

## 2024-10-13 PROCEDURE — 99285 EMERGENCY DEPT VISIT HI MDM: CPT

## 2024-10-13 PROCEDURE — 87186 SC STD MICRODIL/AGAR DIL: CPT

## 2024-10-13 PROCEDURE — 87077 CULTURE AEROBIC IDENTIFY: CPT

## 2024-10-13 RX ORDER — LABETALOL HYDROCHLORIDE 5 MG/ML
10 INJECTION, SOLUTION INTRAVENOUS EVERY 6 HOURS PRN
Status: DISCONTINUED | OUTPATIENT
Start: 2024-10-13 | End: 2024-10-15 | Stop reason: HOSPADM

## 2024-10-13 RX ORDER — LIDOCAINE HYDROCHLORIDE 20 MG/ML
JELLY TOPICAL PRN
Status: DISCONTINUED | OUTPATIENT
Start: 2024-10-13 | End: 2024-10-15 | Stop reason: HOSPADM

## 2024-10-13 RX ORDER — ONDANSETRON 4 MG/1
4 TABLET, ORALLY DISINTEGRATING ORAL EVERY 8 HOURS PRN
Status: DISCONTINUED | OUTPATIENT
Start: 2024-10-13 | End: 2024-10-15 | Stop reason: HOSPADM

## 2024-10-13 RX ORDER — ONDANSETRON 2 MG/ML
4 INJECTION INTRAMUSCULAR; INTRAVENOUS EVERY 6 HOURS PRN
Status: DISCONTINUED | OUTPATIENT
Start: 2024-10-13 | End: 2024-10-15 | Stop reason: HOSPADM

## 2024-10-13 RX ORDER — LISINOPRIL 20 MG/1
20 TABLET ORAL DAILY
Status: DISCONTINUED | OUTPATIENT
Start: 2024-10-13 | End: 2024-10-14

## 2024-10-13 RX ORDER — HYDRALAZINE HYDROCHLORIDE 20 MG/ML
10 INJECTION INTRAMUSCULAR; INTRAVENOUS ONCE
Status: DISCONTINUED | OUTPATIENT
Start: 2024-10-13 | End: 2024-10-13

## 2024-10-13 RX ORDER — POLYETHYLENE GLYCOL 3350 17 G/17G
17 POWDER, FOR SOLUTION ORAL DAILY PRN
Status: DISCONTINUED | OUTPATIENT
Start: 2024-10-13 | End: 2024-10-15 | Stop reason: HOSPADM

## 2024-10-13 RX ORDER — SODIUM CHLORIDE 9 MG/ML
INJECTION, SOLUTION INTRAVENOUS PRN
Status: DISCONTINUED | OUTPATIENT
Start: 2024-10-13 | End: 2024-10-15 | Stop reason: HOSPADM

## 2024-10-13 RX ORDER — ACETAMINOPHEN 650 MG/1
650 SUPPOSITORY RECTAL EVERY 6 HOURS PRN
Status: DISCONTINUED | OUTPATIENT
Start: 2024-10-13 | End: 2024-10-15 | Stop reason: HOSPADM

## 2024-10-13 RX ORDER — SODIUM CHLORIDE 0.9 % (FLUSH) 0.9 %
5-40 SYRINGE (ML) INJECTION PRN
Status: DISCONTINUED | OUTPATIENT
Start: 2024-10-13 | End: 2024-10-15 | Stop reason: HOSPADM

## 2024-10-13 RX ORDER — POTASSIUM CHLORIDE 1500 MG/1
40 TABLET, EXTENDED RELEASE ORAL PRN
Status: DISCONTINUED | OUTPATIENT
Start: 2024-10-13 | End: 2024-10-15 | Stop reason: HOSPADM

## 2024-10-13 RX ORDER — SODIUM CHLORIDE 0.9 % (FLUSH) 0.9 %
5-40 SYRINGE (ML) INJECTION EVERY 12 HOURS SCHEDULED
Status: DISCONTINUED | OUTPATIENT
Start: 2024-10-13 | End: 2024-10-15 | Stop reason: HOSPADM

## 2024-10-13 RX ORDER — ACETAMINOPHEN 500 MG
1000 TABLET ORAL ONCE
Status: COMPLETED | OUTPATIENT
Start: 2024-10-13 | End: 2024-10-13

## 2024-10-13 RX ORDER — ENOXAPARIN SODIUM 100 MG/ML
40 INJECTION SUBCUTANEOUS DAILY
Status: CANCELLED | OUTPATIENT
Start: 2024-10-13

## 2024-10-13 RX ORDER — MAGNESIUM SULFATE IN WATER 40 MG/ML
2000 INJECTION, SOLUTION INTRAVENOUS PRN
Status: DISCONTINUED | OUTPATIENT
Start: 2024-10-13 | End: 2024-10-15 | Stop reason: HOSPADM

## 2024-10-13 RX ORDER — POTASSIUM CHLORIDE 7.45 MG/ML
10 INJECTION INTRAVENOUS PRN
Status: DISCONTINUED | OUTPATIENT
Start: 2024-10-13 | End: 2024-10-15 | Stop reason: HOSPADM

## 2024-10-13 RX ORDER — ACETAMINOPHEN 325 MG/1
650 TABLET ORAL EVERY 6 HOURS PRN
Status: DISCONTINUED | OUTPATIENT
Start: 2024-10-13 | End: 2024-10-15 | Stop reason: HOSPADM

## 2024-10-13 RX ADMIN — LISINOPRIL 20 MG: 20 TABLET ORAL at 19:27

## 2024-10-13 RX ADMIN — ACETAMINOPHEN 1000 MG: 500 TABLET ORAL at 19:27

## 2024-10-13 RX ADMIN — LABETALOL HYDROCHLORIDE 10 MG: 5 INJECTION, SOLUTION INTRAVENOUS at 19:27

## 2024-10-13 ASSESSMENT — PAIN SCALES - GENERAL: PAINLEVEL_OUTOF10: 5

## 2024-10-13 ASSESSMENT — PAIN - FUNCTIONAL ASSESSMENT: PAIN_FUNCTIONAL_ASSESSMENT: 0-10

## 2024-10-13 NOTE — DISCHARGE INSTRUCTIONS
Follow-up with your urologist in 1 to 2 days.  Call in the morning for an earlier appointment  Establish a primary care physician Dr. Reese.  Call in the morning for an appointment for evaluation of hypertension and management.  Return to the emergency department immediately if any abdominal pain or any fever chills nausea vomiting dizzy lightheadedness    You has an appointment with     [facility]   [address] [place] [address]  [phone]    Pt's appointment is on November 6, 2024 at 2:00PM.  Please be there at least 15 minutes early to fill out paperwork.      The following is the same as above in [place]    [employer]     [facility]   [address] [place] [address]  [phone]    Nazario Pt a se 6 Novanm 2024 a 2:00PM.  Marbella becker 15 minit bonè kesha dorothy portillo.

## 2024-10-13 NOTE — ED TRIAGE NOTES
Patient states his urinary catheter was changed on Tuesday and he has been seeing blood in his catheter bag since Tuesday.

## 2024-10-13 NOTE — ED PROVIDER NOTES
shock?   No   Exclusion criteria - the patient is NOT to be included for SEP-1 Core Measure due to:  Infection is not suspected        Clinical Impression:  1. Hematuria, unspecified type    2. Enlarged prostate    3. Chronic indwelling Valenzuela catheter    4. Essential hypertension          Comment: Please note this report has been produced using speech recognition software and may contain errors related to that system including errors in grammar, punctuation, and spelling, as well as words and phrases that may be inappropriate.  Efforts were made to edit the dictations.        Tracy Barboza DO  10/17/24 0742

## 2024-10-13 NOTE — ED NOTES
ED TO INPATIENT SBAR HANDOFF    Patient Name: Honorio Colindres   :  1955  68 y.o.   Preferred Name  Honorio  Family/Caregiver Present no   Restraints no   C-SSRS: Risk of Suicide: No Risk  Sitter no   Sepsis Risk Score        Situation  Chief Complaint   Patient presents with    Hematuria     Brief Description of Patient's Condition: Patient arrives ambulatory to the ER with c/o blood in his byrne drainage bag. Patient was seeing urology but they stopped taking his insurance. Patient states he has not had blood in it before. I replaced his byrne and did a bladder irrigation to where it was draining clear. Since patient has blood running back through his urine. Patient has also been hypertensive upon arrival. Patient was medicated with lisinopril and labetalol. Patient is AOX4, respirations equal and unlabored, skin warm and dry.   Mental Status: oriented, alert, and coherent  Arrived from: home    Imaging:   CT ABDOMEN PELVIS WO CONTRAST Additional Contrast? None   Final Result      1.  No evidence of renal, ureteric, or bladder stones. No hydroureter or   hydronephrosis.   2.  Mild right lower lobe infiltrate suggestive close for pneumonia.   3.  Prominent prostatic enlargement similar to prior exam.   4.  Mild interval enlargement of abdominal aortic aneurysm 4.3 cm in size         Electronically signed by Freddie Meredith MD        Abnormal labs:   Abnormal Labs Reviewed   URINALYSIS - Abnormal; Notable for the following components:       Result Value    Color, UA Red (*)     Glucose, Ur   (*)     Value: Results may be affected due to urine color interference.    Bilirubin, Urine   (*)     Value: Results may be affected due to urine color interference.    Ketones, Urine   (*)     Value: Results may be affected due to urine color interference.    Urine Hgb   (*)     Value: Results may be affected due to urine color interference.    Protein, UA   (*)     Value: Results may be affected due to urine color  interference.    Nitrite, Urine   (*)     Value: Results may be affected due to urine color interference.    Leukocyte Esterase, Urine   (*)     Value: Results may be affected due to urine color interference.    All other components within normal limits   MICROSCOPIC URINALYSIS - Abnormal; Notable for the following components:    WBC, UA 6 (*)     RBC, UA 62 (*)     Bacteria, UA RARE (*)     All other components within normal limits   CBC WITH AUTO DIFFERENTIAL - Abnormal; Notable for the following components:    WBC 3.8 (*)     MCHC 31.7 (*)     Monocytes % 7 (*)     All other components within normal limits        Background  History:   Past Medical History:   Diagnosis Date    Hypertension        Assessment    Vitals:        Vitals:    10/13/24 1132 10/13/24 1255 10/13/24 1302 10/13/24 1903   BP: (!) 181/107 (!) 165/100 (!) 177/126 (!) 181/101   Pulse:       Resp:       Temp:       TempSrc:       SpO2: 98% 97% 98% 100%   Height:           PO Status: Regular    O2 Flow Rate:        Cardiac Rhythm: normal sinus    Last documented pain medication administered: tylenol @1925    NIH Score: NIH       Active LDA's:   Peripheral IV 10/13/24 Left Arm (Active)       Pertinent or High Risk Medications/Drips: no   If Yes, please provide details: n/a      Blood Product Administration: no  If Yes, please provide details: n/a    Recommendation    Incomplete orders n/a  Additional Comments: n/a   If any further questions, please call Sending RN at [phone]    Electronically signed by: Electronically signed by Dedra Fisher RN on 10/13/2024 at 7:38 PM

## 2024-10-13 NOTE — ED PROVIDER NOTES
[facility] EMERGENCY DEPARTMENT  TRANSFER OF CARE NOTE  EMERGENCY DEPARTMENT ENCOUNTER          Pt Name: Honorio Colindres  MRN: [id number]  Birthdate 1955  Date of encounter: 10/13/2024  Physician: Nubia Gramajo DO, FACEP      Transfer of Care Information:   Physician Signing out:   Tracy Barboza    Receiving Physician: Nubia Gramajo  Sign out time: 1505      Brief history:  Hematuria, was going to Dr. Reeder who is no longer taking his insurance and could not get in with a new urologist until January.    Has not had blood pressure medication for years  Not taking metoprolol      ED Course as of 10/13/24 1802   Sun Oct 13, 2024   1505 Blood in catheter, Salvadorean Creole  Replaced Tuesday  Irrigated and cleared, red  CT Abd/Pelvis  UA  Enlarged prostate  Seen urology in past  Chronic byrne since Dec  [facility] does not take insurance, has new urology 2025 Jan.  Check for ca/stone,  Imaging pending  Dispo depending imaging [CB]   1619 MPRESSION:     1.  No evidence of renal, ureteric, or bladder stones. No hydroureter or  hydronephrosis.  2.  Mild right lower lobe infiltrate suggestive close for pneumonia.  3.  Prominent prostatic enlargement similar to prior exam.  4.  Mild interval enlargement of abdominal aortic aneurysm 4.3 cm in size        Electronically signed by Freddie Meredith MD        Exam Ended: 10/13/24 14:41 EDT       [CB]      ED Course User Index  [CB] Nubia Gramajo DO       Items pending that need to be checked:  Labs and imaging      Tentative Impression of patient:  Hypertensive urgency  Hematuria    Expected disposition of patient:  Pending results, admitted.        Additional Assessment and results:   I have personally performed a face to face diagnostic evaluation on this patient. The patient's initial evaluation and plan have been discussed with the prior physician who initially evaluated the patient. Nursing Notes, Past Medical Hx, Past          ED Course as of 10/13/24 1802   Sun Oct 13, 2024   1505 Blood in catheter, Jamaican Creole  Replaced Tuesday  Irrigated and cleared, red  CT Abd/Pelvis  UA  Enlarged prostate  Seen urology in past  Chronic byrne since Dec  [facility] does not take insurance, has new urology 2025 Jan.  Check for ca/stone,  Imaging pending  Dispo depending imaging [CB]   1619 MPRESSION:     1.  No evidence of renal, ureteric, or bladder stones. No hydroureter or  hydronephrosis.  2.  Mild right lower lobe infiltrate suggestive close for pneumonia.  3.  Prominent prostatic enlargement similar to prior exam.  4.  Mild interval enlargement of abdominal aortic aneurysm 4.3 cm in size        Electronically signed by Freddie Meredith MD        Exam Ended: 10/13/24 14:41 EDT       [CB]      ED Course User Index  [CB] Nubia Gramajo DO         Further MDM and disposition:   Assessment:   Hypertensive urgency  Hematuria.  Plan:   Hosptialize for further treatment and assessment  tylenol.    Final diagnoses:   Hematuria, unspecified type   Enlarged prostate   Chronic indwelling Byrne catheter   Essential hypertension     New Prescriptions    No medications on file         Condition: condition: fair  Dispo: Admit to telemetry    This transcription was electronically signed. It was dictated by use of voice recognition software and electronically transcribed. The transcription may contain errors not detected in proofreading.        Nubia Gramajo DO  10/13/24 1925

## 2024-10-13 NOTE — H&P
V2.0  History and Physical      Name:  Honorio Colindres /Age/Sex: 1955  (68 y.o. male)   MRN & CSN:  [id number] & [id number] Encounter Date/Time: 10/13/2024 7:08 PM EDT   Location:  ED25/ED-25 PCP: No primary care provider on file.       Hospital Day: 1    Assessment and Plan:   Honorio Colindres is a 68 y.o. male  who presents with Gross hematuria    Hospital Problems             Last Modified POA    * (Principal) Gross hematuria 10/13/2024 Yes     # Gross hematuria  # BPH with urinary retention s/p chronic Valenzuela  -Patient presented to the ED on 10/8 for Valenzuela catheter exchange, has been having hematuria since then  -Hemoglobin stable  Telemetry monitoring  Urology consulted, appreciate recs  Recheck hemoglobin in the morning, transfuse if less than 7    # Hypertensive urgency  -Patient with mild headache  -Not currently on any medications  Start on lisinopril 20 mg daily  Echo ordered  Labetalol 10 mg IV as needed for systolic blood pressure greater than 180 and diastolic greater than 105  Will get hemoglobin A1c and lipid panel  Will likely need to be started on another antihypertensive agent as well, titrate as needed  Troponin ordered    # Worsening AAA  - 4.3 cm, was previously 3.6 cm in 2022  Refer to CT surgery for monitoring    Discussed CODE STATUS with patient, would like to be full code.    Disposition:   Current Living situation: Home  Expected Disposition: Home  Estimated D/C: 2 days    Diet Diet NPO   DVT Prophylaxis [] Lovenox, []  Heparin, [] SCDs, [] Ambulation,  [] Eliquis, [] Xarelto, [] Coumadin  Holding due to gross hematuria   Code Status Prior   Surrogate Decision Maker/ HELEN Alberto Jens      Personally reviewed Lab Studies and Imaging     Discussed management of the case with ED provider who recommended admission    Imaging that was interpreted personally includes CT abdomen pelvis and results AAA    Drugs that require monitoring for toxicity include lisinopril and the method of

## 2024-10-14 ENCOUNTER — APPOINTMENT (OUTPATIENT)
Dept: NON INVASIVE DIAGNOSTICS | Age: 69
DRG: 699 | End: 2024-10-14
Attending: INTERNAL MEDICINE
Payer: COMMERCIAL

## 2024-10-14 LAB
ANION GAP SERPL CALCULATED.3IONS-SCNC: 10 MMOL/L (ref 9–17)
BASOPHILS # BLD: 0.03 K/UL
BASOPHILS NFR BLD: 1 % (ref 0–1)
BUN SERPL-MCNC: 14 MG/DL (ref 7–20)
CALCIUM SERPL-MCNC: 9 MG/DL (ref 8.3–10.6)
CHLORIDE SERPL-SCNC: 105 MMOL/L (ref 99–110)
CHOLEST SERPL-MCNC: 164 MG/DL (ref 125–199)
CO2 SERPL-SCNC: 24 MMOL/L (ref 21–32)
CREAT SERPL-MCNC: 1.3 MG/DL (ref 0.8–1.3)
ECHO AV AREA PEAK VELOCITY: 3.1 CM2
ECHO AV AREA VTI: 2.9 CM2
ECHO AV AREA/BSA PEAK VELOCITY: 1.8 CM2/M2
ECHO AV AREA/BSA VTI: 1.6 CM2/M2
ECHO AV MEAN GRADIENT: 4 MMHG
ECHO AV MEAN VELOCITY: 1 M/S
ECHO AV PEAK GRADIENT: 8 MMHG
ECHO AV PEAK VELOCITY: 1.4 M/S
ECHO AV VELOCITY RATIO: 0.79
ECHO AV VTI: 29.6 CM
ECHO BSA: 1.78 M2
ECHO EST RA PRESSURE: 3 MMHG
ECHO LA AREA 4C: 22.6 CM2
ECHO LA MAJOR AXIS: 5.8 CM
ECHO LA VOL MOD A4C: 70 ML (ref 18–58)
ECHO LA VOLUME INDEX MOD A4C: 40 ML/M2 (ref 16–34)
ECHO LV E' LATERAL VELOCITY: 6.5 CM/S
ECHO LV E' SEPTAL VELOCITY: 5.7 CM/S
ECHO LV EF PHYSICIAN: 50 %
ECHO LV FRACTIONAL SHORTENING: 24 % (ref 28–44)
ECHO LV INTERNAL DIMENSION DIASTOLE INDEX: 2.82 CM/M2
ECHO LV INTERNAL DIMENSION DIASTOLIC: 5 CM (ref 4.2–5.9)
ECHO LV INTERNAL DIMENSION SYSTOLIC INDEX: 2.15 CM/M2
ECHO LV INTERNAL DIMENSION SYSTOLIC: 3.8 CM
ECHO LV IVSD: 1.2 CM (ref 0.6–1)
ECHO LV MASS 2D: 233.7 G (ref 88–224)
ECHO LV MASS INDEX 2D: 132.1 G/M2 (ref 49–115)
ECHO LV POSTERIOR WALL DIASTOLIC: 1.2 CM (ref 0.6–1)
ECHO LV RELATIVE WALL THICKNESS RATIO: 0.48
ECHO LVOT AREA: 4.2 CM2
ECHO LVOT AV VTI INDEX: 0.7
ECHO LVOT DIAM: 2.3 CM
ECHO LVOT MEAN GRADIENT: 2 MMHG
ECHO LVOT PEAK GRADIENT: 4 MMHG
ECHO LVOT PEAK VELOCITY: 1.1 M/S
ECHO LVOT STROKE VOLUME INDEX: 48.8 ML/M2
ECHO LVOT SV: 86.4 ML
ECHO LVOT VTI: 20.8 CM
ECHO MV A VELOCITY: 0.54 M/S
ECHO MV E DECELERATION TIME (DT): 211 MS
ECHO MV E VELOCITY: 0.76 M/S
ECHO MV E/A RATIO: 1.41
ECHO MV E/E' LATERAL: 11.69
ECHO MV E/E' RATIO (AVERAGED): 12.51
ECHO MV E/E' SEPTAL: 13.33
ECHO RIGHT VENTRICULAR SYSTOLIC PRESSURE (RVSP): 16 MMHG
ECHO TV REGURGITANT MAX VELOCITY: 1.82 M/S
ECHO TV REGURGITANT PEAK GRADIENT: 13 MMHG
EOSINOPHIL # BLD: 0.18 K/UL
EOSINOPHILS RELATIVE PERCENT: 4 % (ref 0–3)
ERYTHROCYTE [DISTWIDTH] IN BLOOD BY AUTOMATED COUNT: 14 % (ref 11.7–14.9)
EST. AVERAGE GLUCOSE BLD GHB EST-MCNC: 117 MG/DL
GFR, ESTIMATED: 57 ML/MIN/1.73M2
GLUCOSE SERPL-MCNC: 114 MG/DL (ref 74–99)
HBA1C MFR BLD: 5.7 % (ref 4.2–6.3)
HCT VFR BLD AUTO: 42.2 % (ref 42–52)
HDLC SERPL-MCNC: 42 MG/DL
HGB BLD-MCNC: 13.3 G/DL (ref 13.5–18)
IMM GRANULOCYTES # BLD AUTO: 0.01 K/UL
IMM GRANULOCYTES NFR BLD: 0 %
LDLC SERPL CALC-MCNC: 97 MG/DL
LYMPHOCYTES NFR BLD: 1.69 K/UL
LYMPHOCYTES RELATIVE PERCENT: 37 % (ref 24–44)
MCH RBC QN AUTO: 28.4 PG (ref 27–31)
MCHC RBC AUTO-ENTMCNC: 31.5 G/DL (ref 32–36)
MCV RBC AUTO: 90 FL (ref 78–100)
MONOCYTES NFR BLD: 0.45 K/UL
MONOCYTES NFR BLD: 10 % (ref 0–4)
NEUTROPHILS NFR BLD: 49 % (ref 36–66)
NEUTS SEG NFR BLD: 2.22 K/UL
PLATELET # BLD AUTO: 226 K/UL (ref 140–440)
PMV BLD AUTO: 9.6 FL (ref 7.5–11.1)
POTASSIUM SERPL-SCNC: 3.4 MMOL/L (ref 3.5–5.1)
RBC # BLD AUTO: 4.69 M/UL (ref 4.6–6.2)
SODIUM SERPL-SCNC: 140 MMOL/L (ref 136–145)
TRIGL SERPL-MCNC: 124 MG/DL
WBC OTHER # BLD: 4.6 K/UL (ref 4–10.5)

## 2024-10-14 PROCEDURE — 1200000000 HC SEMI PRIVATE

## 2024-10-14 PROCEDURE — 93306 TTE W/DOPPLER COMPLETE: CPT

## 2024-10-14 PROCEDURE — 85025 COMPLETE CBC W/AUTO DIFF WBC: CPT

## 2024-10-14 PROCEDURE — 80061 LIPID PANEL: CPT

## 2024-10-14 PROCEDURE — 6370000000 HC RX 637 (ALT 250 FOR IP): Performed by: STUDENT IN AN ORGANIZED HEALTH CARE EDUCATION/TRAINING PROGRAM

## 2024-10-14 PROCEDURE — 6370000000 HC RX 637 (ALT 250 FOR IP): Performed by: INTERNAL MEDICINE

## 2024-10-14 PROCEDURE — 6370000000 HC RX 637 (ALT 250 FOR IP): Performed by: PHYSICIAN ASSISTANT

## 2024-10-14 PROCEDURE — 83036 HEMOGLOBIN GLYCOSYLATED A1C: CPT

## 2024-10-14 PROCEDURE — 2580000003 HC RX 258: Performed by: INTERNAL MEDICINE

## 2024-10-14 PROCEDURE — 93306 TTE W/DOPPLER COMPLETE: CPT | Performed by: INTERNAL MEDICINE

## 2024-10-14 PROCEDURE — 99222 1ST HOSP IP/OBS MODERATE 55: CPT | Performed by: THORACIC SURGERY (CARDIOTHORACIC VASCULAR SURGERY)

## 2024-10-14 PROCEDURE — 36415 COLL VENOUS BLD VENIPUNCTURE: CPT

## 2024-10-14 PROCEDURE — 94761 N-INVAS EAR/PLS OXIMETRY MLT: CPT

## 2024-10-14 PROCEDURE — 80048 BASIC METABOLIC PNL TOTAL CA: CPT

## 2024-10-14 RX ORDER — FINASTERIDE 5 MG/1
5 TABLET, FILM COATED ORAL DAILY
Status: DISCONTINUED | OUTPATIENT
Start: 2024-10-14 | End: 2024-10-15 | Stop reason: HOSPADM

## 2024-10-14 RX ORDER — HYDRALAZINE HYDROCHLORIDE 20 MG/ML
10 INJECTION INTRAMUSCULAR; INTRAVENOUS EVERY 6 HOURS PRN
Status: DISCONTINUED | OUTPATIENT
Start: 2024-10-14 | End: 2024-10-15 | Stop reason: HOSPADM

## 2024-10-14 RX ORDER — HYDRALAZINE HYDROCHLORIDE 20 MG/ML
10 INJECTION INTRAMUSCULAR; INTRAVENOUS ONCE
Status: DISCONTINUED | OUTPATIENT
Start: 2024-10-14 | End: 2024-10-14 | Stop reason: HOSPADM

## 2024-10-14 RX ORDER — HYDRALAZINE HYDROCHLORIDE 20 MG/ML
10 INJECTION INTRAMUSCULAR; INTRAVENOUS ONCE
Status: DISCONTINUED | OUTPATIENT
Start: 2024-10-14 | End: 2024-10-15 | Stop reason: HOSPADM

## 2024-10-14 RX ORDER — NIFEDIPINE 30 MG/1
30 TABLET, EXTENDED RELEASE ORAL DAILY
Status: DISCONTINUED | OUTPATIENT
Start: 2024-10-14 | End: 2024-10-15 | Stop reason: HOSPADM

## 2024-10-14 RX ADMIN — POTASSIUM CHLORIDE 40 MEQ: 1500 TABLET, EXTENDED RELEASE ORAL at 13:30

## 2024-10-14 RX ADMIN — SODIUM CHLORIDE, PRESERVATIVE FREE 10 ML: 5 INJECTION INTRAVENOUS at 08:38

## 2024-10-14 RX ADMIN — SODIUM CHLORIDE, PRESERVATIVE FREE 10 ML: 5 INJECTION INTRAVENOUS at 20:35

## 2024-10-14 RX ADMIN — FINASTERIDE 5 MG: 5 TABLET, FILM COATED ORAL at 13:30

## 2024-10-14 RX ADMIN — LISINOPRIL 20 MG: 20 TABLET ORAL at 08:37

## 2024-10-14 RX ADMIN — NIFEDIPINE 30 MG: 30 TABLET, FILM COATED, EXTENDED RELEASE ORAL at 13:30

## 2024-10-14 ASSESSMENT — PAIN SCALES - GENERAL: PAINLEVEL_OUTOF10: 0

## 2024-10-14 NOTE — PLAN OF CARE
Problem: Discharge Planning  Goal: Discharge to home or other facility with appropriate resources  Outcome: Progressing  Flowsheets (Taken 10/14/2024 0510)  Discharge to home or other facility with appropriate resources:   Identify barriers to discharge with patient and caregiver   Arrange for interpreters to assist at discharge as needed     Problem: Pain  Goal: Verbalizes/displays adequate comfort level or baseline comfort level  Outcome: Progressing  Flowsheets (Taken 10/14/2024 0510)  Verbalizes/displays adequate comfort level or baseline comfort level:   Encourage patient to monitor pain and request assistance   Implement non-pharmacological measures as appropriate and evaluate response

## 2024-10-14 NOTE — PROGRESS NOTES
V2.0    [facility] Progress Note      Name:  Honorio Colindres /Age/Sex: 1955  (68 y.o. male)   MRN & CSN:  [id number] & [id number] Encounter Date/Time: 10/14/2024 11:59 AM EDT   [facility] PCP: No primary care provider on file.     Attending:Dereck Meléndez MD       Hospital Day: 2    Assessment and Recommendations   Honorio Colindres is a 68 y.o. male  who presents with Gross hematuria      # Gross hematuria  # BPH with urinary retention s/p chronic Valenzuela  -Patient presented to the ED on 10/8 for Valenzuela catheter exchange, has been having hematuria since then  -Hemoglobin stable  -Continue bladder irrigation as needed  -Urology consulted, appreciate recs.  Patient would need outpatient follow-up with urology.  Does not have an established urologist.  CM consulted for assistance     # Hypertensive urgency  -Patient with mild headache  -Discontinue lisinopril.  Start Procardia and titrate up as appropriate  -Hydralazine as needed     # Worsening AAA  - 4.3 cm, was previously 3.6 cm in 2022  -CT surgery to evaluate      Diet ADULT DIET; Regular   DVT Prophylaxis [] Lovenox, []  Heparin, [] SCDs, [] Ambulation,  [] Eliquis, [] Xarelto  [] Coumadin   Code Status Full Code   Disposition From: Home  Expected Disposition: Home  Estimated Date of Discharge: Tomorrow  Patient requires continued admission due to hematuria/hypertensive urgency   Surrogate Decision Maker/ POA       Personally reviewed Lab Studies and Imaging           Subjective:     Chief Complaint:     Patient seen and examined at bedside this morning.  Reports hematuria.  Denies chest pain, shortness of breath, fever or chills      Review of Systems:      Pertinent positives and negatives discussed in HPI    Objective:   No intake or output data in the 24 hours ending 10/14/24 1159   Vitals:   Vitals:    10/13/24 1903 10/13/24 2043 10/14/24 0820 10/14/24 1043   BP: (!) 181/101 129/70 (!) 144/95    Pulse:  62 62    Resp:  18 20    Temp:  98.2  appear normal. No stones are identified in either kidney or along the course of either ureter and no hydronephrosis or hydroureter is seen. The urinary bladder appears normal. Valenzuela catheter present with scattered air in the bladder. The stomach, small and large bowel are normal in caliber without indication of obstruction. The appendix appears normal. No free intraperitoneal air is seen. No lymphadenopathy is seen. Abdominal aortic aneurysm mildly increased in size now measuring 4.3 cm S compared to 3.5 cm. Advanced prostate enlargement measuring approximately 8 cm in transverse dimension. Bone windows demonstrate no suspicious lytic or blastic lesions.     1.  No evidence of renal, ureteric, or bladder stones. No hydroureter or hydronephrosis. 2.  Mild right lower lobe infiltrate suggestive close for pneumonia. 3.  Prominent prostatic enlargement similar to prior exam. 4.  Mild interval enlargement of abdominal aortic aneurysm 4.3 cm in size Electronically signed by Freddie Meredith MD      CBC:   Recent Labs     10/13/24  1629 10/14/24  0235   WBC 3.8* 4.6   HGB 14.4 13.3*    226     BMP:    Recent Labs     10/13/24  1629 10/14/24  0235    140   K 4.1 3.4*    105   CO2 27 24   BUN 10 14   CREATININE 1.1 1.3   GLUCOSE 94 114*     Hepatic:   Recent Labs     10/13/24  1629   AST 31   ALT 12   BILITOT 0.6   ALKPHOS 67     Lipids:   Lab Results   Component Value Date/Time    CHOL 164 10/14/2024 02:35 AM    HDL 42 10/14/2024 02:35 AM    TRIG 124 10/14/2024 02:35 AM     Hemoglobin A1C:   Lab Results   Component Value Date/Time    LABA1C 5.7 10/14/2024 02:35 AM     TSH: No results found for: \"TSH\"  Troponin:   Lab Results   Component Value Date/Time    TROPONINT <0.010 12/14/2022 02:10 PM     Lactic Acid: No results for input(s): \"LACTA\" in the last 72 hours.  BNP: No results for input(s): \"PROBNP\" in the last 72 hours.  UA:  Lab Results   Component Value Date/Time    NITRU  10/13/2024 10:20 AM

## 2024-10-14 NOTE — PROGRESS NOTES
4 Eyes Skin Assessment     NAME:  Honoiro Colindres  YOB: 1955  MEDICAL RECORD NUMBER:  [id number]    The patient is being assessed for  Admission    I agree that at least one RN has performed a thorough Head to Toe Skin Assessment on the patient. ALL assessment sites listed below have been assessed.      Areas assessed by both nurses:    Head, Face, Ears, Shoulders, Back, Chest, Arms, Elbows, Hands, Sacrum. Buttock, Coccyx, Ischium, Legs. Feet and Heels, and Under Medical Devices         Does the Patient have a Wound? No noted wound(s)       Buck Prevention initiated by RN: No  Wound Care Orders initiated by RN: No    Pressure Injury (Stage 3,4, Unstageable, DTI, NWPT, and Complex wounds) if present, place Wound referral order by RN under : No    New Ostomies, if present place, Ostomy referral order under : No     Nurse 1 eSignature: Electronically signed by SENG TATUM RN on 10/14/24 at 6:25 PM EDT    **SHARE this note so that the co-signing nurse can place an eSignature**    Nurse 2 eSignature: Electronically signed by Tracy Melgoza RN on 10/14/24 at 7:23 PM EDT

## 2024-10-14 NOTE — CARE COORDINATION
LSW received a call from Dr. Meléndez who asked for this LSW to find pt a Urologist.  LSW went pt's insurance web site and there are not Urologist in the [place] area that take pt commercial insurance.  There is a group in [place] that this LSW called and left a message asking for a return call.  There is another group in [place] LSW will call to check if they have any availability.      LSW received a return call from the doctor in [place] and LSW was informed that their urologist is in [facility]. [facility] may be too far for pt to get to.  LSW spoke using interrupter [id number] with pt and he would like LSW to try the [facility] in [place].      LSW called [facility] (in network)  LSW was informed that for a monthly byrne change that do not have the schedule for that and they are 6-7 months out.  The staff informed this LSW that the [facility] in [place] may be able to accommodate pt.  LSW called and gave staff the pt info.  They stated they will need to look over pt info and see if they can schedule pt.  LSW faxed face sheet, H&P, labs and Urology newest note the [facility].  They will call this LSW to schedule his appt.

## 2024-10-14 NOTE — CONSULTS
Cardiothoracic Surgery  IN-PATIENT SERVICE   [facility]    CONSULTATION / HISTORY AND PHYSICAL EXAMINATION            Date:   10/14/2024  Patient name:  Honorio Colindres  Date of admission:  10/13/2024 11:25 AM  MRN:   [id number]  Account:  [id number]  YOB: 1955  PCP:    No primary care provider on file.  Room:   [address] [place] [address]  Code Status:    Full Code    Physician Requesting Consult: Dereck Meléndez MD    Reason for Consult:  AAA    Chief Complaint:     Hematuria    History of Present Illness:     Honorio Colindres is a 68 y.o. male with pmh of hypertension, BPH with chronic urinary retention who presents with hematuria.  Patient states that his urologist no longer accepts his insurance so on Tuesday he came to the ER for a Valenzuela catheter change (patient has chronic Valenzuela which he gets changed every month) and he has been having bloody urine ever since.  Patient denies fever, chills, chest pain, shortness of breath, lightheadedness, dizziness, nausea, vomiting, abdominal pain, diarrhea or constipation.  Patient does endorse a headache.  Patient is a non-smoker, no alcohol or drug use.     CT abdomen pelvis showed no evidence of renal, ureteric or bladder stones, no hydroureter or hydronephrosis, prominent prostatic enlargement similar to prior.  Mild interval enlargement of AAA 4.3 cm in size previously 3.6 cm in 12/2022     Past Medical History:     Past Medical History:   Diagnosis Date    Hypertension         Past Surgical History:     Past Surgical History:   Procedure Laterality Date    ABDOMEN SURGERY      COLONOSCOPY N/A 6/23/2022    COLONOSCOPY DIAGNOSTIC performed by Nahun Lovell MD at [facility] ENDOSCOPY        Medications Prior to Admission:     Prior to Admission medications    Medication Sig Start Date End Date Taking? Authorizing Provider   metoprolol tartrate (LOPRESSOR) 25 MG tablet Take 1 tablet by mouth 2 times daily 6/23/22   Yoli Richter MD  no icterus, redness, pupils equal and reactive, extraocular eye movements intact, conjunctiva clear  Ear: normal external ear, no discharge, hearing intact  Nose:  no drainage noted  Mouth: mucous membranes moist  Neck: supple, no carotid bruits  Lungs: Bilateral equal air entry, clear to ausculation, no wheezing, rales or rhonchi, normal effort  Cardiovascular: normal rate, regular rhythm, no murmur, gallop, rub.  Abdomen: Soft, nontender, nondistended, normal bowel sounds  Neurologic: There are no new focal motor or sensory deficits, normal muscle tone and bulk, no abnormal sensation, normal speech  Skin: No gross lesions, rashes, bruising or bleeding on exposed skin area  Extremities:  peripheral pulses palpable, no pedal edema or calf pain with palpation  Psych: normal affect    Investigations:      Laboratory Testing:  Recent Results (from the past 24 hour(s))   CBC with Auto Differential    Collection Time: 10/13/24  4:29 PM   Result Value Ref Range    WBC 3.8 (L) 4.0 - 10.5 k/uL    RBC 5.09 4.60 - 6.20 m/uL    Hemoglobin 14.4 13.5 - 18.0 g/dL    Hematocrit 45.4 42.0 - 52.0 %    MCV 89.2 78.0 - 100.0 fL    MCH 28.3 27.0 - 31.0 pg    MCHC 31.7 (L) 32.0 - 36.0 g/dL    RDW 13.7 11.7 - 14.9 %    Platelets 223 140 - 440 k/uL    MPV 9.2 7.5 - 11.1 fL    Neutrophils % 53 36 - 66 %    Lymphocytes % 38 24 - 44 %    Monocytes % 7 (H) 0 - 4 %    Eosinophils % 1 0.0 - 3.0 %    Basophils % 0 0 - 1 %    Immature Granulocytes % 0 0 %    Neutrophils Absolute 2.02 k/uL    Lymphocytes Absolute 1.45 k/uL    Monocytes Absolute 0.26 k/uL    Eosinophils Absolute 0.04 k/uL    Basophils Absolute 0.01 k/uL    Immature Granulocytes Absolute 0.00 k/uL   Comprehensive Metabolic Panel    Collection Time: 10/13/24  4:29 PM   Result Value Ref Range    Sodium 141 136 - 145 mmol/L    Potassium 4.1 3.5 - 5.1 mmol/L    Chloride 104 99 - 110 mmol/L    CO2 27 21 - 32 mmol/L    Anion Gap 10 9 - 17 mmol/L    Glucose 94 74 - 99 mg/dL    BUN 10 7 -

## 2024-10-14 NOTE — CARE COORDINATION
Pt has an appointment with     [facility]   [address] [place] [address] [address] [place] [address]  [phone]    Pt's appointment is on November 6, 2024 at 2:00PM.  Please be there at least 15 minutes early to fill out paperwork.      LSW placed the above info on pt AVS.  Pt informed of this info as well.

## 2024-10-14 NOTE — PLAN OF CARE
Problem: Discharge Planning  Goal: Discharge to home or other facility with appropriate resources  10/14/2024 1832 by Ivan Irwin, RN  Outcome: Progressing     Problem: Pain  Goal: Verbalizes/displays adequate comfort level or baseline comfort level  10/14/2024 1832 by Ivan Irwin, RN  Outcome: Progressing     Problem: Genitourinary - Adult  Goal: Urinary catheter remains patent  Outcome: Progressing     Problem: Infection - Adult  Goal: Absence of infection during hospitalization  Outcome: Progressing

## 2024-10-14 NOTE — CONSULTS
Lab Results   Component Value Date/Time     10/14/2024 02:35 AM    K 3.4 10/14/2024 02:35 AM     10/14/2024 02:35 AM    CO2 24 10/14/2024 02:35 AM    BUN 14 10/14/2024 02:35 AM    CREATININE 1.3 10/14/2024 02:35 AM    CALCIUM 9.0 10/14/2024 02:35 AM    GFRAA >60 06/23/2022 10:54 AM    LABGLOM 57 10/14/2024 02:35 AM    LABGLOM >60 12/14/2022 02:10 PM     PT/INR:    Lab Results   Component Value Date/Time    PROTIME 11.8 06/23/2022 10:54 AM    INR 0.92 06/23/2022 10:54 AM     Urine Culture: pending    Imaging:  I personally reviewed images  CT ABDOMEN PELVIS WO CONTRAST Additional Contrast? None    Result Date: 10/13/2024  EXAM: CT ABDOMEN AND PELVIS WITHOUT CONTRAST INDICATION: hematuria, hx of bladder calculus, urinary retension, large prostate COMPARISON: December 14, 2022 TECHNIQUE: Axial CT imaging obtained from lung bases through pelvis. Axial images and multiplanar reformatted images are provided for review. Up-to-date CT equipment and radiation dose reduction techniques were employed. IV Contrast: None. Oral Contrast: No. FINDINGS: Mild infiltrate right lower lobe present. Mild cardiomegaly without pericardial effusion Noncontrast images of the liver appear normal without biliary ductal dilatation. The gallbladder appears normal. Noncontrast images of the spleen, pancreas, and adrenal glands appear normal. No stones are identified in either kidney or along the course of either ureter and no hydronephrosis or hydroureter is seen. The urinary bladder appears normal. Valenzuela catheter present with scattered air in the bladder. The stomach, small and large bowel are normal in caliber without indication of obstruction. The appendix appears normal. No free intraperitoneal air is seen. No lymphadenopathy is seen. Abdominal aortic aneurysm mildly increased in size now measuring 4.3 cm S compared to 3.5 cm. Advanced prostate enlargement measuring approximately 8 cm in transverse dimension. Bone windows

## 2024-10-15 ENCOUNTER — TELEPHONE (OUTPATIENT)
Dept: CARDIOTHORACIC SURGERY | Age: 69
End: 2024-10-15

## 2024-10-15 VITALS
SYSTOLIC BLOOD PRESSURE: 122 MMHG | WEIGHT: 150 LBS | HEART RATE: 67 BPM | DIASTOLIC BLOOD PRESSURE: 70 MMHG | HEIGHT: 66 IN | OXYGEN SATURATION: 97 % | BODY MASS INDEX: 24.11 KG/M2 | TEMPERATURE: 97.5 F | RESPIRATION RATE: 16 BRPM

## 2024-10-15 LAB
BASOPHILS # BLD: 0.02 K/UL
BASOPHILS NFR BLD: 0 % (ref 0–1)
EOSINOPHIL # BLD: 0.19 K/UL
EOSINOPHILS RELATIVE PERCENT: 4 % (ref 0–3)
ERYTHROCYTE [DISTWIDTH] IN BLOOD BY AUTOMATED COUNT: 14 % (ref 11.7–14.9)
HCT VFR BLD AUTO: 42.4 % (ref 42–52)
HGB BLD-MCNC: 13.3 G/DL (ref 13.5–18)
IMM GRANULOCYTES # BLD AUTO: 0.02 K/UL
IMM GRANULOCYTES NFR BLD: 0 %
LYMPHOCYTES NFR BLD: 1.73 K/UL
LYMPHOCYTES RELATIVE PERCENT: 34 % (ref 24–44)
MCH RBC QN AUTO: 28.3 PG (ref 27–31)
MCHC RBC AUTO-ENTMCNC: 31.4 G/DL (ref 32–36)
MCV RBC AUTO: 90.2 FL (ref 78–100)
MICROORGANISM SPEC CULT: ABNORMAL
MICROORGANISM SPEC CULT: ABNORMAL
MONOCYTES NFR BLD: 0.44 K/UL
MONOCYTES NFR BLD: 9 % (ref 0–4)
NEUTROPHILS NFR BLD: 53 % (ref 36–66)
NEUTS SEG NFR BLD: 2.69 K/UL
PLATELET # BLD AUTO: 209 K/UL (ref 140–440)
PMV BLD AUTO: 9.1 FL (ref 7.5–11.1)
POTASSIUM SERPL-SCNC: 4.1 MMOL/L (ref 3.5–5.1)
RBC # BLD AUTO: 4.7 M/UL (ref 4.6–6.2)
SERVICE CMNT-IMP: ABNORMAL
SPECIMEN DESCRIPTION: ABNORMAL
WBC OTHER # BLD: 5.1 K/UL (ref 4–10.5)

## 2024-10-15 PROCEDURE — 2580000003 HC RX 258: Performed by: INTERNAL MEDICINE

## 2024-10-15 PROCEDURE — 6370000000 HC RX 637 (ALT 250 FOR IP): Performed by: PHYSICIAN ASSISTANT

## 2024-10-15 PROCEDURE — 85025 COMPLETE CBC W/AUTO DIFF WBC: CPT

## 2024-10-15 PROCEDURE — 84132 ASSAY OF SERUM POTASSIUM: CPT

## 2024-10-15 PROCEDURE — 36415 COLL VENOUS BLD VENIPUNCTURE: CPT

## 2024-10-15 PROCEDURE — 99232 SBSQ HOSP IP/OBS MODERATE 35: CPT | Performed by: THORACIC SURGERY (CARDIOTHORACIC VASCULAR SURGERY)

## 2024-10-15 PROCEDURE — 2580000003 HC RX 258: Performed by: STUDENT IN AN ORGANIZED HEALTH CARE EDUCATION/TRAINING PROGRAM

## 2024-10-15 PROCEDURE — 6370000000 HC RX 637 (ALT 250 FOR IP): Performed by: STUDENT IN AN ORGANIZED HEALTH CARE EDUCATION/TRAINING PROGRAM

## 2024-10-15 PROCEDURE — 94761 N-INVAS EAR/PLS OXIMETRY MLT: CPT

## 2024-10-15 PROCEDURE — 6360000002 HC RX W HCPCS: Performed by: STUDENT IN AN ORGANIZED HEALTH CARE EDUCATION/TRAINING PROGRAM

## 2024-10-15 RX ORDER — FINASTERIDE 5 MG/1
5 TABLET, FILM COATED ORAL DAILY
Qty: 30 TABLET | Refills: 3 | Status: SHIPPED | OUTPATIENT
Start: 2024-10-16

## 2024-10-15 RX ORDER — NIFEDIPINE 30 MG/1
30 TABLET, EXTENDED RELEASE ORAL DAILY
Qty: 30 TABLET | Refills: 3 | Status: SHIPPED | OUTPATIENT
Start: 2024-10-16

## 2024-10-15 RX ORDER — CEFDINIR 300 MG/1
300 CAPSULE ORAL 2 TIMES DAILY
Qty: 10 CAPSULE | Refills: 0 | Status: SHIPPED | OUTPATIENT
Start: 2024-10-15 | End: 2024-10-20

## 2024-10-15 RX ADMIN — FINASTERIDE 5 MG: 5 TABLET, FILM COATED ORAL at 08:57

## 2024-10-15 RX ADMIN — WATER 1000 MG: 1 INJECTION INTRAMUSCULAR; INTRAVENOUS; SUBCUTANEOUS at 08:57

## 2024-10-15 RX ADMIN — SODIUM CHLORIDE, PRESERVATIVE FREE 10 ML: 5 INJECTION INTRAVENOUS at 08:58

## 2024-10-15 RX ADMIN — NIFEDIPINE 30 MG: 30 TABLET, FILM COATED, EXTENDED RELEASE ORAL at 08:58

## 2024-10-15 ASSESSMENT — PAIN SCALES - GENERAL: PAINLEVEL_OUTOF10: 0

## 2024-10-15 NOTE — CARE COORDINATION
Discharge plan remains the same for pt to go home.      Pt has an appointment with     [facility]   [address] [place] [address]  [phone]    Pt's appointment is on November 6, 2024 at 2:00PM.  Please be there at least 15 minutes early to fill out paperwork.      LSW placed the above info on pt AVS in English and in pt's spoken language.  Pt informed of this info as well.

## 2024-10-15 NOTE — PROGRESS NOTES
Completed discharge with patient via , requires rides plus, form sent to security, security informed their fax machine is down. Security states will call back. -JS  1700: Patient utilized convenient transportation instead of rides plus.

## 2024-10-15 NOTE — PROGRESS NOTES
pancreas, and  adrenal glands appear normal.     No stones are identified in either kidney or along the course of either ureter  and no hydronephrosis or hydroureter is seen. The urinary bladder appears  normal. Valenzuela catheter present with scattered air in the bladder.     The stomach, small and large bowel are normal in caliber without indication of  obstruction. The appendix appears normal. No free intraperitoneal air is seen.  No lymphadenopathy is seen. Abdominal aortic aneurysm mildly increased in size  now measuring 4.3 cm S compared to 3.5 cm. Advanced prostate enlargement  measuring approximately 8 cm in transverse dimension. Bone windows demonstrate  no suspicious lytic or blastic lesions.     IMPRESSION:     1.  No evidence of renal, ureteric, or bladder stones. No hydroureter or  hydronephrosis.  2.  Mild right lower lobe infiltrate suggestive close for pneumonia.  3.  Prominent prostatic enlargement similar to prior exam.  4.  Mild interval enlargement of abdominal aortic aneurysm 4.3 cm in size    Echo  Left Ventricle Low normal left ventricular systolic function with a visually estimated EF of 50 - 55%. Mildly increased wall thickness.   Left Atrium Left atrium is dilated.   Interatrial Septum No interatrial shunt visualized with color Doppler.   Right Ventricle Right ventricle size is normal. Normal systolic function.   Right Atrium Right atrium size is normal.   Aortic Valve Sclerosis of the aortic valve cusps.   Mitral Valve Trace regurgitation.   Tricuspid Valve Physiologically normal regurgitation.   Pulmonic Valve The pulmonic valve visualization is suboptimal but appears to be functioning normally. Physiologically normal regurgitation. No stenosis noted.   Pulmonary Artery Pulmonary artery was not assessed.   Aorta Normal sized ascending aorta.   IVC/Hepatic Veins IVC size is normal.   Pericardium No pericardial effusion.         Assessment/Plan:      Mild interval enlargement of AAA 4.3 cm in  size previously 3.6 cm in 12/2022  Echo reviewed  No need for surgical intervention.  Follow-up in the office in 6 months with repeat CTA abdomen and pelvis  We will sign off at this time.  No further recommendations from a vascular surgery standpoint.  Hypertensive urgency  -Patient with mild headache  -Not currently on any medications  Start on lisinopril 20 mg daily  -Labetalol 10 mg IV as needed for systolic blood pressure greater than 180 and diastolic greater than 105    3.  BPH with urinary retention s/p chronic Valenzuela  -Patient presented to the ED on 10/8 for Valenzuela catheter exchange, has been having hematuria since then  -Hemoglobin stable  Telemetry monitoring  Urology consulted, appreciate recs    4. E-coli UTI on UA: Rocephin x 5 days  -Further recommendations per Dr. Watt      Cardiothoracic Surgery Attending Note:    I have seen Honorio Colindres and interviewed him and examined him on 10/15/2024. I agree with the above recorded findings and assessment. He has no complaints. His abdominal exam is benign.    Assessment:  In brief, he has an asymptomatic enlarging abdominal aortic aneurysm. We will follow this up, and he will return to my office in 6 months - we will obtain a follow up CT abdomen/pelvis at that time.     Plan:   Continue current management.   We will continue to follow.    Hung Watt MD    Today, I personally spent 35 minutes with the patient, of which greater than 50% of the time was in direct face to face contact with the patient. The time was spent in patient education and counseling as described above, in reviewing his studies and coordinating his future care.       New Consults 8:00AM-4:30PM: Call Office, 4:30PM to 8:00AM Surgeon on-call    CVICU or other units patient follow up: Comfort author of this note 8:00AM-4:30PM    CVICU patient or urgent follow up: 4:30PM to 8:00AM Call or Page Surgeon on-call     Step-down patient follow up: 4:30PM to 8:00AM Page or

## 2024-10-15 NOTE — PROGRESS NOTES
Spiritual Health History and Assessment/Progress Note  [facility]    Spiritual/Emotional Needs,  , Adjustment to illness,      Name: Honorio Colindres MRN: [id number]    Age: 68 y.o.     Sex: male   Language: Creole   Congregational: None   Gross hematuria     Date: 10/15/2024            Total Time Calculated: 21 min              Spiritual Assessment began in [facility]        Referral/Consult From: Patient, Nurse   Encounter Overview/Reason: Spiritual/Emotional Needs  Service Provided For: Patient and family together    Norma, Belief, Meaning:   Patient identifies as spiritual and has beliefs or practices that help with coping during difficult times  Family/Friends No family/friends present      Importance and Influence:  Patient has spiritual/personal beliefs that influence decisions regarding their health  Family/Friends No family/friends present    Community:  Patient expresses feelings of isolation: disconnected from family/friends and feeling there is no one to turn to for help  Family/Friends No family/friends present    Assessment and Plan of Care:     Patient Interventions include: Facilitated expression of thoughts and feelings, Explored spiritual coping/struggle/distress, and Affirmed coping skills/support systems  Family/Friends Interventions include: No family/friends present    Patient Plan of Care: Spiritual Care available upon further referral  Family/Friends Plan of Care: No future visits per patient/family request    Electronically signed by SHANIQUE Sheppard on 10/15/2024 at 12:31 PM

## 2024-10-15 NOTE — PROGRESS NOTES
[address] [address]   Progress Note  [facility] 0 1 2      Date: 10/15/2024   Patient: Honorio Colindres   : 1955   DOA: 10/13/2024   MRN: [id number]   ROOM#: 3001/3001-A     Admit Date: 10/13/2024     Collaborating Urologist on Call at time of admission: Dr. HENDERSON   CC: Hematuria   Reason for Consult: Gross hematuria after Valenzuela placed on Tuesday     Subjective:     Pain: none, no nausea and no vomiting,   Bowel Movement/Flatus: Yes  Voiding: Indwelling catheter with clear yellow urine     Pt resting in bed, reports feeling well this morning.    Objective:    Vitals:   /69   Pulse 58   Temp 98.3 °F (36.8 °C) (Oral)   Resp 19   Ht 1.676 m (5' 6\")   Wt 68 kg (150 lb)   SpO2 98%   BMI 24.21 kg/m²   Temp  Av.3 °F (36.8 °C)  Min: 98.3 °F (36.8 °C)  Max: 98.3 °F (36.8 °C)    Intake/Output Summary (Last 24 hours) at 10/15/2024 0803  Last data filed at 10/15/2024 0600  Gross per 24 hour   Intake --   Output 800 ml   Net -800 ml     Physical Exam:  Gen: Pleasant male, in NAD  Neuro: Non-focal  Resp: Unlabored breathing  Abd: Soft, non-distended, non-tender to palpation, no rebound  Back:   No CVAT  :      Indwelling catheter with hazy yellow urine in tubing.  Ext: No edema of bilateral LEs    Labs:  WBC:    Lab Results   Component Value Date/Time    WBC 5.1 10/15/2024 02:11 AM     Hemoglobin/Hematocrit:    Lab Results   Component Value Date/Time    HGB 13.3 10/15/2024 02:11 AM    HCT 42.4 10/15/2024 02:11 AM     BMP:   Lab Results   Component Value Date/Time     10/14/2024 02:35 AM    K 3.4 10/14/2024 02:35 AM     10/14/2024 02:35 AM    CO2 24 10/14/2024 02:35 AM    BUN 14 10/14/2024 02:35 AM    CREATININE 1.3 10/14/2024 02:35 AM    CALCIUM 9.0 10/14/2024 02:35 AM    GFRAA >60 2022 10:54 AM    LABGLOM 57 10/14/2024 02:35 AM    LABGLOM >60 2022 02:10 PM     Urine Culture: +Citrobacter Koseri & E.coli, sensitivities pending    Imaging:  I personally reviewed  images  Echo (TTE) complete (PRN contrast/bubble/strain/3D)    Result Date: 10/14/2024    Left Ventricle: Low normal left ventricular systolic function with a visually estimated EF of 50 - 55%. Mildly increased wall thickness.   Aortic Valve: Sclerosis of the aortic valve cusps.   Mitral Valve: Trace regurgitation.   Tricuspid Valve: Physiologically normal regurgitation.   Left Atrium: Left atrium is dilated.   Pericardium: No pericardial effusion.   Image quality is adequate.     CT ABDOMEN PELVIS WO CONTRAST Additional Contrast? None    Result Date: 10/13/2024  EXAM: CT ABDOMEN AND PELVIS WITHOUT CONTRAST INDICATION: hematuria, hx of bladder calculus, urinary retension, large prostate COMPARISON: December 14, 2022 TECHNIQUE: Axial CT imaging obtained from lung bases through pelvis. Axial images and multiplanar reformatted images are provided for review. Up-to-date CT equipment and radiation dose reduction techniques were employed. IV Contrast: None. Oral Contrast: No. FINDINGS: Mild infiltrate right lower lobe present. Mild cardiomegaly without pericardial effusion Noncontrast images of the liver appear normal without biliary ductal dilatation. The gallbladder appears normal. Noncontrast images of the spleen, pancreas, and adrenal glands appear normal. No stones are identified in either kidney or along the course of either ureter and no hydronephrosis or hydroureter is seen. The urinary bladder appears normal. Valenzuela catheter present with scattered air in the bladder. The stomach, small and large bowel are normal in caliber without indication of obstruction. The appendix appears normal. No free intraperitoneal air is seen. No lymphadenopathy is seen. Abdominal aortic aneurysm mildly increased in size now measuring 4.3 cm S compared to 3.5 cm. Advanced prostate enlargement measuring approximately 8 cm in transverse dimension. Bone windows demonstrate no suspicious lytic or blastic lesions.     1.  No evidence of

## 2024-10-15 NOTE — TELEPHONE ENCOUNTER
Per DANGELO Alvarado Mecene [birth year] needs consult with [facility] in 6 months for AAA with repeat CTA abdomen and pelvis.    Appointment scheduled on 4/17/25 at 1340. Candice is aware. Regina Mckeon is aware.

## 2024-10-15 NOTE — PLAN OF CARE
Problem: Discharge Planning  Goal: Discharge to home or other facility with appropriate resources  10/15/2024 1139 by Tracy Melgoza RN  Outcome: Adequate for Discharge  10/15/2024 0051 by Mark Strauss RN  Outcome: Progressing     Problem: Pain  Goal: Verbalizes/displays adequate comfort level or baseline comfort level  10/15/2024 1139 by Tracy Melgoza RN  Outcome: Adequate for Discharge  10/15/2024 0051 by Mark Strauss RN  Outcome: Progressing     Problem: Genitourinary - Adult  Goal: Urinary catheter remains patent  10/15/2024 1139 by Tracy Melgoza RN  Outcome: Adequate for Discharge  10/15/2024 0051 by Mark Strauss RN  Outcome: Progressing     Problem: Infection - Adult  Goal: Absence of infection during hospitalization  10/15/2024 1139 by Tracy Melgoza RN  Outcome: Adequate for Discharge  10/15/2024 0051 by Mark Strauss RN  Outcome: Progressing

## 2024-10-15 NOTE — PROGRESS NOTES
10/15/24 1229   Encounter Summary   Encounter Overview/Reason Spiritual/Emotional Needs   Encounter Code  Assessment by  services   Service Provided For Patient and family together   Referral/Consult From Patient;Nurse   Support System Family members   Last Encounter  10/15/24  (PT is Venezuelan, we used the  machine, PT shared his story, he expressed feeling better, he likes his caregivers, he does not have support, he believes in God for peace, I gave him a cross for comfort, he wanted prayer, he thanked me for coming.)   Complexity of Encounter Moderate   Begin Time 1210   End Time  1231   Total Time Calculated 21 min   Spiritual/Emotional needs   Type Spiritual Support;Emotional Distress;Difficult news received   Grief, Loss, and Adjustments   Type Adjustment to illness   Palliative Care   Type Palliative Care, Initial/Spiritual Assessment   Assessment/Intervention/Outcome   Assessment Anxious;Concerns with suffering;Despair;Fearful;Powerlessness;Sad;Stress overload;Tearful   Intervention Active listening;Discussed belief system/Jehovah's witness practices/kate;Discussed illness injury and it’s impact;Discussed meaning/purpose;Discussed relationship with God;Explored/Affirmed feelings, thoughts, concerns;Nurtured Hope;Prayer (assurance of)/[place];Sustaining Presence/Ministry of presence;Read/Provided Scripture   Outcome Concerns relieved;Connection/Belonging;Coping;Encouraged;Expressed feelings, needs, and concerns;Less anxious, Less agitated;Restored Hope;Venting emotion   Plan and Referrals   Plan/Referrals Continue Support (comment)

## 2024-10-15 NOTE — PROGRESS NOTES
Outpatient Pharmacy Progress Note for Meds-to-Beds    Total number of Prescriptions Filled: 3    Additional Documentation:  Medication(s) were delivered to the patient's room prior to discharge  Nurse Tracy helped pay for the patient's medications.       Thank you for letting [place] [address]    Phone: [phone]    Fax: [phone]

## 2024-10-15 NOTE — DISCHARGE SUMMARY
V2.0  Discharge Summary    Name:  Honorio Colindres /Age/Sex: 1955 (68 y.o. male)   Admit Date: 10/13/2024  Discharge Date: 10/15/24    MRN & CSN:  [id number] & [id number] Encounter Date and Time 10/15/24 12:19 PM EDT    Attending:  Dereck Meléndez MD Discharging Provider: Dereck Meléndez MD       Hospital Course:     Brief HPI:     Honorio Colindres is a 68 y.o. male with pmh of hypertension, BPH with chronic urinary retention who presents with hematuria.  Patient states that his urologist no longer accepts his insurance so on Tuesday he came to the ER for a Valenzuela catheter change (patient has chronic Valenzuela which he gets changed every month) and he has been having bloody urine ever since.  Patient denies fever, chills, chest pain, shortness of breath, lightheadedness, dizziness, nausea, vomiting, abdominal pain, diarrhea or constipation.  Patient does endorse a headache.  Patient is a non-smoker, no alcohol or drug use     Brief Problem Based Course:     # Gross hematuria, resolved  # BPH with urinary retention s/p chronic Valenzuela  # Complicated UTI  -Patient presented to the ED on 10/8 for Valenzuela catheter exchange, has been having hematuria since then  -Hemoglobin stable  -Continue bladder irrigation as needed  -Urology consulted, appreciate recs.  Patient would need outpatient follow-up with urology.  Does not have an established urologist.  Patient scheduled for follow-up with urology outpatient.  Appreciate CM assistance  -Urine cultures grew Citrobacter and E. coli.  Sensitivities noted.  Patient to complete antibiotics course with cefdinir     # Hypertensive urgency, resolved  -Started on Procardia     # Worsening AAA  - 4.3 cm, was previously 3.6 cm in 2022  -CT surgery was consulted.  No intervention at this time.  Patient to follow-up outpatient    The patient expressed appropriate understanding of, and agreement with the discharge recommendations, medications, and plan.     Consults this

## 2024-10-15 NOTE — PLAN OF CARE
Problem: Discharge Planning  Goal: Discharge to home or other facility with appropriate resources  10/15/2024 0051 by Mark Strauss RN  Outcome: Progressing  10/14/2024 1832 by Ivan Irwin RN  Outcome: Progressing     Problem: Pain  Goal: Verbalizes/displays adequate comfort level or baseline comfort level  10/15/2024 0051 by Mark Strauss RN  Outcome: Progressing  10/14/2024 1832 by Ivan Irwin RN  Outcome: Progressing     Problem: Genitourinary - Adult  Goal: Urinary catheter remains patent  10/15/2024 0051 by Mark Strauss RN  Outcome: Progressing  10/14/2024 1832 by Ivan Irwin RN  Outcome: Progressing     Problem: Infection - Adult  Goal: Absence of infection during hospitalization  10/15/2024 0051 by Mark Strauss RN  Outcome: Progressing  10/14/2024 1832 by Ivan Irwin RN  Outcome: Progressing

## 2024-10-16 PROBLEM — I71.43 INFRARENAL ABDOMINAL AORTIC ANEURYSM (AAA) WITHOUT RUPTURE (HCC): Status: ACTIVE | Noted: 2024-10-16

## 2024-12-06 ENCOUNTER — HOSPITAL ENCOUNTER (EMERGENCY)
Age: 69
Discharge: HOME | End: 2024-12-06
Payer: COMMERCIAL

## 2024-12-06 VITALS
TEMPERATURE: 98.2 F | DIASTOLIC BLOOD PRESSURE: 103 MMHG | OXYGEN SATURATION: 98 % | HEART RATE: 60 BPM | SYSTOLIC BLOOD PRESSURE: 170 MMHG | RESPIRATION RATE: 16 BRPM

## 2024-12-06 DIAGNOSIS — Z46.6 URINARY CATHETER (FOLEY) CHANGE REQUIRED: Primary | ICD-10-CM

## 2024-12-06 DIAGNOSIS — N40.0 BENIGN PROSTATIC HYPERPLASIA, UNSPECIFIED WHETHER LOWER URINARY TRACT SYMPTOMS PRESENT: ICD-10-CM

## 2024-12-06 PROCEDURE — 99283 EMERGENCY DEPT VISIT LOW MDM: CPT

## 2024-12-06 PROCEDURE — 51702 INSERT TEMP BLADDER CATH: CPT

## 2024-12-06 NOTE — ED NOTES
Pt indwelling byrne catheter changed, removed old one and replaced with new 16 FR byrne and 10 ml balloon, noted 100 cc of clear urine with pink tinged blood return, cleaned and applied skin prep and placed byrne amaya to a right thigh, pt requested leg bag, switched from a byrne large bag to a leg bag. Pt tolerated well, educated pt on importance not to pull or tag the byrne to prevent pulling and rupturing with  in line, pt voiced understanding.

## 2024-12-06 NOTE — ED PROVIDER NOTES
[facility] EMERGENCY DEPARTMENT  EMERGENCY DEPARTMENT ENCOUNTER        Pt Name: Honorio Colindres  MRN: [id number]  Birthdate 1955  Date of evaluation: 12/6/2024  Provider: Yunior Rivera PA-C  PCP: No primary care provider on file.  Note Started: 4:08 PM EST 12/6/24      YONI. I have evaluated this patient.        CHIEF COMPLAINT       Chief Complaint   Patient presents with    Catheter needs changed     Pt states that he has a chronic indwelling catheter. Pt states that he had an appt 11/6 and has a f/u appt 12/23 but he was told by his urologist that the catheter needs replaced every month so he came in today to get it replaced because it will be more than a month. Pt is not having any issues with the catheter.        HISTORY OF PRESENT ILLNESS: 1 or more Elements     Honorio Colindres is a 69 y.o. male, [place] Creole speaking requiring the , with chronic urinary retention in the setting of BPH with a chronic Valenzuela catheter who presents without complaints, however requesting Valenzuela catheter exchange.  Denies any burning, denies any fever, chills.  States that it was last changed on November 6 and his follow-up appointment is not until November 23, but was told that he needs it replaced every month, so he came to the emergency department  Nursing Notes were all reviewed and agreed with or any disagreements were addressed in the HPI.    Historians other than the patient: none    Limitations to history : Language Nauruan creole     Social Determinants Significantly Affecting Health : Patient has significant healthcare illiteracy     Records Reviewed (External and Source): Office visit with the urology team from November 6, 2020    PAST MEDICAL HISTORY      has a past medical history of Hypertension.     REVIEW OF SYSTEMS :      Review of Systems    Positives and Pertinent negatives as per HPI.     SURGICAL HISTORY     Past Surgical History:   Procedure Laterality  instructions were discussed.     Escalation of care considered: Outpatient urology visit from November 6, 2024    I am the Primary Clinician of Record.    FINAL IMPRESSION      1. Urinary catheter (Valenzuela) change required    2. Benign prostatic hyperplasia, unspecified whether lower urinary tract symptoms present          DISPOSITION/PLAN     DISPOSITION Decision To Discharge 12/06/2024 05:06:47 PM   DISPOSITION CONDITION Stable           CONSULTS: (Who and What was discussed)  None  Unless otherwise noted, none      CRITICAL CARE TIME (.cctime)   None    PATIENT REFERRED TO:  Albert Cheng, RACHEL - AISHWARYA  [address] [place] [id number] [phone]      call and schedule an appointment for this ER visit      DISCHARGE MEDICATIONS:  New Prescriptions    No medications on file       DISCONTINUED MEDICATIONS:  Discontinued Medications    No medications on file              (Please note that portions of this note were completed with a voice recognition program.  Efforts were made to edit the dictations but occasionally words are mis-transcribed.)    Yunior Rivera PA-C (electronically signed)       Yunior Rivera PA-C  12/06/24 4925

## 2025-01-24 NOTE — H&P
History and Physical      Name:  Nghia Rodarte /Age/Sex: 1955  (77 y.o. male)   MRN & CSN:  [id number] & [id number] Encounter Date/Time: 2022 11:33 PM EDT   Location:  [place] [address] PCP: No primary care provider on file. Hospital Day: 1    Assessment and Plan:   Nghia Rodarte is a 78 y.o. male who presents with     Bright red blood per rectum likely secondary to lower GI bleed  HD stable  Q. 6 CBCs  2 large-bore IVs  Protonix  GI consult  Hold anticoagulation    ? H/o bladder cancer  H/o AAA, 3.2 centimeters      D/w ED provider  Code status Full  DVT PPx SCD      History of Present Illness:     Nghia Rodarte is a 78 y.o. male p/w bright red blood per rectum this been going on for the last month, states that he had a couple weeks ago, then resolved, however has been having it for now about a week now. Patient states he has been having some abdominal cramping associated blood per rectum, denies any associated fevers, however has had some diarrhea which has been changing with diarrhea and constipation. Patient denies any lightheadedness/dizziness, chest pain or shortness of breath. Patient has no associated fevers. He states his never had bleeding before, denies any weight changes as well. Of note patient is [place] speaking only. Review of Systems: Need 10 Elements       Pt otherwise has no complaints of CP, SOB, dizziness, dysuria, joint pains, rash/boils, cough or fevers. Objective:   No intake or output data in the 24 hours ending 22 2333   Vitals:   Vitals:    22 2040 22 2100 22 2245   BP: (!) 186/114 (!) 174/116 (!) 183/98   Pulse: 66     Resp: 24     Temp: 98.7 °F (37.1 °C)     TempSrc: Oral     SpO2: 99% 99% 99%   Weight: 160 lb (72.6 kg)     Height: 5' 5\" (1.651 m)         Medications Prior to Admission     Prior to Admission medications    Medication Sig Start Date End Date Taking?  Authorizing Provider   tamsulosin (FLOMAX) 0.4 MG capsule Take 1 capsule by mouth daily 11/2/20   J Carlos Crandall MD   metoprolol tartrate (LOPRESSOR) 25 MG tablet Take 1 tablet by mouth 2 times daily 11/1/20   J Carlos Crandall MD       Physical Exam: Need 8 Elements   General: NAD   Eyes: EOMI  ENT: neck supple  Cardiovascular: Regular rate. NO edema  Respiratory: Symmetrical expansion, CTA  Gastrointestinal: Soft, non tender, positive bowel sounds, bright red blood in commode baseline, no active clots  Genitourinary: no suprapubic tenderness  Skin: warm, dry  Neuro: Alert. Oriented  Psych: Mood appropriate. Past Medical History:   PMHx   Past Medical History:   Diagnosis Date    Hypertension      PSHX:  has no past surgical history on file. Allergies: No Known Allergies  Fam HX:  family history is not on file. Soc HX:   Social History     Socioeconomic History    Marital status: Unknown     Spouse name: None    Number of children: None    Years of education: None    Highest education level: None   Occupational History    None   Tobacco Use    Smoking status: Never Smoker    Smokeless tobacco: Never Used   Substance and Sexual Activity    Alcohol use: Not Currently    Drug use: Never    Sexual activity: None   Other Topics Concern    None   Social History Narrative    None     Social Determinants of Health     Financial Resource Strain:     Difficulty of Paying Living Expenses: Not on file   Food Insecurity:     Worried About Running Out of Food in the Last Year: Not on file    Henri of Food in the Last Year: Not on file   Transportation Needs:     Lack of Transportation (Medical): Not on file    Lack of Transportation (Non-Medical):  Not on file   Physical Activity:     Days of Exercise per Week: Not on file    Minutes of Exercise per Session: Not on file   Stress:     Feeling of Stress : Not on file   Social Connections:     Frequency of Communication with Friends and Family: Not on file    Frequency of Social Gatherings with Friends and Family: Not on file    Attends [employer] Services: Not on file    Active Member of Clubs or Organizations: Not on file    Attends Club or Organization Meetings: Not on file    Marital Status: Not on file   Intimate Partner Violence:     Fear of Current or Ex-Partner: Not on file    Emotionally Abused: Not on file    Physically Abused: Not on file    Sexually Abused: Not on file   Housing Stability:     Unable to Pay for Housing in the Last Year: Not on file    Number of [place] in the Last Year: Not on file    Unstable Housing in the Last Year: Not on file       Medications:   Medications:    Infusions:   PRN Meds:     Labs      CBC:   Recent Labs     06/20/22 2126   WBC 4.3   HGB 12.6*        BMP:    Recent Labs     06/20/22 2126      K 4.2      CO2 24   BUN 16   CREATININE 1.1   GLUCOSE 101*     Hepatic:   Recent Labs     06/20/22 2126   AST 26   ALT 13   BILITOT 0.1   ALKPHOS 97     Lipids: No results found for: CHOL, HDL, TRIG  Hemoglobin A1C: No results found for: LABA1C  TSH: No results found for: TSH  Troponin: No results found for: TROPONINT  Lactic Acid: No results for input(s): LACTA in the last 72 hours. BNP: No results for input(s): PROBNP in the last 72 hours. UA:  Lab Results   Component Value Date    NITRU NEGATIVE 10/31/2020    COLORU RED 10/31/2020    WBCUA 452 10/31/2020    RBCUA 826 10/31/2020    MUCUS RARE 10/31/2020    TRICHOMONAS NONE SEEN 10/31/2020    BACTERIA FEW 10/31/2020    CLARITYU SLIGHTLY CLOUDY 10/31/2020    SPECGRAV 1.010 10/31/2020    LEUKOCYTESUR LARGE 10/31/2020    UROBILINOGEN NORMAL 10/31/2020    BILIRUBINUR NEGATIVE 10/31/2020    BLOODU LARGE 10/31/2020    KETUA NEGATIVE 10/31/2020     Urine Cultures: No results found for: LABURIN  Blood Cultures: No results found for: BC  No results found for: BLOODCULT2  Organism: No results found for: ORG    Imaging/Diagnostics Last 24 Hours   No results found.         Electronically signed by Margarita Blue Cuong Sol MD on 6/20/2022 at 11:33 PM Detail Level: Generalized Detail Level: Detailed

## 2025-04-10 ENCOUNTER — TELEPHONE (OUTPATIENT)
Dept: CARDIOTHORACIC SURGERY | Age: 70
End: 2025-04-10

## 2025-04-11 ENCOUNTER — TELEPHONE (OUTPATIENT)
Dept: CARDIOTHORACIC SURGERY | Age: 70
End: 2025-04-11

## 2025-04-15 ENCOUNTER — TELEPHONE (OUTPATIENT)
Dept: CARDIOTHORACIC SURGERY | Age: 70
End: 2025-04-15

## 2025-07-20 ENCOUNTER — APPOINTMENT (OUTPATIENT)
Dept: CT IMAGING | Age: 70
DRG: 065 | End: 2025-07-20
Payer: COMMERCIAL

## 2025-07-20 ENCOUNTER — HOSPITAL ENCOUNTER (INPATIENT)
Age: 70
LOS: 5 days | Discharge: HOME | DRG: 065 | End: 2025-07-25
Attending: EMERGENCY MEDICINE | Admitting: STUDENT IN AN ORGANIZED HEALTH CARE EDUCATION/TRAINING PROGRAM
Payer: COMMERCIAL

## 2025-07-20 DIAGNOSIS — R29.898 LEFT HAND WEAKNESS: ICD-10-CM

## 2025-07-20 DIAGNOSIS — G45.9 TIA (TRANSIENT ISCHEMIC ATTACK): Primary | ICD-10-CM

## 2025-07-20 DIAGNOSIS — I10 UNCONTROLLED HYPERTENSION: ICD-10-CM

## 2025-07-20 DIAGNOSIS — I51.89 MASS OF LEFT CARDIAC VENTRICLE: ICD-10-CM

## 2025-07-20 DIAGNOSIS — I63.411 CEREBROVASCULAR ACCIDENT (CVA) DUE TO EMBOLISM OF RIGHT MIDDLE CEREBRAL ARTERY (HCC): ICD-10-CM

## 2025-07-20 DIAGNOSIS — R93.0 ABNORMAL HEAD CT: ICD-10-CM

## 2025-07-20 DIAGNOSIS — I61.9 CVA (CEREBROVASCULAR ACCIDENT DUE TO INTRACEREBRAL HEMORRHAGE) (HCC): ICD-10-CM

## 2025-07-20 LAB
ALBUMIN SERPL-MCNC: 4.2 G/DL (ref 3.4–5)
ALBUMIN/GLOB SERPL: 1.2 {RATIO} (ref 1.1–2.2)
ALP SERPL-CCNC: 83 U/L (ref 40–129)
ALT SERPL-CCNC: 16 U/L (ref 10–40)
ANION GAP SERPL CALCULATED.3IONS-SCNC: 10 MMOL/L (ref 9–17)
AST SERPL-CCNC: 34 U/L (ref 15–37)
BASOPHILS # BLD: 0.03 K/UL
BASOPHILS NFR BLD: 1 % (ref 0–1)
BILIRUB SERPL-MCNC: 0.5 MG/DL (ref 0–1)
BUN SERPL-MCNC: 23 MG/DL (ref 7–20)
CALCIUM SERPL-MCNC: 9.7 MG/DL (ref 8.3–10.6)
CHLORIDE SERPL-SCNC: 110 MMOL/L (ref 99–110)
CHP ED QC CHECK: YES
CO2 SERPL-SCNC: 24 MMOL/L (ref 21–32)
CREAT SERPL-MCNC: 1.2 MG/DL (ref 0.8–1.3)
EOSINOPHIL # BLD: 0.03 K/UL
EOSINOPHILS RELATIVE PERCENT: 1 % (ref 0–3)
ERYTHROCYTE [DISTWIDTH] IN BLOOD BY AUTOMATED COUNT: 14.5 % (ref 11.7–14.9)
GFR, ESTIMATED: 60 ML/MIN/1.73M2
GLUCOSE BLD-MCNC: 112 MG/DL
GLUCOSE BLD-MCNC: 112 MG/DL (ref 74–99)
GLUCOSE SERPL-MCNC: 193 MG/DL (ref 74–99)
HCT VFR BLD AUTO: 41.7 % (ref 42–52)
HGB BLD-MCNC: 12.9 G/DL (ref 13.5–18)
IMM GRANULOCYTES # BLD AUTO: 0.01 K/UL
IMM GRANULOCYTES NFR BLD: 0 %
INR PPP: 1
LYMPHOCYTES NFR BLD: 1.16 K/UL
LYMPHOCYTES RELATIVE PERCENT: 27 % (ref 24–44)
MCH RBC QN AUTO: 27.1 PG (ref 27–31)
MCHC RBC AUTO-ENTMCNC: 30.9 G/DL (ref 32–36)
MCV RBC AUTO: 87.6 FL (ref 78–100)
MONOCYTES NFR BLD: 0.32 K/UL
MONOCYTES NFR BLD: 7 % (ref 0–5)
NEUTROPHILS NFR BLD: 65 % (ref 36–66)
NEUTS SEG NFR BLD: 2.82 K/UL
PLATELET # BLD AUTO: 246 K/UL (ref 140–440)
PMV BLD AUTO: 9.1 FL (ref 7.5–11.1)
POTASSIUM SERPL-SCNC: 3.7 MMOL/L (ref 3.5–5.1)
PROT SERPL-MCNC: 7.8 G/DL (ref 6.4–8.2)
PROTHROMBIN TIME: 13.9 SEC (ref 11.7–14.5)
RBC # BLD AUTO: 4.76 M/UL (ref 4.6–6.2)
SODIUM SERPL-SCNC: 145 MMOL/L (ref 136–145)
TROPONIN I SERPL HS-MCNC: 37 NG/L (ref 0–22)
WBC OTHER # BLD: 4.4 K/UL (ref 4–10.5)

## 2025-07-20 PROCEDURE — 80053 COMPREHEN METABOLIC PANEL: CPT

## 2025-07-20 PROCEDURE — 85025 COMPLETE CBC W/AUTO DIFF WBC: CPT

## 2025-07-20 PROCEDURE — 6360000002 HC RX W HCPCS: Performed by: STUDENT IN AN ORGANIZED HEALTH CARE EDUCATION/TRAINING PROGRAM

## 2025-07-20 PROCEDURE — 99285 EMERGENCY DEPT VISIT HI MDM: CPT

## 2025-07-20 PROCEDURE — 6360000004 HC RX CONTRAST MEDICATION: Performed by: EMERGENCY MEDICINE

## 2025-07-20 PROCEDURE — 70498 CT ANGIOGRAPHY NECK: CPT

## 2025-07-20 PROCEDURE — 6370000000 HC RX 637 (ALT 250 FOR IP): Performed by: EMERGENCY MEDICINE

## 2025-07-20 PROCEDURE — 2500000003 HC RX 250 WO HCPCS: Performed by: STUDENT IN AN ORGANIZED HEALTH CARE EDUCATION/TRAINING PROGRAM

## 2025-07-20 PROCEDURE — 93005 ELECTROCARDIOGRAM TRACING: CPT

## 2025-07-20 PROCEDURE — 82962 GLUCOSE BLOOD TEST: CPT

## 2025-07-20 PROCEDURE — 84484 ASSAY OF TROPONIN QUANT: CPT

## 2025-07-20 PROCEDURE — 85610 PROTHROMBIN TIME: CPT

## 2025-07-20 PROCEDURE — 70450 CT HEAD/BRAIN W/O DYE: CPT

## 2025-07-20 PROCEDURE — 1200000000 HC SEMI PRIVATE

## 2025-07-20 PROCEDURE — 6370000000 HC RX 637 (ALT 250 FOR IP): Performed by: STUDENT IN AN ORGANIZED HEALTH CARE EDUCATION/TRAINING PROGRAM

## 2025-07-20 RX ORDER — ASPIRIN 300 MG/1
300 SUPPOSITORY RECTAL DAILY
Status: DISCONTINUED | OUTPATIENT
Start: 2025-07-21 | End: 2025-07-25 | Stop reason: HOSPADM

## 2025-07-20 RX ORDER — ENOXAPARIN SODIUM 100 MG/ML
40 INJECTION SUBCUTANEOUS DAILY
Status: DISCONTINUED | OUTPATIENT
Start: 2025-07-20 | End: 2025-07-24

## 2025-07-20 RX ORDER — ASPIRIN 81 MG/1
324 TABLET, CHEWABLE ORAL ONCE
Status: COMPLETED | OUTPATIENT
Start: 2025-07-20 | End: 2025-07-20

## 2025-07-20 RX ORDER — SODIUM CHLORIDE 0.9 % (FLUSH) 0.9 %
5-40 SYRINGE (ML) INJECTION PRN
Status: DISCONTINUED | OUTPATIENT
Start: 2025-07-20 | End: 2025-07-25 | Stop reason: HOSPADM

## 2025-07-20 RX ORDER — IOPAMIDOL 755 MG/ML
75 INJECTION, SOLUTION INTRAVASCULAR
Status: COMPLETED | OUTPATIENT
Start: 2025-07-20 | End: 2025-07-20

## 2025-07-20 RX ORDER — ATORVASTATIN CALCIUM 40 MG/1
80 TABLET, FILM COATED ORAL NIGHTLY
Status: DISCONTINUED | OUTPATIENT
Start: 2025-07-20 | End: 2025-07-21

## 2025-07-20 RX ORDER — POLYETHYLENE GLYCOL 3350 17 G/17G
17 POWDER, FOR SOLUTION ORAL DAILY PRN
Status: DISCONTINUED | OUTPATIENT
Start: 2025-07-20 | End: 2025-07-25 | Stop reason: HOSPADM

## 2025-07-20 RX ORDER — ONDANSETRON 2 MG/ML
4 INJECTION INTRAMUSCULAR; INTRAVENOUS EVERY 6 HOURS PRN
Status: DISCONTINUED | OUTPATIENT
Start: 2025-07-20 | End: 2025-07-25 | Stop reason: HOSPADM

## 2025-07-20 RX ORDER — ASPIRIN 81 MG/1
81 TABLET, CHEWABLE ORAL DAILY
Status: DISCONTINUED | OUTPATIENT
Start: 2025-07-21 | End: 2025-07-25 | Stop reason: HOSPADM

## 2025-07-20 RX ORDER — ONDANSETRON 4 MG/1
4 TABLET, ORALLY DISINTEGRATING ORAL EVERY 8 HOURS PRN
Status: DISCONTINUED | OUTPATIENT
Start: 2025-07-20 | End: 2025-07-25 | Stop reason: HOSPADM

## 2025-07-20 RX ORDER — ATORVASTATIN CALCIUM 40 MG/1
80 TABLET, FILM COATED ORAL ONCE
Status: COMPLETED | OUTPATIENT
Start: 2025-07-20 | End: 2025-07-20

## 2025-07-20 RX ORDER — SODIUM CHLORIDE 0.9 % (FLUSH) 0.9 %
5-40 SYRINGE (ML) INJECTION EVERY 12 HOURS SCHEDULED
Status: DISCONTINUED | OUTPATIENT
Start: 2025-07-20 | End: 2025-07-25 | Stop reason: HOSPADM

## 2025-07-20 RX ORDER — SODIUM CHLORIDE 9 MG/ML
INJECTION, SOLUTION INTRAVENOUS PRN
Status: DISCONTINUED | OUTPATIENT
Start: 2025-07-20 | End: 2025-07-25 | Stop reason: HOSPADM

## 2025-07-20 RX ADMIN — ATORVASTATIN CALCIUM 80 MG: 40 TABLET, FILM COATED ORAL at 22:20

## 2025-07-20 RX ADMIN — ASPIRIN 81 MG 324 MG: 81 TABLET ORAL at 15:25

## 2025-07-20 RX ADMIN — ENOXAPARIN SODIUM 40 MG: 100 INJECTION SUBCUTANEOUS at 17:56

## 2025-07-20 RX ADMIN — SODIUM CHLORIDE, PRESERVATIVE FREE 10 ML: 5 INJECTION INTRAVENOUS at 22:20

## 2025-07-20 RX ADMIN — IOPAMIDOL 75 ML: 755 INJECTION, SOLUTION INTRAVENOUS at 14:42

## 2025-07-20 RX ADMIN — ATORVASTATIN CALCIUM 80 MG: 40 TABLET, FILM COATED ORAL at 15:26

## 2025-07-20 ASSESSMENT — LIFESTYLE VARIABLES
HOW MANY STANDARD DRINKS CONTAINING ALCOHOL DO YOU HAVE ON A TYPICAL DAY: PATIENT DOES NOT DRINK
HOW OFTEN DO YOU HAVE A DRINK CONTAINING ALCOHOL: NEVER

## 2025-07-20 NOTE — H&P
imaging study corresponding to the same anatomical region is available. TECHNIQUE: Helical images were obtained in the axial plane during the rapid administration of intravenous contrast. The exam was timed to best evaluate and opacify the arterial structures. The examination is reviewed at  soft tissue, and lung windows. 2D and 3D reconstructions are performed of the target arterial  structures. CT radiation dose optimization techniques (automated exposure control, use of iterative reconstruction techniques, or adjustment of the mA or kV according to patient size) were used to limit patient radiation dose. Limitations: None. FINDINGS: Soft Tissues: No significant soft tissue findings in the neck or upper chest. Bones: Degenerative changes of the cervical spine are seen. There are multiple missing teeth. Multiple teeth demonstrate periapical lucencies most suggestive of periapical abscesses. Paranasal sinuses: There is near-complete opacification of the right sphenoid sinus. There is mild mucosal thickening of the maxillary sinuses. The mastoid air cells are clear. Upper Lungs:  Artifact limiting evaluation from the contrast bolus, no gross acute abnormality in the imaged lung apices. Aortic Arch: The imaged ascending thoracic aorta is mildly aneurysmal measuring approximately  4 cm in diameter. There is classic three-vessel branching anatomy off of the aortic arch. The major branching vessels are tortuous without significant stenosis. Intracranial Vasculature:  The vertebrobasilar junction is intact. The basilar artery is patent. There is a fetal type origin of the right posterior cerebral artery. The posterior cerebral arteries are patent. There are atherosclerotic calcifications of the intracranial portions of the internal carotid arteries without significant stenosis. The middle cerebral arteries are patent. The anterior cerebral arteries are patent. Right Carotid: Patent. No acute dissection. Mild atheromatous

## 2025-07-20 NOTE — ED TRIAGE NOTES
Pt arrived to ED with c/o uneasiness since this AM. Pt stating they are experiencing shaking to their left hand. A/o and on room air. Ambulatory.

## 2025-07-20 NOTE — CONSULTS
Session ID: [id number]  Session Duration: 15 minutes  Language: Amanda Quintanilla   ID: #[id number]   Name: Kaykay

## 2025-07-20 NOTE — ED PROVIDER NOTES
[facility]    Emergency Department Encounter      Patient: Honorio Colindres  MRN: [id number]  : 1955  Date of Evaluation: 2025  ED Provider:  Nubia Gramajo DO    Triage Chief Complaint:   Anxiety (Feeling uneasy ) and Shaking (Left hand )    [place] IRA:  Honorio Colindres is a 69 y.o. male who  has a past medical history of Hypertension. and presents with   Woke up this morning, left arm shaking, put hand in pocket and almost fell down.    Not otherwise sick.  No chest pain, SOB, vomiting, diarrhea, blood in stool or dark, tarry stool,     Anxious, doesn't feel normal.  Feel his arms, feels like he has two hands in his pocket.  Weakness in left hand.  Noticed this this morning at 11 or 11:30 am.  He dropped his water and his phone.  Normal before that.    Left leg felt a little cramp in the leg.  It was not weak.  If I hold something in the left hand, it will fall down for no reason.  No prior hx.    He has been out of his blood pressure medication for 3 days.    Citizen of the [place] Creole,  used.    ROS - see HPI, below listed is current ROS at time of my eval:   systems reviewed and negative except as above.     Past Medical History:   Diagnosis Date    Hypertension      Past Surgical History:   Procedure Laterality Date    ABDOMEN SURGERY      COLONOSCOPY N/A 2022    COLONOSCOPY DIAGNOSTIC performed by Nahun Lovell MD at [facility] ENDOSCOPY     No family history on file.  Social History     Socioeconomic History    Marital status: Legally      Spouse name: Not on file    Number of children: Not on file    Years of education: Not on file    Highest education level: Not on file   Occupational History    Not on file   Tobacco Use    Smoking status: Never    Smokeless tobacco: Never   Substance and Sexual Activity    Alcohol use: Not Currently    Drug use: Never    Sexual activity: Not on file   Other Topics Concern    Not on file   Social History Narrative    Not on file     Social  his phone today.  had mildly weaker  on left hand than right but no drift.  he reported to me that his LKW was 11:30 today.  stroke alert called.  radiology called me and said subacute findings on CT that coorelate with symptoms.  [facility] evaluated and did not think that the patient would be a TNK candidate since he did not find abnormal exam.  recommended aspirin 325 and lipitor 80. hospitalize for MRI and further evaluation and treatment.  patient has been out of his blood pressure medation and was hypertensive on arrival.  CTA pending. [CB]   [address] Amade[address] Reese [CB]      ED Course User Index  [CB] Nubia Gramajo DO       Patient was given the following medications:  Medications   iopamidol (ISOVUE-370) 76 % injection 75 mL (75 mLs IntraVENous Given 7/20/25 1442)   aspirin chewable tablet 324 mg (324 mg Oral Given 7/20/25 1525)   atorvastatin (LIPITOR) tablet 80 mg (80 mg Oral Given 7/20/25 1526)       Discussion with Other Profesionals : Admitting Team  , Consultant [facility] neurology , and Radiologist      History from : Patient    Limitations to history : Language [place] Creole      EKG (if obtained): (All EKG's are interpreted by myself in the absence of a cardiologist)   EKG Interpretation    Interpreted by emergency department physician    Rhythm: normal sinus   Rate: normal  Axis: normal  Ectopy: none  Conduction: nonspecific interventricular conduction block  ST Segments: nonspecific changes  T Waves: non specific changes  Q Waves: nonspecific    Clinical Impression: NSR, HR 63, NICD, nonspecific ST    Nubia Gramajo DO      Chronic conditions affecting care: Honorio Colindres  has a past medical history of Hypertension.  Patient’s care impacted by chronic condition: hypertension How it impacts care: ran out of medication     Social Determinants : Patient has significant healthcare illiteracy. Additional time provided in explanations.    Records Reviewed : Source BPH with retention, ablation during cystoscopy

## 2025-07-20 NOTE — ED NOTES
ED TO INPATIENT SBAR HANDOFF    Patient Name: Honorio Colindres   :  1955  69 y.o.   Preferred Name  Honorio  Family/Caregiver Present no   Restraints no   C-SSRS: Risk of Suicide: No Risk  Sitter no   Sepsis Risk Score Sepsis V2 Risk Score: 9.1    PLEASE NOTE--Encounter / Re-Admission Within 30 Days  This patient has had another encounter or admission within the last 30 days.      Readmission Risk Score: 7      Situation  Chief Complaint   Patient presents with    Anxiety     Feeling uneasy     Shaking     Left hand      Brief Description of Patient's Condition: Pt presents to ED for left sided weakness. States he dropped a cup of water at home, left arm felt \"off\". Pt presented to ED for stroke work up.  Mental Status: oriented and alert  Arrived from: home    Imaging:   CTA HEAD NECK W CONTRAST   Final Result      CT HEAD WO CONTRAST   Final Result        Abnormal labs:   Abnormal Labs Reviewed   CBC WITH AUTO DIFFERENTIAL - Abnormal; Notable for the following components:       Result Value    Hemoglobin 12.9 (*)     Hematocrit 41.7 (*)     MCHC 30.9 (*)     Monocytes % 7 (*)     All other components within normal limits   COMPREHENSIVE METABOLIC PANEL W/ REFLEX TO MG FOR LOW K - Abnormal; Notable for the following components:    Glucose 193 (*)     BUN 23 (*)     Est, Glom Filt Rate 60 (*)     All other components within normal limits   TROPONIN - Abnormal; Notable for the following components:    Troponin, High Sensitivity 37 (*)     All other components within normal limits   POCT GLUCOSE - Abnormal; Notable for the following components:    POC Glucose 112 (*)     All other components within normal limits        Background  History:   Past Medical History:   Diagnosis Date    Hypertension        Assessment    Vitals: MEWS Score: 1  Level of Consciousness: Alert (0)   Vitals:    25 1354 25 1445 25 1515   BP: (!) 191/97 (!) 187/109 (!) 204/107   Pulse: 86 82 64   Resp: 18 15 21   Temp:

## 2025-07-21 ENCOUNTER — APPOINTMENT (OUTPATIENT)
Dept: NON INVASIVE DIAGNOSTICS | Age: 70
DRG: 065 | End: 2025-07-21
Payer: COMMERCIAL

## 2025-07-21 ENCOUNTER — APPOINTMENT (OUTPATIENT)
Dept: MRI IMAGING | Age: 70
DRG: 065 | End: 2025-07-21
Payer: COMMERCIAL

## 2025-07-21 PROBLEM — I10 UNCONTROLLED HYPERTENSION: Status: ACTIVE | Noted: 2025-07-21

## 2025-07-21 PROBLEM — R29.898 LEFT HAND WEAKNESS: Status: ACTIVE | Noted: 2025-07-21

## 2025-07-21 PROBLEM — G40.89 OTHER SEIZURES (HCC): Status: ACTIVE | Noted: 2025-07-21

## 2025-07-21 PROBLEM — R93.0 ABNORMAL HEAD CT: Status: ACTIVE | Noted: 2025-07-21

## 2025-07-21 LAB
ANION GAP SERPL CALCULATED.3IONS-SCNC: 12 MMOL/L (ref 9–17)
BUN SERPL-MCNC: 17 MG/DL (ref 7–20)
CALCIUM SERPL-MCNC: 9.2 MG/DL (ref 8.3–10.6)
CHLORIDE SERPL-SCNC: 105 MMOL/L (ref 99–110)
CHOLEST SERPL-MCNC: 170 MG/DL (ref 125–199)
CO2 SERPL-SCNC: 22 MMOL/L (ref 21–32)
CREAT SERPL-MCNC: 1 MG/DL (ref 0.8–1.3)
ERYTHROCYTE [DISTWIDTH] IN BLOOD BY AUTOMATED COUNT: 14.4 % (ref 11.7–14.9)
EST. AVERAGE GLUCOSE BLD GHB EST-MCNC: 121 MG/DL
GFR, ESTIMATED: 71 ML/MIN/1.73M2
GLUCOSE SERPL-MCNC: 90 MG/DL (ref 74–99)
HBA1C MFR BLD: 5.8 % (ref 4.2–6.3)
HCT VFR BLD AUTO: 42.8 % (ref 42–52)
HDLC SERPL-MCNC: 44 MG/DL
HGB BLD-MCNC: 13 G/DL (ref 13.5–18)
LDLC SERPL CALC-MCNC: 110 MG/DL
MAGNESIUM SERPL-MCNC: 2.2 MG/DL (ref 1.8–2.4)
MCH RBC QN AUTO: 27 PG (ref 27–31)
MCHC RBC AUTO-ENTMCNC: 30.4 G/DL (ref 32–36)
MCV RBC AUTO: 88.8 FL (ref 78–100)
PLATELET # BLD AUTO: 239 K/UL (ref 140–440)
PMV BLD AUTO: 9 FL (ref 7.5–11.1)
POTASSIUM SERPL-SCNC: 3.3 MMOL/L (ref 3.5–5.1)
RBC # BLD AUTO: 4.82 M/UL (ref 4.6–6.2)
SODIUM SERPL-SCNC: 138 MMOL/L (ref 136–145)
TRIGL SERPL-MCNC: 80 MG/DL
WBC OTHER # BLD: 4.3 K/UL (ref 4–10.5)

## 2025-07-21 PROCEDURE — 70553 MRI BRAIN STEM W/O & W/DYE: CPT

## 2025-07-21 PROCEDURE — 83036 HEMOGLOBIN GLYCOSYLATED A1C: CPT

## 2025-07-21 PROCEDURE — 97535 SELF CARE MNGMENT TRAINING: CPT

## 2025-07-21 PROCEDURE — 36415 COLL VENOUS BLD VENIPUNCTURE: CPT

## 2025-07-21 PROCEDURE — 95819 EEG AWAKE AND ASLEEP: CPT

## 2025-07-21 PROCEDURE — 92610 EVALUATE SWALLOWING FUNCTION: CPT

## 2025-07-21 PROCEDURE — 97165 OT EVAL LOW COMPLEX 30 MIN: CPT

## 2025-07-21 PROCEDURE — 6370000000 HC RX 637 (ALT 250 FOR IP): Performed by: STUDENT IN AN ORGANIZED HEALTH CARE EDUCATION/TRAINING PROGRAM

## 2025-07-21 PROCEDURE — 6370000000 HC RX 637 (ALT 250 FOR IP): Performed by: NURSE PRACTITIONER

## 2025-07-21 PROCEDURE — 97116 GAIT TRAINING THERAPY: CPT

## 2025-07-21 PROCEDURE — A9579 GAD-BASE MR CONTRAST NOS,1ML: HCPCS | Performed by: NURSE PRACTITIONER

## 2025-07-21 PROCEDURE — 2500000003 HC RX 250 WO HCPCS: Performed by: STUDENT IN AN ORGANIZED HEALTH CARE EDUCATION/TRAINING PROGRAM

## 2025-07-21 PROCEDURE — 99223 1ST HOSP IP/OBS HIGH 75: CPT | Performed by: STUDENT IN AN ORGANIZED HEALTH CARE EDUCATION/TRAINING PROGRAM

## 2025-07-21 PROCEDURE — 83735 ASSAY OF MAGNESIUM: CPT

## 2025-07-21 PROCEDURE — 97161 PT EVAL LOW COMPLEX 20 MIN: CPT

## 2025-07-21 PROCEDURE — 6360000004 HC RX CONTRAST MEDICATION: Performed by: NURSE PRACTITIONER

## 2025-07-21 PROCEDURE — 6360000002 HC RX W HCPCS: Performed by: STUDENT IN AN ORGANIZED HEALTH CARE EDUCATION/TRAINING PROGRAM

## 2025-07-21 PROCEDURE — 80048 BASIC METABOLIC PNL TOTAL CA: CPT

## 2025-07-21 PROCEDURE — 1200000000 HC SEMI PRIVATE

## 2025-07-21 PROCEDURE — 85027 COMPLETE CBC AUTOMATED: CPT

## 2025-07-21 PROCEDURE — 94761 N-INVAS EAR/PLS OXIMETRY MLT: CPT

## 2025-07-21 PROCEDURE — 95816 EEG AWAKE AND DROWSY: CPT | Performed by: STUDENT IN AN ORGANIZED HEALTH CARE EDUCATION/TRAINING PROGRAM

## 2025-07-21 PROCEDURE — 80061 LIPID PANEL: CPT

## 2025-07-21 RX ORDER — ATORVASTATIN CALCIUM 40 MG/1
40 TABLET, FILM COATED ORAL NIGHTLY
Status: DISCONTINUED | OUTPATIENT
Start: 2025-07-21 | End: 2025-07-25 | Stop reason: HOSPADM

## 2025-07-21 RX ORDER — ASPIRIN 81 MG/1
81 TABLET, CHEWABLE ORAL DAILY
Qty: 30 TABLET | Refills: 3 | Status: SHIPPED | OUTPATIENT
Start: 2025-07-22 | End: 2025-07-25 | Stop reason: HOSPADM

## 2025-07-21 RX ORDER — ATORVASTATIN CALCIUM 40 MG/1
40 TABLET, FILM COATED ORAL NIGHTLY
Qty: 30 TABLET | Refills: 3 | Status: SHIPPED | OUTPATIENT
Start: 2025-07-21

## 2025-07-21 RX ORDER — GADOTERIDOL 279.3 MG/ML
10 INJECTION INTRAVENOUS
Status: COMPLETED | OUTPATIENT
Start: 2025-07-21 | End: 2025-07-21

## 2025-07-21 RX ADMIN — ENOXAPARIN SODIUM 40 MG: 100 INJECTION SUBCUTANEOUS at 08:24

## 2025-07-21 RX ADMIN — ASPIRIN 81 MG 81 MG: 81 TABLET ORAL at 08:24

## 2025-07-21 RX ADMIN — SODIUM CHLORIDE, PRESERVATIVE FREE 10 ML: 5 INJECTION INTRAVENOUS at 08:24

## 2025-07-21 RX ADMIN — SODIUM CHLORIDE, PRESERVATIVE FREE 10 ML: 5 INJECTION INTRAVENOUS at 21:12

## 2025-07-21 RX ADMIN — GADOTERIDOL 15 ML: 279.3 INJECTION, SOLUTION INTRAVENOUS at 14:43

## 2025-07-21 RX ADMIN — ATORVASTATIN CALCIUM 40 MG: 40 TABLET, FILM COATED ORAL at 21:12

## 2025-07-21 NOTE — CONSULTS
Session ID: [id number]  Session Duration: 14 minutes  Language: Iranian Crehussain   ID: #[id number]   Name: Bev Pedroza

## 2025-07-21 NOTE — PROGRESS NOTES
Facility/Department: [address]   CLINICAL BEDSIDE SWALLOW EVALUATION    NAME: Honorio Colindres  : 1955  MRN: [id number]    ADMISSION DATE: 2025  ADMITTING DIAGNOSIS: has Clot retention of urine; GIB (gastrointestinal bleeding); Gross hematuria; Infrarenal abdominal aortic aneurysm (AAA) without rupture; and CVA (cerebrovascular accident due to intracerebral hemorrhage) (HCC) on their problem list.  ONSET DATE: this admission     Date of Eval: 2025    Evaluating Therapist: AMBREEN Hurd    Impression: Honorio Colindres was seen today for a clinical bedside swallow evaluation following admission to [facility] with TIA. Head CT on 7/20/15 reveals \"Small area of transcortical hypodensity in the right frontal lobe, and may indicate subacute infarct. Further evaluation with MRI of the head is recommended.\" MRI is pending. Relevant medical hx includes Hypertension.     Honorio Colindres was positioned upright in bed with partially eaten meal tray present for the current visit. Pt agreeable and cooperative. Denies difficulty swallowing. Video interpretation used throughout visit. Natural dentition is limited. Oral mechanism examination reveals no focal deficits. Vocal quality is WFL. Informal speech-language screen is judged to be WFL. Pt denies changes in speech/word-finding.     Honorio Colindres consumed trials of thin liquid via cup/straw, puree, and regular solid during this visit. Pt presents with evidence of a functional oropharyngeal swallow characterized by functional oral acceptance, bolus preparation/mastication, timely a/p transit, complete oral clearance, and suspected timely initiation of the pharyngeal swallow. Pt fed self IND. No overt s/sx of penetration/aspiration noted with any consistency. SLP provided education on recommendation to continue current diet and POC. Pt verbalizes understanding. JIMMY Parson notified.     Recommendations: Recommend Honorio Colindres consume a regular solid diet and thin liquids

## 2025-07-21 NOTE — CONSULTS
Session ID: [id number]  Session Duration: 5 minutes  Language: Seneg[place]e Crehussain   ID: #[id number]   Name: Willy

## 2025-07-21 NOTE — PROGRESS NOTES
Routine inpatient EEG completed.  Epileptologist, Dr. Potter, notified via [employer]. Used interpretor #[id number]      Electronically signed by Ilana Jo on 7/21/2025 at 11:55 AM

## 2025-07-21 NOTE — PLAN OF CARE
Problem: Chronic Conditions and Co-morbidities  Goal: Patient's chronic conditions and co-morbidity symptoms are monitored and maintained or improved  7/21/2025 0020 by Diana Reyes RN  Outcome: Progressing  7/20/2025 1627 by Julianna Pan RN ([place])  Outcome: Progressing     Problem: Discharge Planning  Goal: Discharge to home or other facility with appropriate resources  7/21/2025 0020 by Diana Reyes RN  Outcome: Progressing  7/20/2025 1627 by Julianna Pan RN ([place])  Outcome: Progressing     Problem: Neurosensory - Adult  Goal: Achieves stable or improved neurological status  Outcome: Progressing  Goal: Achieves maximal functionality and self care  Outcome: Progressing     Problem: Cardiovascular - Adult  Goal: Maintains optimal cardiac output and hemodynamic stability  Outcome: Progressing  Goal: Absence of cardiac dysrhythmias or at baseline  Outcome: Progressing     Problem: Musculoskeletal - Adult  Goal: Return mobility to safest level of function  Outcome: Progressing  Goal: Return ADL status to a safe level of function  Outcome: Progressing

## 2025-07-21 NOTE — PROCEDURES
ROUTINE ELECTROENCEPHALOGRAM    Identifying Information:  Name: Honorio Colindres  MRN: [id number]  : 1955  Interpreting Physician: Queenie Graf DO  Referring Provider: Jocelin Lindquist APRN - CNP  Date of EE25  Procedure Location: Inpatient      Clinical History:  Honorio Colindres is a 69 y.o. male with concerns for seizure like activity.     Current Medications:    atorvastatin  40 mg Oral Nightly    sodium chloride flush  5-40 mL IntraVENous 2 times per day    enoxaparin  40 mg SubCUTAneous Daily    aspirin  81 mg Oral Daily    Or    aspirin  300 mg Rectal Daily        Indication:  Rule out seizure/seizure disorder     Technical Summary:  28 channels of EEG were recorded in a digital format on a patient who is reported to be awake and drowsy during the recording. The patient was not sleep deprived prior to the EEG.     The background consists of 9-10 Hz activity in the alpha frequency range.  It is asymmetric being better formed over the left hemisphere, normal voltage and continuous.  Posterior dominant rhythm (PDR) is present and it is reactive to stimulation.      The record was remarkable for the presence of:  Focal slowing in the form of nearly continuous polymorphic delta activity seen over the right centro-parietal head region.     Photic stimulation was performed and did not produce any abnormalities. During the recording stage II sleep  was not seen, but drowsiness did occur.     The EKG lead revealed no rhythm abnormalties.       EEG Interpretation:   The EEG was abnormal due to the presence of:   Focal slowing over the right centro-parietal head region. This is a finding consistent with a focal disturbance of cerebral function and an underlying structural lesion should be considered.  No electrographic seizures or non-convulsive status epilepticus was seen over the entire monitoring period.        Clinical correlation recommended.     Queenie Graf DO   Epileptologist  2025 2:19

## 2025-07-21 NOTE — CONSULTS
Detail Level: Simple Neurology Service Consult Note  [facility]   Patient Name: Honorio Colindres  : 1955        Subjective:   Reason for consult: Stroke like symptoms  69 y.o. [place] Island[place] Creole speaking  male with history of HTN presenting to [facility] yesterday with complaints of left arm shaking and weakness. He reported difficulty holding onto objects. /97 on arrival, patient reports being out of his antihypertensives for 3 days. Stroke alert was called and patient was evaluated by [facility] tele neurology. Intervention was not recommended as patient was out of the therapeutic window.     Chart was reviewed in detail ,patient was seen and evaluated.  service was used during the entirety of the exam. Patient tells me that yesterday he had trembling of the left hand, he did not feel like  he could control it and he was dropping items out of his hand. This lasted for about 30 minutes before fully resolving. He felt an odd sensation in the palm of his left hand only. He also reports feeling off balance and tells me that he had both hands in his pockets and fell. He tells me that he had an uneasy sensation which he has a hard time describing. As noted above, he was hypertensive secondary to being out of his medications. Blood pressure has subsequently improved. Patient feels back to his baseline today. Patient denies any similar episodes or seizure in the past.     Past Medical History:   Diagnosis Date    Hypertension     :   Past Surgical History:   Procedure Laterality Date    ABDOMEN SURGERY      COLONOSCOPY N/A 2022    COLONOSCOPY DIAGNOSTIC performed by Nahun Lovell MD at [facility] ENDOSCOPY     Medications:  Scheduled Meds:   sodium chloride flush  5-40 mL IntraVENous 2 times per day    enoxaparin  40 mg SubCUTAneous Daily    aspirin  81 mg Oral Daily    Or    aspirin  300 mg Rectal Daily    atorvastatin  80 mg Oral Nightly     Continuous Infusions:   sodium chloride    admission that has since normalized.   Left hand tremor and weakness, secondary to acute stroke v TIA in the setting of hypertensive urgency. Given description of symptoms can not exclude partial onset seizure, will obtain routine EEG as well as MRI brain w/wo contrast.   Neuro imaging as above  CT head notes small area of transcortical hypodensity in the right frontal lobe  CTA head and neck with no LVO  MRI brain w/wo contrast pending  Routine EEG has been ordered and demonstrating focal slowing on the right  Do not plan to initiate AED at this time. Will await further workup.   Continue aspirin 81 mg and atorvastatin 40 mg for secondary stroke prevention  , goal < 70 for stroke prevention  Will decrease dose of atorvastatin to 40 mg as patient has not previously been on statin  From neurology standpoint no need for continued permissive HTN as onset of symptoms is > 24 hours ago  Management of HTN per IM  PT/OT as recommended  Further recommendations pending completion of neurodiagnostics.       Follow up: to be determined    Thank you for allowing us to participate in the care of your patient.  If there are any questions regarding evaluation please feel free to contact us.     Jocelin Lindquist, RACHEL - CNP, 7/21/2025     Attending Addendum:    I have discussed this patient with the mid-level provider.  I have personally seen and examined the patient and reviewed the diagnostic testing and any changes in the history or physical exam are documented above.  I agree with the plan of care as documented.  I have evaluated/treated an acute/chronic illness/injury that poses threat to life or bodily function and/or Chronic illness with severe exacerbation, or treatment of side effect in this encounter. I have performed a substantive portion of this shared visit, with more than 50% of the time spent in face-to-face and in direct patient care, including obtaining critical elements of the history, performing a physical exam,

## 2025-07-21 NOTE — CONSULTS
[facility] ACUTE CARE PHYSICAL THERAPY EVALUATION  Honorio Colindres, 1955, 3011/3011-A, 7/21/2025    History  [place]:  The primary encounter diagnosis was TIA (transient ischemic attack). Diagnoses of Left hand weakness, Abnormal head CT, and Uncontrolled hypertension were also pertinent to this visit.  Patient  has a past medical history of Hypertension.  Patient  has a past surgical history that includes Abdomen surgery and Colonoscopy (N/A, 6/23/2022).    Recommendation: home     Subjective:  Patient states:  \"Thank you\"   Pain: denies   Communication with other providers:  RN, co-eval with Mishel MIGUEL   Restrictions: General precautions     Home Setup/Prior level of function  Social/Functional History  Lives With: Alone  Type of Home: House  Home Layout: One level  Home Access: Stairs to enter with rails  Bathroom Equipment: None  Home Equipment: None  Prior Level of Assist for ADLs: Independent  Prior Level of Assist for Homemaking: Independent  Prior Level of Assist for Ambulation: Independent household ambulator, with or without device  Prior Level of Assist for Transfers: Independent  Active : No    Examination of body systems (includes body structures/functions, activity/participation limitations):  Observation:  Supine in bed upon arrival. Cooperative with therapy.   Vision:  WFL  Hearing:  WFL  Cardiopulmonary:  Stable vitals on room air     Musculoskeletal  ROM R/L:  WFL BLEs   Strength R/L:  BLES grossly 4+/5  Neuro:  WFL     Mobility/treatment:   Rolling L/R:  NT   Supine to sit:  independent   Transfers:   Sit to stand: SBA for safety from EOB   Stand to sit: SBA for safety to recliner  Step pivot: SBA for safety without AD   Sitting balance:  indep from EOB static and light dynamic without UE support   Standing balance:  CGA progressing to SBA static without UE support  Gait: ~400ft without AD CGA progressing to SBA. Pt demonstrated a fair and consistent pace with reciprocal

## 2025-07-21 NOTE — CONSULTS
Session ID: [id number]  Session Duration: 18 minutes  Language: Amanda Quintanilla   ID: #[id number]   Name: Nancy

## 2025-07-21 NOTE — PROGRESS NOTES
V2.0  [facility] Hospitalist Progress Note      Name:  Honorio Colindres /Age/Sex: 1955  (69 y.o. male)   MRN & CSN:  [id number] & [id number] Encounter Date/Time: 2025 11:57 AM EDT    [facility]  [facility] PCP: No primary care provider on file.       Hospital Day: 2    Assessment and Plan:   Honorio Colindres is a 69 y.o. male  who presents with CVA (cerebrovascular accident due to intracerebral hemorrhage) (HCC)     Hospital Problems             Last Modified POA     * (Principal) CVA (cerebrovascular accident (HCC) 2025 Yes      Assessment and Plan:  Concerns for CVA versus seizure.  CT head and CT of the head is negative.  NIH of 1 for left arm weakness.  Last known well 11:30 PM.  Aspirin and statin started eurology consultation.  Allow permissive hypertension around 180-200 of SBP for the first 24 hours.  Explained to RN in detail.  After neurology saw the patient MRI order has been changed to MRI with and without contrast along with EEG has been ordered by neurology.  Benign essential hypertension on Procardia and metoprolol at home which was on hold because of permissive hypertension for the first 24 hours now it can be continued  BPH on finasteride    Medical Decision Making:  The following items were considered in medical decision making:  Discussion of patient care with other providers  Reviewed clinical lab tests if any  Reviewed radiology tests if any  Reviewed other diagnostic tests/interventions  Independent review of radiologic images if any  Microbiology cultures and other micro tests if any    Estimated time spent for medical decision-making encompassing complexity of the case, history taking, medication review, physical examination, communication with family, RN, , discussion with specialists, and ancillary staff members to provide accurate care for the patient was around 25 minutes.    MDM (any 2 required for High level billing)     A. Problems (any 1)  [] Acute/Chronic

## 2025-07-21 NOTE — PROGRESS NOTES
cognitive status: WNL  Affect: Normal        Mobility:  Supine to sit:  Independent  Transfers: Supervision  Sitting balance:  Independent.    Standing balance:  Supervision.  Functional Mobility Supervision (See PT notes for gait details)  Toilet/Shower Transfers: DNT             AM-PAC Daily Activity - Inpatient   How much help is needed for putting on and taking off regular lower body clothing?: None  How much help is needed for bathing (which includes washing, rinsing, drying)?: None  How much help is needed for toileting (which includes using toilet, bedpan, or urinal)?: None  How much help is needed for putting on and taking off regular upper body clothing?: None  How much help is needed for taking care of personal grooming?: None  How much help for eating meals?: None  AM-PAC Inpatient Daily Activity Raw Score: 24  AM-PAC Inpatient ADL T-Scale Score : 57.54  ADL Inpatient CMS 0-100% Score: 0  ADL Inpatient CMS G-Code Modifier : CH    Treatment:  Self Care Training:   Cues were given for safety, sequence, UE/LE placement, visual cues, and balance.    Activities performed today included grooming, LB dressing     Safety: patient left in chair with chair alarm, call light within reach, RN notified, gait belt used.    Assessment:  Pt is a 70 yo male admitted from home for CVA. Pt at baseline is Independent for ADLs Independent for high level IADLs and Independent for functional transfers/mobility w/ AD. Pt currently presents w/ deficits in ADL and high level IADL independence, functional activity tolerance, dynamic sitting and standing balance and tolerance and functional transfers and LUE strength. Pt is near baseline Independent level and is safe to return home when medically appropriate  Complexity: Low  Prognosis: Good, no significant barriers to participation at this time.   Occupational Therapy Plan  Times Per Week: eval and discharge     Equipment: defer        Recommendations for NURSING activity: Up to  chair for all 3 meals and up to standard commode for all toileting needs    Time:   Time in: 1302  Time out: 1318  Timed treatment minutes: 8  Total time: 16 minutes    Electronically signed by:    Mishel Cedillo/L [id number]  2:05 PM,7/21/2025

## 2025-07-21 NOTE — CONSULTS
Session ID: [id number]  Session Duration: 10 minutes  Language: Tanzanian Crehussain   ID: #[id number]   Name: Dejon

## 2025-07-21 NOTE — CONSULTS
Session ID: [id number]  Session Duration: 9 minutes  Language: Zambian Creole   ID: #[id number]   Name: Cady

## 2025-07-21 NOTE — DISCHARGE INSTR - DIET

## 2025-07-22 ENCOUNTER — APPOINTMENT (OUTPATIENT)
Dept: NON INVASIVE DIAGNOSTICS | Age: 70
DRG: 065 | End: 2025-07-22
Payer: COMMERCIAL

## 2025-07-22 PROBLEM — I63.411 CEREBROVASCULAR ACCIDENT (CVA) DUE TO EMBOLISM OF RIGHT MIDDLE CEREBRAL ARTERY (HCC): Status: ACTIVE | Noted: 2025-07-22

## 2025-07-22 LAB
ANION GAP SERPL CALCULATED.3IONS-SCNC: 9 MMOL/L (ref 9–17)
BUN SERPL-MCNC: 15 MG/DL (ref 7–20)
CALCIUM SERPL-MCNC: 8.9 MG/DL (ref 8.3–10.6)
CHLORIDE SERPL-SCNC: 104 MMOL/L (ref 99–110)
CO2 SERPL-SCNC: 25 MMOL/L (ref 21–32)
CREAT SERPL-MCNC: 1.2 MG/DL (ref 0.8–1.3)
ECHO AO ROOT DIAM: 3.5 CM
ECHO AO ROOT INDEX: 1.92 CM/M2
ECHO AV AREA PEAK VELOCITY: 2.8 CM2
ECHO AV AREA VTI: 2.4 CM2
ECHO AV AREA/BSA PEAK VELOCITY: 1.5 CM2/M2
ECHO AV AREA/BSA VTI: 1.3 CM2/M2
ECHO AV MEAN GRADIENT: 6 MMHG
ECHO AV MEAN VELOCITY: 1.2 M/S
ECHO AV PEAK GRADIENT: 11 MMHG
ECHO AV PEAK VELOCITY: 1.6 M/S
ECHO AV VELOCITY RATIO: 0.69
ECHO AV VTI: 31.8 CM
ECHO BSA: 1.85 M2
ECHO EST RA PRESSURE: 3 MMHG
ECHO IVC PROX: 1.7 CM
ECHO LA AREA 4C: 24.3 CM2
ECHO LA DIAMETER INDEX: 2.03 CM/M2
ECHO LA DIAMETER: 3.7 CM
ECHO LA MAJOR AXIS: 6.5 CM
ECHO LA TO AORTIC ROOT RATIO: 1.06
ECHO LA VOL MOD A4C: 73 ML (ref 18–58)
ECHO LA VOLUME INDEX MOD A4C: 40 ML/M2 (ref 16–34)
ECHO LV E' LATERAL VELOCITY: 9.8 CM/S
ECHO LV E' SEPTAL VELOCITY: 5.3 CM/S
ECHO LV EDV A4C: 116 ML
ECHO LV EDV INDEX A4C: 64 ML/M2
ECHO LV EF PHYSICIAN: 50 %
ECHO LV EJECTION FRACTION A4C: 43 %
ECHO LV ESV A4C: 66 ML
ECHO LV ESV INDEX A4C: 36 ML/M2
ECHO LV FRACTIONAL SHORTENING: 18 % (ref 28–44)
ECHO LV INTERNAL DIMENSION DIASTOLE INDEX: 2.42 CM/M2
ECHO LV INTERNAL DIMENSION DIASTOLIC: 4.4 CM (ref 4.2–5.9)
ECHO LV INTERNAL DIMENSION SYSTOLIC INDEX: 1.98 CM/M2
ECHO LV INTERNAL DIMENSION SYSTOLIC: 3.6 CM
ECHO LV IVSD: 1.2 CM (ref 0.6–1)
ECHO LV MASS 2D: 203 G (ref 88–224)
ECHO LV MASS INDEX 2D: 111.6 G/M2 (ref 49–115)
ECHO LV POSTERIOR WALL DIASTOLIC: 1.3 CM (ref 0.6–1)
ECHO LV RELATIVE WALL THICKNESS RATIO: 0.59
ECHO LVOT AREA: 4.2 CM2
ECHO LVOT AV VTI INDEX: 0.58
ECHO LVOT DIAM: 2.3 CM
ECHO LVOT MEAN GRADIENT: 3 MMHG
ECHO LVOT PEAK GRADIENT: 5 MMHG
ECHO LVOT PEAK VELOCITY: 1.1 M/S
ECHO LVOT STROKE VOLUME INDEX: 42 ML/M2
ECHO LVOT SV: 76.4 ML
ECHO LVOT VTI: 18.4 CM
ECHO MV A VELOCITY: 0.83 M/S
ECHO MV E VELOCITY: 0.7 M/S
ECHO MV E/A RATIO: 0.84
ECHO MV E/E' LATERAL: 7.14
ECHO MV E/E' RATIO (AVERAGED): 10.18
ECHO MV E/E' SEPTAL: 13.21
ECHO RIGHT VENTRICULAR SYSTOLIC PRESSURE (RVSP): 13 MMHG
ECHO RV MID DIMENSION: 2.4 CM
ECHO TV REGURGITANT MAX VELOCITY: 1.56 M/S
ECHO TV REGURGITANT PEAK GRADIENT: 10 MMHG
GFR, ESTIMATED: 62 ML/MIN/1.73M2
GLUCOSE SERPL-MCNC: 94 MG/DL (ref 74–99)
POTASSIUM SERPL-SCNC: 3.6 MMOL/L (ref 3.5–5.1)
SODIUM SERPL-SCNC: 138 MMOL/L (ref 136–145)

## 2025-07-22 PROCEDURE — 99233 SBSQ HOSP IP/OBS HIGH 50: CPT | Performed by: NURSE PRACTITIONER

## 2025-07-22 PROCEDURE — 80048 BASIC METABOLIC PNL TOTAL CA: CPT

## 2025-07-22 PROCEDURE — 2500000003 HC RX 250 WO HCPCS: Performed by: STUDENT IN AN ORGANIZED HEALTH CARE EDUCATION/TRAINING PROGRAM

## 2025-07-22 PROCEDURE — 36415 COLL VENOUS BLD VENIPUNCTURE: CPT

## 2025-07-22 PROCEDURE — 93306 TTE W/DOPPLER COMPLETE: CPT

## 2025-07-22 PROCEDURE — 94761 N-INVAS EAR/PLS OXIMETRY MLT: CPT

## 2025-07-22 PROCEDURE — 6370000000 HC RX 637 (ALT 250 FOR IP): Performed by: NURSE PRACTITIONER

## 2025-07-22 PROCEDURE — 93306 TTE W/DOPPLER COMPLETE: CPT | Performed by: INTERNAL MEDICINE

## 2025-07-22 PROCEDURE — 1200000000 HC SEMI PRIVATE

## 2025-07-22 PROCEDURE — 6370000000 HC RX 637 (ALT 250 FOR IP): Performed by: STUDENT IN AN ORGANIZED HEALTH CARE EDUCATION/TRAINING PROGRAM

## 2025-07-22 PROCEDURE — 6360000002 HC RX W HCPCS: Performed by: STUDENT IN AN ORGANIZED HEALTH CARE EDUCATION/TRAINING PROGRAM

## 2025-07-22 RX ADMIN — SODIUM CHLORIDE, PRESERVATIVE FREE 10 ML: 5 INJECTION INTRAVENOUS at 11:18

## 2025-07-22 RX ADMIN — ASPIRIN 81 MG 81 MG: 81 TABLET ORAL at 11:18

## 2025-07-22 RX ADMIN — ATORVASTATIN CALCIUM 40 MG: 40 TABLET, FILM COATED ORAL at 22:34

## 2025-07-22 RX ADMIN — ENOXAPARIN SODIUM 40 MG: 100 INJECTION SUBCUTANEOUS at 11:18

## 2025-07-22 RX ADMIN — SODIUM CHLORIDE, PRESERVATIVE FREE 5 ML: 5 INJECTION INTRAVENOUS at 22:35

## 2025-07-22 NOTE — DISCHARGE INSTRUCTIONS
[place] [id number]  [phone]   MD Sandi Alvarez MD Sydney J Dunn, APRN,RACHEL Nath, CNP - diabetic nurse practitioner   Mishel Matamoros APRN, CNP  [facility]  [address] [id number]  [phone]   MD Mary Jo Donis MD Sandy D. Turner,   [facility]  [address], [address], [place] [address]  [phone]   MD Suresh Deleon MD Rachel Regan, MD Beraht R. Thapa, DOUGLAS Daniel APRN, AISHWARYA Ha PA-C  [facility]  [address] [place] [address]  [phone]   MD Gene Ordonez MD  [facility]  [address] [place] [address] [phone]   MD Yunior Sanabria MD  [facility]  [address] [place] [address] [phone]   MD Lyssa Adamson MD Mutahher Mohammed, MD Donya Weeks, APRN, RACHEL Llanos, [facility] (*Active Waiting List*)  [address] [place], [place]  [phone]  Dr. Louis Adkins MD  [address] [place] [address]  [phone]  [facility] - Dr. Shabbir Pina MD  [address], [place] [address]  [phone]  [facility]  [address] [place] [address]  [phone]  · Bola Jung DO  · Too Ordoñez MD  · Caitlin Baker MD  · Germania Hinojosa MD  · RACHEL Travis  · RACHEL Rick, CNP  RACHEL Somers CNP (direct PCP - does not accept insurance, monthly member fee)  [address] [place] [address]  [place] [address] [phone]  [facility]  [address] [place] [address]  [phone]  [facility]  [address], [place] [address]  [phone]   MD Haven Draper MD  [address], [address], [place], [place] [address] [phone]   DO Lazarus Varner, PASHAUNA Hernandez, APRN-CNP   Esau Norton APRN-CNP  [address], [place] [address]  [phone]  · Gabriella Ferguson MD  · Josie Judge APRN, CNP  · Nita Pruitt APRN, CNP  · Arsh Barajas MD  · Pankaj Bennett MD  · Amber Millard MD  · MD Agne Beckford sa yo se morgan echeverria entènèt massimoèk tyler ou. Charles a nahum rebolledo al mora mora.      Niall Reynoso DDS   [facility]   [address] [place] [address] [place], [place]   Telephone [phone]    Deshawn Loving DDS    [address] [place] [address] [place], [place]   Telephone [phone]    [facility]   [address] [id number], [place]   Telephone [phone]    [facility]   [address] [address], [place]   Telephone [phone]    [facility] Location   [address] [place] [address],   Telephone [phone]        Dalton [email or web address]  Hayde dolan nan [address] [place], [place] [address]. Hayde wade pwvicenta kevin, vcarey weberzè, pwvicenta tom, pwvicenta ulloa, ak yon varyete pen. Yon manm fanmi pa kaila ka fè dane yon fwa pa semèn.     Si se premye fwa w ap vizite gadmanje a, pote pyès idantite ak foto w ak prèv rezidans aktyèl ou. Prèv rezidans jessica ta dwe gen cuate non w ak adrès aktyèl ou, ki date nan 90 dènye estelita yo. Sa ka yon augustuswo tiffanie arias, yon jordyn rice, yon augustuswsydney hernandez, yoyanni petersen

## 2025-07-22 NOTE — CARE COORDINATION
Chart reviewed. Cm in to see pt at bedside with , Bubba, introduced self and role of case management. Pt is from home with roommates. Pt rents a room in a house, pt's room is on 2nd floor with 7 steps. Pt has a tub/shower, no SC. Pt does not drive, gets rides from people. Pt does not have PCP, list placed on AVS. Pt has insurance to assist with medical expenses.     PT/OT saw pt and recommended home with no additional needs. Cm informed pt of this, pt verbalized understanding of above.     Cm asked pt about non-compliance with BP meds. Pt stated that he was prescribed them by the hospital, but when he ran out, he had no wya to get more. Cm informed pt that he needs to be established with a PCP so he can continue getting his BP meds. Pt verbalized understanding. Cm informed pt that if he wants to use [facility] that he needs to call on Monday, as they only take new patients on Monday. Pt verbalized understanding of above. Cm placed information for [facility] and other PCPs on pt's AVS. No other questions or needs stated at this time. Cm to follow.    07/22/25 4815   Service Assessment   Patient Orientation Alert and Oriented   Cognition Alert   History Provided By Patient;Medical Record   Primary Caregiver Self   Accompanied By/Relationship N/A   Support Systems Friends/Neighbors   Patient's Healthcare Decision Maker is: Legal Next of Kin   PCP Verified by CM No  (Pt does not have PCP)   Prior Functional Level Independent in ADLs/IADLs   Current Functional Level Independent in ADLs/IADLs   Can patient return to prior living arrangement Yes   Ability to make needs known: Good   Family able to assist with home care needs: Yes   Would you like for me to discuss the discharge plan with any other family members/significant others, and if so, who? Yes  (Legal next of kin and family as needed)   Financial Resources None   Community Resources None   CM/SW Referral Other (see comment)  (Discharge planning assessment)

## 2025-07-22 NOTE — CONSULTS
Session ID: [id number]  Session Duration: 12 minutes  Language: Colombian Crehussain   ID: #[id number]   Name: Yashira      Patient seen   Chart reviewed   Full note to follow    Consulted for: echodensity on TTE with recent CVA    Reviewed possible causes with patient.   No Known infection including dental or rheumatic fever.   No use of IV drug use.     He is agreeable to have CLARISA in the morning.   He is agreeable to be NPO after MN

## 2025-07-22 NOTE — CONSULTS
Session ID: [id number]  Session Duration: 6 minutes  Language: Peruviantimmy Quintanilla   ID: #[id number]   Name: Michael

## 2025-07-22 NOTE — FLOWSHEET NOTE
4 Eyes Skin Assessment     NAME:  Honorio Colindres  YOB: 1955  MEDICAL RECORD NUMBER:  [id number]    The patient is being assessed for  Admission    I agree that at least one RN has performed a thorough Head to Toe Skin Assessment on the patient. ALL assessment sites listed below have been assessed.      Areas assessed by both nurses:    Head, Face, Ears, Shoulders, Back, Chest, Arms, Elbows, Hands, Sacrum. Buttock, Coccyx, Ischium, and Legs. Feet and Heels        Does the Patient have a Wound? No noted wound(s)       Buck Prevention initiated by RN: No  Wound Care Orders initiated by RN: No    For hospital-acquired stage 1 & 2 and ALL Stage 3,4, Unstageable, DTI, NWPT, and Complex wounds: place order “IP Wound Care/Ostomy Nurse Eval and Treat” by RN under : NA    New Ostomies, if present place, Ostomy referral order under : No     Nurse 1 eSignature: Electronically signed by Nanda Chilel RN on 7/22/25 at 12:17 PM EDT    **SHARE this note so that the co-signing nurse can place an eSignature**    Nurse 2 eSignature: {Esignature:[id number]}

## 2025-07-22 NOTE — PROGRESS NOTES
Neurology Service Progress Note  [facility]   Patient Name: Honorio Colindres  : 1955        Subjective:   Reason for consult: Stroke like symptoms  Chart was reviewed in detail, patient was seen and evaluated.  at bedside during exam. Reviewed MRI brain with the patient and discussed that there is evidence of acute multifocal stroke. Will continue aspirin and statin at this time. Recommend cardiology consult for CLARISA given concern for embolic event. Patient will need to establish with PCP at discharge. Discussed importance of medications at discharge.     Past Medical History:   Diagnosis Date    Hypertension     :   Past Surgical History:   Procedure Laterality Date    ABDOMEN SURGERY      COLONOSCOPY N/A 2022    COLONOSCOPY DIAGNOSTIC performed by Nahun Lovell MD at [facility] ENDOSCOPY     Medications:  Scheduled Meds:   atorvastatin  40 mg Oral Nightly    sodium chloride flush  5-40 mL IntraVENous 2 times per day    enoxaparin  40 mg SubCUTAneous Daily    aspirin  81 mg Oral Daily    Or    aspirin  300 mg Rectal Daily     Continuous Infusions:   sodium chloride       PRN Meds:.sodium chloride flush, sodium chloride, ondansetron **OR** ondansetron, polyethylene glycol    No Known Allergies  Social History     Socioeconomic History    Marital status: Legally      Spouse name: Not on file    Number of children: Not on file    Years of education: Not on file    Highest education level: Not on file   Occupational History    Not on file   Tobacco Use    Smoking status: Never    Smokeless tobacco: Never   Substance and Sexual Activity    Alcohol use: Not Currently    Drug use: Never    Sexual activity: Not on file   Other Topics Concern    Not on file   Social History Narrative    Not on file     Social Drivers of Health     Financial Resource Strain: Not on file   Food Insecurity: Food Insecurity Present (2025)    Hunger Vital Sign     Worried About Running Out of Food  and year, NAD, speech clear, language fluent, repetition and naming intact, follows commands appropriately    Cranial Nerve Exam:   CN II-XII: PERRL, VFF, no nystagmus, no gaze paresis, sensation V1-V3 intact b/l, muscles of facial expression symmetric; hearing intact to conversational tone, palate elevates symmetrically, shoulder elevation symmetric and tongue protrudes midline with movement side to side.    Motor Exam:       Strength 5/5 UE's/LE's b/l  Tone and bulk normal   No pronator drift    Deep Tendon Reflexes: 2/4 biceps, triceps, brachioradialis, patellar, and achilles b/l; flexor plantar responses b/l    Sensation: Intact light touch/pinprick/vibration UE's/LE's b/l    Coordination/Cerebellum:       Tremors--none      Rapidly alternating movements: no dysdiadochokinesia b/l                Heel-to-Shin: no dysmetria b/l      Finger-to-Nose: no dysmetria b/l    Gait and stance:      Gait: deferred      LABS:     Recent Labs     07/20/25  1424 07/20/25  1430 07/21/25  0318 07/22/25  0917   WBC 4.4  --  4.3  --      --  138 138   K 3.7  --  3.3* 3.6     --  105 104   CO2 24  --  22 25   BUN 23*  --  17 15   CREATININE 1.2  --  1.0 1.2   GLUCOSE 193* 112 90 94   ALBUMIN 4.2  --   --   --    INR 1.0  --   --   --           IMAGING:    CT head w/o contrast:  IMPRESSION:   1.  Small area of transcortical hypodensity in the right frontal lobe, and may   indicate subacute infarct. Further evaluation with MRI of the head is   recommended.  These findings were discussed with JOHNATHON ALVARADO at 7/20/2025 14:51 EST.  Please note if acute stroke suspected CT is relatively insensitive. MRI with   diffusion-weighted imaging can be obtained for further evaluation as clinically   warranted.     No evidence of hemorrhage.     Prominent periventricular white matter hypodensities, may indicate small vessel   ischemic changes.    CTA head and neck:  IMPRESSION:     1.  No acute CTA abnormality.  2.  The imaged

## 2025-07-23 ENCOUNTER — APPOINTMENT (OUTPATIENT)
Dept: NON INVASIVE DIAGNOSTICS | Age: 70
DRG: 065 | End: 2025-07-23
Attending: INTERNAL MEDICINE
Payer: COMMERCIAL

## 2025-07-23 PROBLEM — G45.9 TIA (TRANSIENT ISCHEMIC ATTACK): Status: ACTIVE | Noted: 2025-07-23

## 2025-07-23 LAB
ANION GAP SERPL CALCULATED.3IONS-SCNC: 9 MMOL/L (ref 9–17)
BASOPHILS # BLD: 0.04 K/UL
BASOPHILS NFR BLD: 1 % (ref 0–1)
BUN SERPL-MCNC: 14 MG/DL (ref 7–20)
CALCIUM SERPL-MCNC: 9 MG/DL (ref 8.3–10.6)
CHLORIDE SERPL-SCNC: 102 MMOL/L (ref 99–110)
CO2 SERPL-SCNC: 26 MMOL/L (ref 21–32)
CREAT SERPL-MCNC: 1.4 MG/DL (ref 0.8–1.3)
EOSINOPHIL # BLD: 0.25 K/UL
EOSINOPHILS RELATIVE PERCENT: 6 % (ref 0–3)
ERYTHROCYTE [DISTWIDTH] IN BLOOD BY AUTOMATED COUNT: 14.3 % (ref 11.7–14.9)
GFR, ESTIMATED: 50 ML/MIN/1.73M2
GLUCOSE SERPL-MCNC: 83 MG/DL (ref 74–99)
HCT VFR BLD AUTO: 44.1 % (ref 42–52)
HGB BLD-MCNC: 13.7 G/DL (ref 13.5–18)
IMM GRANULOCYTES # BLD AUTO: 0.01 K/UL
IMM GRANULOCYTES NFR BLD: 0 %
LYMPHOCYTES NFR BLD: 1.76 K/UL
LYMPHOCYTES RELATIVE PERCENT: 40 % (ref 24–44)
MCH RBC QN AUTO: 27.2 PG (ref 27–31)
MCHC RBC AUTO-ENTMCNC: 31.1 G/DL (ref 32–36)
MCV RBC AUTO: 87.5 FL (ref 78–100)
MONOCYTES NFR BLD: 0.54 K/UL
MONOCYTES NFR BLD: 12 % (ref 0–5)
NEUTROPHILS NFR BLD: 41 % (ref 36–66)
NEUTS SEG NFR BLD: 1.83 K/UL
PLATELET # BLD AUTO: 258 K/UL (ref 140–440)
PMV BLD AUTO: 9.1 FL (ref 7.5–11.1)
POTASSIUM SERPL-SCNC: 3.6 MMOL/L (ref 3.5–5.1)
RBC # BLD AUTO: 5.04 M/UL (ref 4.6–6.2)
SODIUM SERPL-SCNC: 137 MMOL/L (ref 136–145)
WBC OTHER # BLD: 4.4 K/UL (ref 4–10.5)

## 2025-07-23 PROCEDURE — 1200000000 HC SEMI PRIVATE

## 2025-07-23 PROCEDURE — 99223 1ST HOSP IP/OBS HIGH 75: CPT | Performed by: INTERNAL MEDICINE

## 2025-07-23 PROCEDURE — 6370000000 HC RX 637 (ALT 250 FOR IP): Performed by: INTERNAL MEDICINE

## 2025-07-23 PROCEDURE — 94761 N-INVAS EAR/PLS OXIMETRY MLT: CPT

## 2025-07-23 PROCEDURE — 6360000002 HC RX W HCPCS: Performed by: STUDENT IN AN ORGANIZED HEALTH CARE EDUCATION/TRAINING PROGRAM

## 2025-07-23 PROCEDURE — 2700000000 HC OXYGEN THERAPY PER DAY

## 2025-07-23 PROCEDURE — 80048 BASIC METABOLIC PNL TOTAL CA: CPT

## 2025-07-23 PROCEDURE — 6360000002 HC RX W HCPCS: Performed by: INTERNAL MEDICINE

## 2025-07-23 PROCEDURE — 6370000000 HC RX 637 (ALT 250 FOR IP): Performed by: NURSE PRACTITIONER

## 2025-07-23 PROCEDURE — 7100000010 HC PHASE II RECOVERY - FIRST 15 MIN: Performed by: INTERNAL MEDICINE

## 2025-07-23 PROCEDURE — 36415 COLL VENOUS BLD VENIPUNCTURE: CPT

## 2025-07-23 PROCEDURE — 99152 MOD SED SAME PHYS/QHP 5/>YRS: CPT | Performed by: INTERNAL MEDICINE

## 2025-07-23 PROCEDURE — 93312 ECHO TRANSESOPHAGEAL: CPT | Performed by: INTERNAL MEDICINE

## 2025-07-23 PROCEDURE — 2500000003 HC RX 250 WO HCPCS: Performed by: STUDENT IN AN ORGANIZED HEALTH CARE EDUCATION/TRAINING PROGRAM

## 2025-07-23 PROCEDURE — 93312 ECHO TRANSESOPHAGEAL: CPT

## 2025-07-23 PROCEDURE — 85025 COMPLETE CBC W/AUTO DIFF WBC: CPT

## 2025-07-23 PROCEDURE — 7100000011 HC PHASE II RECOVERY - ADDTL 15 MIN: Performed by: INTERNAL MEDICINE

## 2025-07-23 PROCEDURE — 93325 DOPPLER ECHO COLOR FLOW MAPG: CPT | Performed by: INTERNAL MEDICINE

## 2025-07-23 PROCEDURE — 87040 BLOOD CULTURE FOR BACTERIA: CPT

## 2025-07-23 RX ORDER — ASPIRIN 81 MG/1
81 TABLET, CHEWABLE ORAL ONCE
Status: COMPLETED | OUTPATIENT
Start: 2025-07-23 | End: 2025-07-23

## 2025-07-23 RX ORDER — MIDAZOLAM HYDROCHLORIDE 1 MG/ML
INJECTION, SOLUTION INTRAMUSCULAR; INTRAVENOUS PRN
Status: COMPLETED | OUTPATIENT
Start: 2025-07-23 | End: 2025-07-23

## 2025-07-23 RX ORDER — FENTANYL CITRATE 50 UG/ML
INJECTION, SOLUTION INTRAMUSCULAR; INTRAVENOUS PRN
Status: COMPLETED | OUTPATIENT
Start: 2025-07-23 | End: 2025-07-23

## 2025-07-23 RX ORDER — LIDOCAINE HYDROCHLORIDE 20 MG/ML
SOLUTION OROPHARYNGEAL PRN
Status: COMPLETED | OUTPATIENT
Start: 2025-07-23 | End: 2025-07-23

## 2025-07-23 RX ADMIN — FENTANYL CITRATE 25 MCG: 50 INJECTION, SOLUTION INTRAMUSCULAR; INTRAVENOUS at 14:33

## 2025-07-23 RX ADMIN — LIDOCAINE HYDROCHLORIDE 15 ML: 20 SOLUTION ORAL at 14:29

## 2025-07-23 RX ADMIN — SODIUM CHLORIDE, PRESERVATIVE FREE 5 ML: 5 INJECTION INTRAVENOUS at 21:41

## 2025-07-23 RX ADMIN — MIDAZOLAM 2 MG: 1 INJECTION INTRAMUSCULAR; INTRAVENOUS at 14:46

## 2025-07-23 RX ADMIN — ASPIRIN 81 MG 81 MG: 81 TABLET ORAL at 16:01

## 2025-07-23 RX ADMIN — MIDAZOLAM 2 MG: 1 INJECTION INTRAMUSCULAR; INTRAVENOUS at 14:32

## 2025-07-23 RX ADMIN — SODIUM CHLORIDE, PRESERVATIVE FREE 10 ML: 5 INJECTION INTRAVENOUS at 11:02

## 2025-07-23 RX ADMIN — ENOXAPARIN SODIUM 40 MG: 100 INJECTION SUBCUTANEOUS at 11:01

## 2025-07-23 RX ADMIN — ATORVASTATIN CALCIUM 40 MG: 40 TABLET, FILM COATED ORAL at 21:41

## 2025-07-23 NOTE — CONSULTS
Session ID: [id number]  Session Duration: 56 minutes  Language: Finnishtimmy Quintanilla   ID: #[id number]   Name: Jose Maria

## 2025-07-23 NOTE — FLOWSHEET NOTE
Spoke with patient via ; patient understands procedure he is having today, and does not have any questions thus far.

## 2025-07-23 NOTE — PROGRESS NOTES
V2.0  [facility] Hospitalist Progress Note      Name:  Honorio Colindres /Age/Sex: 1955  (69 y.o. male)   MRN & CSN:  [id number] & [id number] Encounter Date/Time: 2025 11:57 AM EDT    [facility] P[facility]: No primary care provider on file.       Hospital Day: 3    Assessment and Plan:   Honorio Colindres is a 69 y.o. male    Assessment and Plan:    Acute stroke  - Aspirin  - Atorvastatin  - Discussed with neurology -CLARISA recommended  - Cardiology saw patient  and plans a CLARISA    Benign essential hypertension  BPH  No PCP    Diet ADULT DIET; Regular  Diet NPO   DVT Prophylaxis [] Lovenox, []  Heparin, [] SCDs, [] Ambulation,  [] Eliquis, [] Xarelto  [] Coumadin   Code Status Full Code   Disposition From: home  Expected Disposition: home  Estimated Date of Discharge: TBD  Patient requires continued admission due to pending neuro clearance   Surrogate Decision Maker/ POA      Subjective:     Chief Complaint: Anxiety (Feeling uneasy ) and Shaking (Left hand )     Discussed with him via available  , and explained the plan, he denied any complaints    Review of Systems:    Review of Systems    Negative except above  Objective:     Intake/Output Summary (Last 24 hours) at 2025 2307  Last data filed at 2025 1118  Gross per 24 hour   Intake 10 ml   Output --   Net 10 ml        Vitals:   Vitals:    25 1523   BP: (!) 150/89   Pulse:    Resp:    Temp: 97.8 °F (36.6 °C)   SpO2: 99%       Physical Exam:     Physical Exam  Constitutional:       Appearance: He is not ill-appearing.   Pulmonary:      Effort: No respiratory distress.      Breath sounds: No stridor.   Skin:     Coloration: Skin is not jaundiced.             Medications:   Medications:    atorvastatin  40 mg Oral Nightly    sodium chloride flush  5-40 mL IntraVENous 2 times per day    enoxaparin  40 mg SubCUTAneous Daily    aspirin  81 mg Oral Daily    Or    aspirin  300 mg Rectal Daily      Infusions:    sodium chloride

## 2025-07-23 NOTE — PLAN OF CARE
Problem: Chronic Conditions and Co-morbidities  Goal: Patient's chronic conditions and co-morbidity symptoms are monitored and maintained or improved  7/23/2025 0948 by Nanda Chilel RN  Outcome: Progressing  7/23/2025 0227 by Ave Cavazos RN  Outcome: Progressing  Flowsheets (Taken 7/22/2025 2000)  Care Plan - Patient's Chronic Conditions and Co-Morbidity Symptoms are Monitored and Maintained or Improved:   Monitor and assess patient's chronic conditions and comorbid symptoms for stability, deterioration, or improvement   Collaborate with multidisciplinary team to address chronic and comorbid conditions and prevent exacerbation or deterioration   Update acute care plan with appropriate goals if chronic or comorbid symptoms are exacerbated and prevent overall improvement and discharge     Problem: Discharge Planning  Goal: Discharge to home or other facility with appropriate resources  7/23/2025 0948 by Nanda Chilel RN  Outcome: Progressing  7/23/2025 0227 by Ave Cavazos RN  Outcome: Progressing  Flowsheets (Taken 7/22/2025 2000)  Discharge to home or other facility with appropriate resources:   Identify barriers to discharge with patient and caregiver   Arrange for needed discharge resources and transportation as appropriate   Identify discharge learning needs (meds, wound care, etc)     Problem: Neurosensory - Adult  Goal: Achieves stable or improved neurological status  7/23/2025 0948 by Nanda Cihlel RN  Outcome: Progressing  7/23/2025 0227 by Ave Cavazos RN  Outcome: Progressing  Flowsheets (Taken 7/22/2025 2000)  Achieves stable or improved neurological status:   Assess for and report changes in neurological status   Initiate measures to prevent increased intracranial pressure   Maintain blood pressure and fluid volume within ordered parameters to optimize cerebral perfusion and minimize risk of hemorrhage   Monitor temperature, glucose, and sodium. Initiate appropriate

## 2025-07-23 NOTE — CONSULTS
Session ID: [id number]  Session Duration: 2 minutes  Language: Polish Creole   ID: #[id number]   Name: Suman Simons

## 2025-07-23 NOTE — CONSULTS
Session ID: [id number]  Session Duration: 16 minutes  Language: Amanda Quintanilla   ID: #[id number]   Name: Zulema

## 2025-07-23 NOTE — CONSULTS
Session ID: [id number]  Session Duration: Longer than 54 minutes  Language: Amanda Quintanilla   ID: #[id number]   Name: Mayela

## 2025-07-23 NOTE — PLAN OF CARE
Problem: Chronic Conditions and Co-morbidities  Goal: Patient's chronic conditions and co-morbidity symptoms are monitored and maintained or improved  Outcome: Progressing  Flowsheets (Taken 7/22/2025 2000)  Care Plan - Patient's Chronic Conditions and Co-Morbidity Symptoms are Monitored and Maintained or Improved:   Monitor and assess patient's chronic conditions and comorbid symptoms for stability, deterioration, or improvement   Collaborate with multidisciplinary team to address chronic and comorbid conditions and prevent exacerbation or deterioration   Update acute care plan with appropriate goals if chronic or comorbid symptoms are exacerbated and prevent overall improvement and discharge     Problem: Discharge Planning  Goal: Discharge to home or other facility with appropriate resources  Outcome: Progressing  Flowsheets (Taken 7/22/2025 2000)  Discharge to home or other facility with appropriate resources:   Identify barriers to discharge with patient and caregiver   Arrange for needed discharge resources and transportation as appropriate   Identify discharge learning needs (meds, wound care, etc)     Problem: Neurosensory - Adult  Goal: Achieves stable or improved neurological status  Outcome: Progressing  Flowsheets (Taken 7/22/2025 2000)  Achieves stable or improved neurological status:   Assess for and report changes in neurological status   Initiate measures to prevent increased intracranial pressure   Maintain blood pressure and fluid volume within ordered parameters to optimize cerebral perfusion and minimize risk of hemorrhage   Monitor temperature, glucose, and sodium. Initiate appropriate interventions as ordered  Goal: Achieves maximal functionality and self care  Outcome: Progressing  Flowsheets (Taken 7/22/2025 2000)  Achieves maximal functionality and self care:   Monitor swallowing and airway patency with patient fatigue and changes in neurological status   Encourage and assist patient to

## 2025-07-23 NOTE — CARE COORDINATION
Pt's discharge plan remains the same for home no needs.  Area resources placed on pt's AVS in his spoken language.  PCP list also placed on AVS.   CM available if needed.

## 2025-07-23 NOTE — CONSULTS
Session ID: [id number]  Session Duration: 7 minutes  Language: Amanda Quintanilla   ID: #[id number]   Name: Kristofer

## 2025-07-23 NOTE — CONSULTS
LVM for patient to return call to the clinic at her convenience.     If she returns the call, please offer an appointment with Dr. Awad today as she is the covering provider for Kamilah Sinha MD today.    Session ID: [id number]  Session Duration: 8 minutes  Language: Libyan Crehussain   ID: #[id number]   Name: Jose Maria

## 2025-07-24 PROBLEM — I51.89 MASS OF LEFT CARDIAC VENTRICLE: Status: ACTIVE | Noted: 2025-07-24

## 2025-07-24 LAB
ALBUMIN SERPL-MCNC: 3.2 G/DL (ref 3.4–5)
ALBUMIN/GLOB SERPL: 0.9 {RATIO} (ref 1.1–2.2)
ALP SERPL-CCNC: 70 U/L (ref 40–129)
ALT SERPL-CCNC: 13 U/L (ref 10–40)
ANION GAP SERPL CALCULATED.3IONS-SCNC: 12 MMOL/L (ref 9–17)
AST SERPL-CCNC: 34 U/L (ref 15–37)
BASOPHILS # BLD: 0.02 K/UL
BASOPHILS NFR BLD: 1 % (ref 0–1)
BILIRUB DIRECT SERPL-MCNC: <0.2 MG/DL (ref 0–0.3)
BILIRUB INDIRECT SERPL-MCNC: ABNORMAL MG/DL (ref 0–0.7)
BILIRUB SERPL-MCNC: 0.6 MG/DL (ref 0–1)
BUN SERPL-MCNC: 15 MG/DL (ref 7–20)
CALCIUM SERPL-MCNC: 8.8 MG/DL (ref 8.3–10.6)
CHLORIDE SERPL-SCNC: 104 MMOL/L (ref 99–110)
CO2 SERPL-SCNC: 18 MMOL/L (ref 21–32)
CREAT SERPL-MCNC: 1.2 MG/DL (ref 0.8–1.3)
CRP SERPL HS-MCNC: 13 MG/L (ref 0–5)
ECHO BSA: 1.85 M2
ECHO LV EF PHYSICIAN: 45 %
EOSINOPHIL # BLD: 0.1 K/UL
EOSINOPHILS RELATIVE PERCENT: 2 % (ref 0–3)
ERYTHROCYTE [DISTWIDTH] IN BLOOD BY AUTOMATED COUNT: 14 % (ref 11.7–14.9)
ERYTHROCYTE [SEDIMENTATION RATE] IN BLOOD BY WESTERGREN METHOD: 41 MM/HR (ref 0–20)
GFR, ESTIMATED: 58 ML/MIN/1.73M2
GLUCOSE SERPL-MCNC: 89 MG/DL (ref 74–99)
HCT VFR BLD AUTO: 46.3 % (ref 42–52)
HGB BLD-MCNC: 14.4 G/DL (ref 13.5–18)
HIV 1+2 AB+HIV1 P24 AG SERPL QL IA: NONREACTIVE
IMM GRANULOCYTES # BLD AUTO: 0.01 K/UL
IMM GRANULOCYTES NFR BLD: 0 %
LYMPHOCYTES NFR BLD: 1.65 K/UL
LYMPHOCYTES RELATIVE PERCENT: 39 % (ref 24–44)
MCH RBC QN AUTO: 27.2 PG (ref 27–31)
MCHC RBC AUTO-ENTMCNC: 31.1 G/DL (ref 32–36)
MCV RBC AUTO: 87.4 FL (ref 78–100)
MONOCYTES NFR BLD: 0.46 K/UL
MONOCYTES NFR BLD: 11 % (ref 0–5)
NEUTROPHILS NFR BLD: 47 % (ref 36–66)
NEUTS SEG NFR BLD: 1.97 K/UL
PLATELET # BLD AUTO: 249 K/UL (ref 140–440)
PMV BLD AUTO: 8.7 FL (ref 7.5–11.1)
POTASSIUM SERPL-SCNC: 4 MMOL/L (ref 3.5–5.1)
PROCALCITONIN SERPL-MCNC: 0.08 NG/ML
PROT SERPL-MCNC: 6.8 G/DL (ref 6.4–8.2)
RBC # BLD AUTO: 5.3 M/UL (ref 4.6–6.2)
SODIUM SERPL-SCNC: 134 MMOL/L (ref 136–145)
WBC OTHER # BLD: 4.2 K/UL (ref 4–10.5)

## 2025-07-24 PROCEDURE — 85025 COMPLETE CBC W/AUTO DIFF WBC: CPT

## 2025-07-24 PROCEDURE — 2500000003 HC RX 250 WO HCPCS: Performed by: STUDENT IN AN ORGANIZED HEALTH CARE EDUCATION/TRAINING PROGRAM

## 2025-07-24 PROCEDURE — 2580000003 HC RX 258: Performed by: NURSE PRACTITIONER

## 2025-07-24 PROCEDURE — 6370000000 HC RX 637 (ALT 250 FOR IP): Performed by: NURSE PRACTITIONER

## 2025-07-24 PROCEDURE — 87389 HIV-1 AG W/HIV-1&-2 AB AG IA: CPT

## 2025-07-24 PROCEDURE — 99223 1ST HOSP IP/OBS HIGH 75: CPT | Performed by: INTERNAL MEDICINE

## 2025-07-24 PROCEDURE — 6360000002 HC RX W HCPCS: Performed by: STUDENT IN AN ORGANIZED HEALTH CARE EDUCATION/TRAINING PROGRAM

## 2025-07-24 PROCEDURE — APPSS60 APP SPLIT SHARED TIME 46-60 MINUTES: Performed by: NURSE PRACTITIONER

## 2025-07-24 PROCEDURE — 6370000000 HC RX 637 (ALT 250 FOR IP): Performed by: STUDENT IN AN ORGANIZED HEALTH CARE EDUCATION/TRAINING PROGRAM

## 2025-07-24 PROCEDURE — B24BZZ4 ULTRASONOGRAPHY OF HEART WITH AORTA, TRANSESOPHAGEAL: ICD-10-PCS | Performed by: INTERNAL MEDICINE

## 2025-07-24 PROCEDURE — 6360000002 HC RX W HCPCS: Performed by: NURSE PRACTITIONER

## 2025-07-24 PROCEDURE — 85652 RBC SED RATE AUTOMATED: CPT

## 2025-07-24 PROCEDURE — 82248 BILIRUBIN DIRECT: CPT

## 2025-07-24 PROCEDURE — 84145 PROCALCITONIN (PCT): CPT

## 2025-07-24 PROCEDURE — 99232 SBSQ HOSP IP/OBS MODERATE 35: CPT | Performed by: INTERNAL MEDICINE

## 2025-07-24 PROCEDURE — 1200000000 HC SEMI PRIVATE

## 2025-07-24 PROCEDURE — 80053 COMPREHEN METABOLIC PANEL: CPT

## 2025-07-24 PROCEDURE — 86140 C-REACTIVE PROTEIN: CPT

## 2025-07-24 PROCEDURE — 94761 N-INVAS EAR/PLS OXIMETRY MLT: CPT

## 2025-07-24 PROCEDURE — 36415 COLL VENOUS BLD VENIPUNCTURE: CPT

## 2025-07-24 RX ORDER — METOPROLOL TARTRATE 25 MG/1
25 TABLET, FILM COATED ORAL 2 TIMES DAILY
Status: DISCONTINUED | OUTPATIENT
Start: 2025-07-24 | End: 2025-07-25 | Stop reason: HOSPADM

## 2025-07-24 RX ORDER — NIFEDIPINE 30 MG/1
30 TABLET, EXTENDED RELEASE ORAL DAILY
Status: DISCONTINUED | OUTPATIENT
Start: 2025-07-24 | End: 2025-07-25 | Stop reason: HOSPADM

## 2025-07-24 RX ADMIN — NIFEDIPINE 30 MG: 30 TABLET, FILM COATED, EXTENDED RELEASE ORAL at 16:39

## 2025-07-24 RX ADMIN — SODIUM CHLORIDE 3000 MG: 9 INJECTION, SOLUTION INTRAVENOUS at 20:52

## 2025-07-24 RX ADMIN — SODIUM CHLORIDE, PRESERVATIVE FREE 10 ML: 5 INJECTION INTRAVENOUS at 09:31

## 2025-07-24 RX ADMIN — METOPROLOL TARTRATE 25 MG: 25 TABLET, FILM COATED ORAL at 20:48

## 2025-07-24 RX ADMIN — ENOXAPARIN SODIUM 40 MG: 100 INJECTION SUBCUTANEOUS at 09:30

## 2025-07-24 RX ADMIN — ASPIRIN 81 MG 81 MG: 81 TABLET ORAL at 09:30

## 2025-07-24 RX ADMIN — SODIUM CHLORIDE, PRESERVATIVE FREE 10 ML: 5 INJECTION INTRAVENOUS at 20:48

## 2025-07-24 RX ADMIN — SODIUM CHLORIDE 3000 MG: 9 INJECTION, SOLUTION INTRAVENOUS at 14:54

## 2025-07-24 RX ADMIN — ATORVASTATIN CALCIUM 40 MG: 40 TABLET, FILM COATED ORAL at 20:48

## 2025-07-24 NOTE — PLAN OF CARE
Problem: Chronic Conditions and Co-morbidities  Goal: Patient's chronic conditions and co-morbidity symptoms are monitored and maintained or improved  7/24/2025 0938 by Julianna Pan)JIMMY  Outcome: Progressing  7/24/2025 0937 by Julianna Pan) RN  Outcome: Progressing  7/24/2025 0937 by Julianna Pan) RN  Outcome: Progressing  7/24/2025 0034 by Ave Cavazos RN  Outcome: Progressing  Flowsheets (Taken 7/23/2025 2000)  Care Plan - Patient's Chronic Conditions and Co-Morbidity Symptoms are Monitored and Maintained or Improved:   Monitor and assess patient's chronic conditions and comorbid symptoms for stability, deterioration, or improvement   Collaborate with multidisciplinary team to address chronic and comorbid conditions and prevent exacerbation or deterioration   Update acute care plan with appropriate goals if chronic or comorbid symptoms are exacerbated and prevent overall improvement and discharge     Problem: Discharge Planning  Goal: Discharge to home or other facility with appropriate resources  7/24/2025 0938 by Julianna Pan)JIMMY  Outcome: Progressing  7/24/2025 0937 by Julianna Pan (Hedy) RN  Outcome: Progressing  7/24/2025 0937 by Julianna Pan) RN  Outcome: Progressing  7/24/2025 0034 by Ave Cavazos RN  Outcome: Progressing  Flowsheets (Taken 7/23/2025 2000)  Discharge to home or other facility with appropriate resources:   Identify barriers to discharge with patient and caregiver   Arrange for needed discharge resources and transportation as appropriate   Identify discharge learning needs (meds, wound care, etc)     Problem: Neurosensory - Adult  Goal: Achieves stable or improved neurological status  7/24/2025 0938 by Julianna Pan) RN  Outcome: Progressing  7/24/2025 0937 by Julianna Pan) RN  Outcome: Progressing  7/24/2025 0937 by Julianna Pan),

## 2025-07-24 NOTE — PROGRESS NOTES
Dental cs                                  Lico Bowman  [phone] From: Lico Bowman [facility] ROUTINE RE: TIN Davis. we have a free coupon for the Eliquis for this month, however, next month is $189.67. We'll run the medications up to the patients room soon, thank you and have a great day.Thu, Jul 24, 2025, 12:13:21 PM

## 2025-07-24 NOTE — CONSULTS
Infectious Disease Consult Note  2025   Patient Name: Honorio Colindres : 1955     Assessment  LV vegetation  Pt admitted for management of Acute CVA. CLARISA showed mass like-vegetation structure of the left ventricle interventricular septum. CTS evaluated and does not recommend surgical intervention given likely organized thrombus. ID consulted for concern of IE  Imaging: MRI Brain Acute right MCA territory infarct and subacute right frontal lobe infart . CT head reviewed. CTA head/neck  non acute, mildly aneurysmal ascending thoracic aorta, multiple missing teeth with multiple teeth demonstrating periapical lucensies most suggestive of periapical abscess, near complete opacification of the right sphenoid sinus  Micro data: Blood cx x2 in process   WBC 4.4-no leukocytosis since adm, CRP 13, Procal .08. Afebrile since adm  Antimicrobial summary: None    Possible periapical abscess  Noted on CTA head/neck  Acute right MCA territory CVA with subacute right frontal lobe infarct  Neurology evaluated  HTN  Allergy: No antibiotic allergy  CrCl 51 ml/min  Comorbid conditions: HTN BPH s/p TURP 3/2025, CKD II/III, GERD    Plan  Therapeutic:  Start IV Unasyn 3gm q6 hrs for now   Diagnostic:   Trend CRP, CBC  F/u:   F/u Blood cx's  Other:   Recommend OP Dental Evaluation    Thank you for allowing me to consult in the care of this patient.  ------------------------  REASON FOR CONSULT: \"Infective syndrome\"\"Neurology suspects patient may have endocarditis, he doses have dental disease, also per Cardiology patient may have a healed vegetation\"  Requested by: Dr. Davis  HPI:Patient is a 69 y.o. male HTN, BPH status post TURP, CKD II/III, GERD who was admitted 2025 for further evaluation and management of left hand weakness. He presented afebrile with hypertensive BP. W/u revealed acute right MCA territory infarct and subacute right frontal lobe infarct on MRI Brain . CTA head/neck  non acute,  Hypertension       Past Surgical History:   Procedure Laterality Date    ABDOMEN SURGERY      COLONOSCOPY N/A 6/23/2022    COLONOSCOPY DIAGNOSTIC performed by Nahun Lovell MD at [facility] ENDOSCOPY      History reviewed. No pertinent family history.   Infectious disease related family history - not contibutory.   SOCIAL HISTORY  Social History     Tobacco Use    Smoking status: Never    Smokeless tobacco: Never   Substance Use Topics    Alcohol use: Not Currently      Ancestry: Slovak   Born: [place]  Lives rents a room in a house in [place]  Occupation: [employer]  No recent travel of significance.  No recent unusual exposures.  NO pets    ?  ALLERGIES  No Known Allergies   MEDICATIONS  Reviewed and are per the chart/EMR.   IMMUNIZATION HISTORY    There is no immunization history on file for this patient.  ?  ?  -------------------------------------------------------------------------------------------------------------------    Vital Signs:  Vitals:    07/24/25 0450   BP: (!) 139/91   Pulse: 54   Resp: 20   Temp: 98.3 °F (36.8 °C)   SpO2: 100%         Exam:    VS: noted; wt 73.2kg  Gen: alert and oriented X3, no distress  Skin: no stigmata of endocarditis  Wounds: C/D/I  HEMT: AT/NC Oropharynx pink, moist, and without lesions or exudates; very poor dentition, missing teeth  Eyes: EOMI, conjunctiva pink, sclera anicteric.   Neck: Supple. Trachea midline. No LAD.  Chest: CTA, resp unlabored.  Heart: RRR and no MRG.   Abd: soft, non-distended, Normoactive bowel sounds.  Ext: no clubbing, cyanosis, or edema  Catheter Site: without erythema or tenderness  LDA: CVC: PIV  Neuro: Mental status intact. CN 2-12 intact and no focal sensory or motor deficits    ?Diagnostic Studies: reviewed  ??  I have examined this patient and available medical records on this date and have made the above observations, conclusions and recommendations.  Electronically signed by: Electronically signed by RACHEL Boss CNP on

## 2025-07-24 NOTE — CARE COORDINATION
Plan at this time is for home with friends.  Pt remains independent in the room.  PT/OT recs are for home no need for PT/OT.  Area resources placed on AVS in his spoken language.  Pt has been informed to call [facility] on Mondays to try to get an appt. [facility] only takes appt for new pts on Mondays.  Pt stated understanding.  No other needs identified att his time. CM available if needed.

## 2025-07-24 NOTE — PROGRESS NOTES
Brief Neurology Note:  Chart was reviewed, patient had CLARISA with possible healed vegetation on LVOT. CT surgery and ID have been consulted. Per CT surgery notes no plan for surgical intervention at this time however they do recommend anticoagulation. From neurology standpoint patient is stable to start anticoagulation 7/25/25 given recent stroke to minimize risk for hemorrhagic conversion. Patient will need to follow up with stroke clinic at discharge. No further recommendations. We will sign off at this time. Please contact us at this time. Discussed plan with Dr. Boogie , Dr. Davis.     Jocelin Lindquist, APRN - CNP  Neurology

## 2025-07-24 NOTE — CONSULTS
Session ID: [id number]  Session Duration: 4 minutes  Language: Maltesetimmy Quintanilla   ID: #[id number]   Name: Bev

## 2025-07-24 NOTE — CONSULTS
Session ID: [id number]  Session Duration: 5 minutes  Language: Malian Creole   ID: #[id number]   Name: Goldie

## 2025-07-24 NOTE — CONSULTS
Cardiothoracic Surgery  IN-PATIENT SERVICE   [facility]    CONSULTATION / HISTORY AND PHYSICAL EXAMINATION            Date:   7/24/2025  Patient name:  Honorio Colindres  Date of admission:  7/20/2025  1:55 PM  MRN:   [id number]  Account:  [id number]  YOB: 1955  PCP:    No primary care provider on file.  Room:   [address] [place] [address]  Code Status:    Full Code    Physician Requesting Consult: Jhonny Davis MD    Reason for Consult:  LVOT Mass    Chief Complaint:     Left sided weakness    History of Present Illness:     Honorio Colindres is a 69 year old male with a PMH notable for HTN who presented to the ED with a complaint of left sided weakness for approximately 3 days. Initially a stroke alert was called and he was evaluated by [facility] neuro via tele. No intervention was recommended initially due to being outside the therapeutic window. Neurology was consulted for further evaluation. The patient underwent a CTA of the head and neck which was unremarkable. Subsequently the patient underwent an MRI that showed evidence of acute infarcts in the right MCA territory. The patient was worked up for possible source of the CVA and an echocardiogram was done which showed a mobile echodensity noted above the LVOT on the interventricular septum. The patient then underwent a CLARISA to further characterize the finding on echocardiogram and was found to have a healed calcified mass like structure noted on interventricular septum. Cardiac surgery was consulted for further recommendations and any surgical interventions indicated for this finding.     Past Medical History:     Past Medical History:   Diagnosis Date    Hypertension         Past Surgical History:     Past Surgical History:   Procedure Laterality Date    ABDOMEN SURGERY      COLONOSCOPY N/A 6/23/2022    COLONOSCOPY DIAGNOSTIC performed by Nahun Lovell MD at [facility] ENDOSCOPY        Medications Prior to Admission:     Prior  identified.     The ventricles are normal in size. There is no shift the midline structures.   Midline sagittal structures are within normal limits.     The normal signal flow voids of the major intracranial arteries are noted at the   skull base. The major dural venous sinuses are patent.     Mild mucosal thickening is noted within the maxillary sinuses, right greater   than left. There is complete opacification of the right sphenoid sinus. There is   a trace amount of fluid within the mastoid air cells. Orbital contents are   within normal limits.     Bone marrow signal is within normal limits.     IMPRESSION:  1.  Acute infarcts in the right MCA territory.  2.  Small area of curvilinear cortical enhancement in the right frontal lobe   most suggestive of a subacute infarct. However recommend follow-up brain MRI in   3-6 months for reevaluation.  3.  Moderate white matter changes likely reflective of chronic microangiopathy.    Hx of:  Afib: No  PCI: No  Chest radiation: No      Assessment :      LVOT Mass    Plan:       Recommendation:  I believe Mr. Colindres has either chronic organized thrombus vs healed endocarditis in his LVOT. With negative blood cultures to date, active endocarditis is less likely. CLARISA indicates a healed calcified mass which favors chronic organized thrombus. At this time, there is no surgical indication from a cardiac surgery standpoint. The patient should be placed on anticoagulation for this finding, either coumadin or NOAC, at cardiology's recommendation. If the patient fails anticoagulation therapy, surgical intervention can be revisited.     Plan of care discussed with Dr. Miranda Santiago PA-C 07/24/25 9:05 AM      New Consults 8:00AM-4:30PM: Call Office, 4:30PM to 8:00AM Surgeon on-call    CVICU or other units patient follow up: Comofrt author of this note 8:00AM-4:30PM    CVICU patient or urgent follow up: 4:30PM to 8:00AM Call or Page Surgeon on-call     Step-down

## 2025-07-24 NOTE — PROGRESS NOTES
Outpatient Pharmacy Progress Note for Meds-to-Beds    Total number of Prescriptions Filled: 6    Additional Documentation:  Patient picked-up the medication(s) in the OP Pharmacy      Thank you for letting us serve your patients.  [address] [address]    Phone: [phone]    Fax: [phone]

## 2025-07-24 NOTE — PROGRESS NOTES
Cardiology Progress Note          Admit Date:  7/20/2025    Consult reason/ Seen today for: CLARISA    Subjective and  Overnight Events:  Patient speaks Creole and requires .     Assessment / Plan / Recommendation:     LVOT mass: healed vegetation, CTS consulted. Patient does not require surgery at this time. Recommend to start NOAC, patient already placed on Eliquis 5 mg BID per hospitalist.   HTN: Elevated, restart home medications.  Lopressor 25 mg twice daily and nifedipine 30 mg daily.     Electronically signed by RACHEL Pagan CNP on 7/24/2025 at 3:49 PM

## 2025-07-24 NOTE — PLAN OF CARE
Problem: Chronic Conditions and Co-morbidities  Goal: Patient's chronic conditions and co-morbidity symptoms are monitored and maintained or improved  Outcome: Progressing  Flowsheets (Taken 7/23/2025 2000)  Care Plan - Patient's Chronic Conditions and Co-Morbidity Symptoms are Monitored and Maintained or Improved:   Monitor and assess patient's chronic conditions and comorbid symptoms for stability, deterioration, or improvement   Collaborate with multidisciplinary team to address chronic and comorbid conditions and prevent exacerbation or deterioration   Update acute care plan with appropriate goals if chronic or comorbid symptoms are exacerbated and prevent overall improvement and discharge     Problem: Discharge Planning  Goal: Discharge to home or other facility with appropriate resources  Outcome: Progressing  Flowsheets (Taken 7/23/2025 2000)  Discharge to home or other facility with appropriate resources:   Identify barriers to discharge with patient and caregiver   Arrange for needed discharge resources and transportation as appropriate   Identify discharge learning needs (meds, wound care, etc)     Problem: Neurosensory - Adult  Goal: Achieves stable or improved neurological status  Outcome: Progressing  Flowsheets (Taken 7/23/2025 2000)  Achieves stable or improved neurological status:   Assess for and report changes in neurological status   Initiate measures to prevent increased intracranial pressure   Maintain blood pressure and fluid volume within ordered parameters to optimize cerebral perfusion and minimize risk of hemorrhage   Monitor temperature, glucose, and sodium. Initiate appropriate interventions as ordered  Goal: Achieves maximal functionality and self care  Outcome: Progressing  Flowsheets (Taken 7/23/2025 2000)  Achieves maximal functionality and self care:   Monitor swallowing and airway patency with patient fatigue and changes in neurological status   Encourage and assist patient to

## 2025-07-24 NOTE — CONSULTS
CARDIOLOGY CONSULT NOTE   Reason for consultation: Mobile density noted on LVOT    Referring physician:  Amadeo Reese MD     Primary care physician: No primary care provider on file.      Dear   Thanks for the consult.    History of present illness:Honorio is a 69 y.o.year old who  presents with anxiety and shaking [place] speaking, cardiac and surgical for mobile density noted on LVOT on echo, today CLARISA is performed looks like he has vegetation on the LVOT, patient is Creole speaking  used for history and physical  Chief Complaint   Patient presents with    Anxiety     Feeling uneasy     Shaking     Left hand      Blood pressure, cholesterol, blood glucose and weight are well controlled.    Past medical history:    has a past medical history of Hypertension.  Past surgical history:   has a past surgical history that includes Abdomen surgery and Colonoscopy (N/A, 6/23/2022).  Social History:   reports that he has never smoked. He has never used smokeless tobacco. He reports that he does not currently use alcohol. He reports that he does not use drugs.  Family history:   no family history of CAD, STROKE of DM    No Known Allergies    atorvastatin (LIPITOR) tablet 40 mg, Nightly  sodium chloride flush 0.9 % injection 5-40 mL, 2 times per day  sodium chloride flush 0.9 % injection 5-40 mL, PRN  0.9 % sodium chloride infusion, PRN  ondansetron (ZOFRAN-ODT) disintegrating tablet 4 mg, Q8H PRN   Or  ondansetron (ZOFRAN) injection 4 mg, Q6H PRN  polyethylene glycol (GLYCOLAX) packet 17 g, Daily PRN  enoxaparin (LOVENOX) injection 40 mg, Daily  aspirin chewable tablet 81 mg, Daily   Or  aspirin suppository 300 mg, Daily      Current Facility-Administered Medications   Medication Dose Route Frequency Provider Last Rate Last Admin    atorvastatin (LIPITOR) tablet 40 mg  40 mg Oral Nightly Jocelin Lindquist, APRN - CNP   40 mg at 07/22/25 2234    sodium chloride flush 0.9 % injection 5-40 mL  5-40 mL IntraVENous 2 times

## 2025-07-24 NOTE — PROGRESS NOTES
V2.0  [facility] Hospitalist Progress Note      Name:  Honorio Colindres /Age/Sex: 1955  (69 y.o. male)   MRN & CSN:  [id number] & [id number] Encounter Date/Time: 2025 11:57 AM EDT    Location:  [address] [place] [address]-A PCP: No primary care provider on file.       Hospital Day: 4    Assessment and Plan:   Honorio Colindres is a 69 y.o. male    Assessment and Plan:    Acute stroke  - Aspirin  - Atorvastatin  - Embolic workup    Mobile echodensity noted above the LVOT on the interventricular septum per TTE done on   - CLARISA done   - Discussed with cardiology on -CT surgery consult recommended and was consulted    Concern for infective endocarditis given abnormal echo finding, dental disease, and stroke  - Reviewed the  [employer] Society for Cardiovascular Infectious Disease (ISCVID) criteria for the diagnosis of IE  - Check blood cultures and inflammatory markers  - ID consult  - Look for any developing stigmata of endocarditis  - Clinically he appears well and is requesting to go back to work at [employer] soon    Dental disease  - Discussed with patient via online  that he will need to follow-up with a dentist.  This was done on     Benign essential hypertension  BPH  No PCP    Diet ADULT DIET; Regular   DVT Prophylaxis [] Lovenox, []  Heparin, [] SCDs, [] Ambulation,  [] Eliquis, [] Xarelto  [] Coumadin   Code Status Full Code   Disposition From: home  Expected Disposition: home  Estimated Date of Discharge: TBD  Patient requires continued admission due to pending neuro clearance   Surrogate Decision Maker/ POA      Subjective:     Chief Complaint: Anxiety (Feeling uneasy ) and Shaking (Left hand )     Discussed via online .  Explained the plan.  He voiced no complaints today.    Review of Systems:    Review of Systems    Negative except above  Objective:     Intake/Output Summary (Last 24 hours) at 2025 2202  Last data filed at 2025 1102  Gross per 24 hour

## 2025-07-24 NOTE — PROGRESS NOTES
Procedures moderate sedation    Start time   End time     Sedation used: 2 mg versed and 12.5mg fentanyl, continous monitoring of airway, oxygen saturation, blood pressure and vitals done through out the case       Sedation Plan  ASA: class 3 - patient with severe systemic disease     Mallampati class: III - soft palate, base of uvula visible.    Sedation plan: moderate (conscious sedation)

## 2025-07-24 NOTE — PLAN OF CARE
Problem: Chronic Conditions and Co-morbidities  Goal: Patient's chronic conditions and co-morbidity symptoms are monitored and maintained or improved  7/24/2025 0937 by Julianna Pan RN ([place])  Outcome: Progressing  7/24/2025 0034 by Ave Cavazos RN  Outcome: Progressing  Flowsheets (Taken 7/23/2025 2000)  Care Plan - Patient's Chronic Conditions and Co-Morbidity Symptoms are Monitored and Maintained or Improved:   Monitor and assess patient's chronic conditions and comorbid symptoms for stability, deterioration, or improvement   Collaborate with multidisciplinary team to address chronic and comorbid conditions and prevent exacerbation or deterioration   Update acute care plan with appropriate goals if chronic or comorbid symptoms are exacerbated and prevent overall improvement and discharge     Problem: Discharge Planning  Goal: Discharge to home or other facility with appropriate resources  7/24/2025 0937 by Julianna Pan RN ([place])  Outcome: Progressing  7/24/2025 0034 by Ave Cavazos RN  Outcome: Progressing  Flowsheets (Taken 7/23/2025 2000)  Discharge to home or other facility with appropriate resources:   Identify barriers to discharge with patient and caregiver   Arrange for needed discharge resources and transportation as appropriate   Identify discharge learning needs (meds, wound care, etc)     Problem: Neurosensory - Adult  Goal: Achieves stable or improved neurological status  7/24/2025 0937 by Julianna Pan RN ([place])  Outcome: Progressing  7/24/2025 0034 by Ave Cavazos RN  Outcome: Progressing  Flowsheets (Taken 7/23/2025 2000)  Achieves stable or improved neurological status:   Assess for and report changes in neurological status   Initiate measures to prevent increased intracranial pressure   Maintain blood pressure and fluid volume within ordered parameters to optimize cerebral perfusion and minimize risk of hemorrhage   Monitor temperature, glucose,

## 2025-07-24 NOTE — PROGRESS NOTES
Today's plan: As per CT surgery started on anticoagulation Eliquis 5 mg twice daily      Admit Date:  7/20/2025    Subjective: Okay      Chief complaints on admission  Chief Complaint   Patient presents with    Anxiety     Feeling uneasy     Shaking     Left hand          History of present illness:Honorio is a 69 y.o.year old who  presents with had concerns including Anxiety (Feeling uneasy ) and Shaking (Left hand ).      Past medical history:    has a past medical history of Hypertension.  Past surgical history:   has a past surgical history that includes Abdomen surgery and Colonoscopy (N/A, 6/23/2022).  Social History:   reports that he has never smoked. He has never used smokeless tobacco. He reports that he does not currently use alcohol. He reports that he does not use drugs.  Family history:  family history is not on file.    No Known Allergies      Objective:   BP (!) 162/95   Pulse 71   Temp 98.3 °F (36.8 °C) (Oral)   Resp 20   Ht 1.67 m (5' 5.75\")   Wt 73.2 kg (161 lb 6.4 oz)   SpO2 100%   BMI 26.25 kg/m²     Intake/Output Summary (Last 24 hours) at 7/24/2025 1945  Last data filed at 7/23/2025 2000  Gross per 24 hour   Intake 120 ml   Output --   Net 120 ml       TELEMETRY:     Physical Exam:  Constitutional:  Well developed, Well nourished, No acute distress, Non-toxic appearance.   HENT:  Normocephalic, Atraumatic, Bilateral external ears normal, Oropharynx moist, No oral exudates, Nose normal. Neck- Normal range of motion, No tenderness, Supple, No stridor.   Eyes:  PERRL, EOMI, Conjunctiva normal, No discharge.   Respiratory:  Normal breath sounds, No respiratory distress, No wheezing, No chest tenderness.,no use of accessory muscles, diaphragm movement is normal  Cardiovascular: (PMI) apex non displaced,no lifts no thrills, no s3,no s4, Normal heart rate, Normal rhythm, No murmurs, No rubs, No gallops. Carotid arteries pulse and amplitude are normal no bruit, no abdominal bruit noted (  normal abdominal aorta ausculation), femoral arteries pulse and amplitude are normal no bruit, pedal pulses are normal  GI:  Bowel sounds normal, Soft, No tenderness, No masses, No pulsatile masses.   : External genitalia appear normal, No masses or lesions. No discharge. No CVA tenderness.   Musculoskeletal:  Intact distal pulses, No edema, No tenderness, No cyanosis, No clubbing. Good range of motion in all major joints. No tenderness to palpation or major deformities noted. Back- No tenderness.   Integument:  Warm, Dry, No erythema, No rash.   Lymphatic:  No lymphadenopathy noted.   Neurologic:  Alert & oriented x 3, Normal motor function, Normal sensory function, No focal deficits noted.   Psychiatric:  Affect normal, Judgment normal, Mood normal.     Medications:    [START ON 7/25/2025] apixaban  5 mg Oral BID    ampicillin-sulbactam  3,000 mg IntraVENous Q6H    metoprolol tartrate  25 mg Oral BID    NIFEdipine  30 mg Oral Daily    atorvastatin  40 mg Oral Nightly    sodium chloride flush  5-40 mL IntraVENous 2 times per day    aspirin  81 mg Oral Daily    Or    aspirin  300 mg Rectal Daily      sodium chloride       sodium chloride flush, sodium chloride, ondansetron **OR** ondansetron, polyethylene glycol    Lab Data:  CBC:   Recent Labs     07/23/25  0312 07/24/25  0802   WBC 4.4 4.2   HGB 13.7 14.4   HCT 44.1 46.3   MCV 87.5 87.4    249     BMP:   Recent Labs     07/22/25  0917 07/23/25  0312 07/24/25  0253    137 134*   K 3.6 3.6 4.0    102 104   CO2 25 26 18*   BUN 15 14 15   CREATININE 1.2 1.4* 1.2     LIVER PROFILE:   Recent Labs     07/24/25  0253   AST 34   ALT 13   BILIDIR <0.2   BILITOT 0.6   ALKPHOS 70     PT/INR: No results for input(s): \"PROTIME\", \"INR\" in the last 72 hours.  APTT: No results for input(s): \"APTT\" in the last 72 hours.  BNP:  No results for input(s): \"BNP\" in the last 72 hours.  TROPONIN: @TROPONINI:3@      Assessment:  69 y.o.year old who is admitted

## 2025-07-24 NOTE — PLAN OF CARE
Problem: Chronic Conditions and Co-morbidities  Goal: Patient's chronic conditions and co-morbidity symptoms are monitored and maintained or improved  7/24/2025 0937 by Julianna Pan RN ([place])  Outcome: Progressing  7/24/2025 0937 by Julianna Pan RN ([place])  Outcome: Progressing  7/24/2025 0034 by Ave Cavazos RN  Outcome: Progressing  Flowsheets (Taken 7/23/2025 2000)  Care Plan - Patient's Chronic Conditions and Co-Morbidity Symptoms are Monitored and Maintained or Improved:   Monitor and assess patient's chronic conditions and comorbid symptoms for stability, deterioration, or improvement   Collaborate with multidisciplinary team to address chronic and comorbid conditions and prevent exacerbation or deterioration   Update acute care plan with appropriate goals if chronic or comorbid symptoms are exacerbated and prevent overall improvement and discharge     Problem: Discharge Planning  Goal: Discharge to home or other facility with appropriate resources  7/24/2025 0937 by Julianna Pan RN ([place])  Outcome: Progressing  7/24/2025 0937 by Julianna Pan RN ([place])  Outcome: Progressing  7/24/2025 0034 by Ave Cavazos RN  Outcome: Progressing  Flowsheets (Taken 7/23/2025 2000)  Discharge to home or other facility with appropriate resources:   Identify barriers to discharge with patient and caregiver   Arrange for needed discharge resources and transportation as appropriate   Identify discharge learning needs (meds, wound care, etc)     Problem: Neurosensory - Adult  Goal: Achieves stable or improved neurological status  7/24/2025 0937 by Julianna Pan RN ([place])  Outcome: Progressing  7/24/2025 0937 by Julianna Pan RN ([place])  Outcome: Progressing  7/24/2025 0034 by Ave Cavazos RN  Outcome: Progressing  Flowsheets (Taken 7/23/2025 2000)  Achieves stable or improved neurological status:   Assess for and report changes in neurological status    as ordered     Problem: Musculoskeletal - Adult  Goal: Return mobility to safest level of function  7/24/2025 0937 by Julianna Pan)JIMMY  Outcome: Progressing  7/24/2025 0937 by Julianna Pan)JIMMY  Outcome: Progressing  7/24/2025 0034 by Ave Cavazos RN  Outcome: Progressing  Flowsheets (Taken 7/23/2025 2000)  Return Mobility to Safest Level of Function:   Assess patient stability and activity tolerance for standing, transferring and ambulating with or without assistive devices   Assist with transfers and ambulation using safe patient handling equipment as needed  Goal: Return ADL status to a safe level of function  7/24/2025 0937 by Julianna Pan)JIMMY  Outcome: Progressing  7/24/2025 0937 by Julianna Pan RN ([place])  Outcome: Progressing  7/24/2025 0034 by Ave Cavazos RN  Outcome: Progressing  Flowsheets (Taken 7/23/2025 2000)  Return ADL Status to a Safe Level of Function:   Administer medication as ordered   Assess activities of daily living deficits and provide assistive devices as needed     Problem: Safety - Adult  Goal: Free from fall injury  7/24/2025 0937 by Julianna Pan)JIMMY  Outcome: Progressing  7/24/2025 0937 by Julianna Pan RN ([place])  Outcome: Progressing  7/24/2025 0034 by Ave Cavazos RN  Outcome: Progressing

## 2025-07-25 VITALS
OXYGEN SATURATION: 100 % | HEART RATE: 57 BPM | BODY MASS INDEX: 25.94 KG/M2 | RESPIRATION RATE: 16 BRPM | TEMPERATURE: 98.6 F | DIASTOLIC BLOOD PRESSURE: 82 MMHG | SYSTOLIC BLOOD PRESSURE: 141 MMHG | WEIGHT: 161.4 LBS | HEIGHT: 66 IN

## 2025-07-25 LAB
ANION GAP SERPL CALCULATED.3IONS-SCNC: 10 MMOL/L (ref 9–17)
BASOPHILS # BLD: 0.03 K/UL
BASOPHILS NFR BLD: 1 % (ref 0–1)
BUN SERPL-MCNC: 14 MG/DL (ref 7–20)
CALCIUM SERPL-MCNC: 9.2 MG/DL (ref 8.3–10.6)
CHLORIDE SERPL-SCNC: 105 MMOL/L (ref 99–110)
CO2 SERPL-SCNC: 24 MMOL/L (ref 21–32)
CREAT SERPL-MCNC: 1.2 MG/DL (ref 0.8–1.3)
CRP SERPL HS-MCNC: 12.4 MG/L (ref 0–5)
EKG ATRIAL RATE: 63 BPM
EKG DIAGNOSIS: NORMAL
EKG P AXIS: 42 DEGREES
EKG P-R INTERVAL: 184 MS
EKG Q-T INTERVAL: 410 MS
EKG QRS DURATION: 124 MS
EKG QTC CALCULATION (BAZETT): 419 MS
EKG R AXIS: -21 DEGREES
EKG T AXIS: 123 DEGREES
EKG VENTRICULAR RATE: 63 BPM
EOSINOPHIL # BLD: 0.26 K/UL
EOSINOPHILS RELATIVE PERCENT: 6 % (ref 0–3)
ERYTHROCYTE [DISTWIDTH] IN BLOOD BY AUTOMATED COUNT: 14.1 % (ref 11.7–14.9)
GFR, ESTIMATED: 60 ML/MIN/1.73M2
GLUCOSE SERPL-MCNC: 109 MG/DL (ref 74–99)
HCT VFR BLD AUTO: 44.9 % (ref 42–52)
HGB BLD-MCNC: 13.9 G/DL (ref 13.5–18)
IMM GRANULOCYTES # BLD AUTO: 0.01 K/UL
IMM GRANULOCYTES NFR BLD: 0 %
LYMPHOCYTES NFR BLD: 1.7 K/UL
LYMPHOCYTES RELATIVE PERCENT: 38 % (ref 24–44)
MCH RBC QN AUTO: 26.9 PG (ref 27–31)
MCHC RBC AUTO-ENTMCNC: 31 G/DL (ref 32–36)
MCV RBC AUTO: 87 FL (ref 78–100)
MONOCYTES NFR BLD: 0.49 K/UL
MONOCYTES NFR BLD: 11 % (ref 0–5)
NEUTROPHILS NFR BLD: 44 % (ref 36–66)
NEUTS SEG NFR BLD: 1.94 K/UL
PLATELET # BLD AUTO: 257 K/UL (ref 140–440)
PMV BLD AUTO: 9.3 FL (ref 7.5–11.1)
POTASSIUM SERPL-SCNC: 3.5 MMOL/L (ref 3.5–5.1)
RBC # BLD AUTO: 5.16 M/UL (ref 4.6–6.2)
SODIUM SERPL-SCNC: 139 MMOL/L (ref 136–145)
WBC OTHER # BLD: 4.4 K/UL (ref 4–10.5)

## 2025-07-25 PROCEDURE — 6370000000 HC RX 637 (ALT 250 FOR IP): Performed by: NURSE PRACTITIONER

## 2025-07-25 PROCEDURE — 86140 C-REACTIVE PROTEIN: CPT

## 2025-07-25 PROCEDURE — 94761 N-INVAS EAR/PLS OXIMETRY MLT: CPT

## 2025-07-25 PROCEDURE — 36415 COLL VENOUS BLD VENIPUNCTURE: CPT

## 2025-07-25 PROCEDURE — 6360000002 HC RX W HCPCS: Performed by: NURSE PRACTITIONER

## 2025-07-25 PROCEDURE — 99232 SBSQ HOSP IP/OBS MODERATE 35: CPT | Performed by: NURSE PRACTITIONER

## 2025-07-25 PROCEDURE — 99232 SBSQ HOSP IP/OBS MODERATE 35: CPT | Performed by: INTERNAL MEDICINE

## 2025-07-25 PROCEDURE — 85025 COMPLETE CBC W/AUTO DIFF WBC: CPT

## 2025-07-25 PROCEDURE — 80048 BASIC METABOLIC PNL TOTAL CA: CPT

## 2025-07-25 PROCEDURE — 6370000000 HC RX 637 (ALT 250 FOR IP): Performed by: STUDENT IN AN ORGANIZED HEALTH CARE EDUCATION/TRAINING PROGRAM

## 2025-07-25 PROCEDURE — 2500000003 HC RX 250 WO HCPCS: Performed by: STUDENT IN AN ORGANIZED HEALTH CARE EDUCATION/TRAINING PROGRAM

## 2025-07-25 PROCEDURE — 2580000003 HC RX 258: Performed by: NURSE PRACTITIONER

## 2025-07-25 PROCEDURE — 6370000000 HC RX 637 (ALT 250 FOR IP)

## 2025-07-25 RX ORDER — NIFEDIPINE 30 MG/1
30 TABLET, EXTENDED RELEASE ORAL DAILY
Qty: 30 TABLET | Refills: 1 | Status: SHIPPED | OUTPATIENT
Start: 2025-07-25

## 2025-07-25 RX ORDER — METOPROLOL TARTRATE 25 MG/1
25 TABLET, FILM COATED ORAL 2 TIMES DAILY
Qty: 60 TABLET | Refills: 1 | Status: SHIPPED | OUTPATIENT
Start: 2025-07-25

## 2025-07-25 RX ORDER — FINASTERIDE 5 MG/1
5 TABLET, FILM COATED ORAL DAILY
Qty: 30 TABLET | Refills: 0 | Status: SHIPPED | OUTPATIENT
Start: 2025-07-25

## 2025-07-25 RX ADMIN — NIFEDIPINE 30 MG: 30 TABLET, FILM COATED, EXTENDED RELEASE ORAL at 09:50

## 2025-07-25 RX ADMIN — SODIUM CHLORIDE 3000 MG: 9 INJECTION, SOLUTION INTRAVENOUS at 03:03

## 2025-07-25 RX ADMIN — ASPIRIN 81 MG 81 MG: 81 TABLET ORAL at 09:50

## 2025-07-25 RX ADMIN — METOPROLOL TARTRATE 25 MG: 25 TABLET, FILM COATED ORAL at 09:52

## 2025-07-25 RX ADMIN — APIXABAN 5 MG: 5 TABLET, FILM COATED ORAL at 09:50

## 2025-07-25 RX ADMIN — SODIUM CHLORIDE, PRESERVATIVE FREE 10 ML: 5 INJECTION INTRAVENOUS at 09:50

## 2025-07-25 RX ADMIN — SODIUM CHLORIDE 3000 MG: 9 INJECTION, SOLUTION INTRAVENOUS at 09:59

## 2025-07-25 RX ADMIN — SODIUM CHLORIDE 3000 MG: 9 INJECTION, SOLUTION INTRAVENOUS at 15:18

## 2025-07-25 NOTE — DISCHARGE SUMMARY
V2.0  Discharge Summary    Name:  Honorio Colindres /Age/Sex: 1955 (69 y.o. male)   Admit Date: 2025  Discharge Date: 25    MRN & CSN:  [id number] & [id number] Encounter Date and Time 25 3:05 PM EDT    Attending:  Nicky Chacko MD Discharging Provider: NICKY CHACKO MD       Hospital Course:     Brief HPI: Honorio Colindres is a 69 y.o. male who presented with left hand weakness and was seen in the ED, and was admitted.    Brief Problem Based Course:     Acute multifocal strokes  -Mechanism of stroke is possibly embolic  -MRI of the brain done while here showed acute infarcts in the right MCA territory and a small area of in the right frontal lobe suggestive of a subacute infarct-radiologist recommends a follow-up MRI brain of this area in 3 to 6 months for reevaluation  - MRI of the brain done while here also shows old lacunar infarcts in the bilateral corona radiata  - Moderate white matter changes likely reflective of chronic microangiopathy also seen on MRI this admission  - CT head and neck with no LVO  Statin: Atorvastatin 40 mg daily started this admission, he was not on a statin at home, LDL is 110 at this time, and goal is less than 70  -Eliquis 5 mg twice daily started this admission-see discussion below  - Eliquis coupon given to him-1 month supply and per pharmacist Brijesh will be about a $190 a month after that -he may need further assistance as an outpatient.  Insurance is listed as a [employer].    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 IMPRESSION: 1.  No acute CTA abnormality. 2.  The imaged ascending thoracic aorta is mildly aneurysmal measuring approximately 4 cm in diameter. 3.  Multiple missing teeth with multiple teeth demonstrating periapical lucencies most suggestive of periapical abscesses. 4.  Near-complete opacification of the right sphenoid sinus. Please see above for additional details and findings.  Dictated and Electronically Signed By: Edgardo Medley [facility] 7/20/2025 15:11        CT HEAD WO CONTRAST  Result Date: 7/20/2025  PROCEDURE: CT HEAD WO CONTRAST, 7/20/2025 14:41 DEMOGRAPHICS: 69 years old Male INDICATION: Stroke Symptoms COMPARISON: No existing relevant imaging study corresponding to the same anatomical region is available. TECHNIQUE: Multiple contiguous axial images were obtained from the vertex to the  skull base. The topogram image was reviewed. CT radiation dose optimization techniques (automated exposure control, use of iterative reconstruction techniques, or adjustment of the mA or kV according to patient size) were used to limit patient radiation dose. FINDINGS: There is no intra-or extra-axial hyperdense fluid collection. Small transcortical hypodensity in the right frontal lobe as seen on axial image  22 series 2. There is no midline shift or mass effect. Cerebral atrophy is noted. The ventricular system is symmetric. Nonspecific hypodensities throughout the periventricular white matter may reflect changes due to small vessel ischemic disease. The overlying soft tissues appear within normal limits.  The visualized paranasal sinuses appear clear. The mastoid air cells appear grossly symmetric and clear. The intraorbital structures are symmetric. IMPRESSION: 1.  Small area of transcortical hypodensity in the right frontal lobe, and may indicate subacute infarct. Further evaluation with MRI of the head is recommended. These findings were discussed

## 2025-07-25 NOTE — CONSULTS
Session ID: [id number]  Session Duration: 4 minutes  Language: [place] Quintanilla   ID: #[id number]   Name: Tacos

## 2025-07-25 NOTE — PROGRESS NOTES
Today's plan: As per CT surgery started on anticoagulation Eliquis 5 mg twice daily for LVOT healed calcification I will sign off      Admit Date:  7/20/2025    Subjective: Okay      Chief complaints on admission  Chief Complaint   Patient presents with    Anxiety     Feeling uneasy     Shaking     Left hand          History of present illness:Honorio is a 69 y.o.year old who  presents with had concerns including Anxiety (Feeling uneasy ) and Shaking (Left hand ).      Past medical history:    has a past medical history of Hypertension.  Past surgical history:   has a past surgical history that includes Abdomen surgery and Colonoscopy (N/A, 6/23/2022).  Social History:   reports that he has never smoked. He has never used smokeless tobacco. He reports that he does not currently use alcohol. He reports that he does not use drugs.  Family history:  family history is not on file.    No Known Allergies      Objective:   BP (!) 141/82   Pulse 57   Temp 98.6 °F (37 °C) (Oral)   Resp 16   Ht 1.67 m (5' 5.75\")   Wt 73.2 kg (161 lb 6.4 oz)   SpO2 100%   BMI 26.25 kg/m²     Intake/Output Summary (Last 24 hours) at 7/25/2025 1518  Last data filed at 7/24/2025 2052  Gross per 24 hour   Intake --   Output 1 ml   Net -1 ml       TELEMETRY:     Physical Exam:  Constitutional:  Well developed, Well nourished, No acute distress, Non-toxic appearance.   HENT:  Normocephalic, Atraumatic, Bilateral external ears normal, Oropharynx moist, No oral exudates, Nose normal. Neck- Normal range of motion, No tenderness, Supple, No stridor.   Eyes:  PERRL, EOMI, Conjunctiva normal, No discharge.   Respiratory:  Normal breath sounds, No respiratory distress, No wheezing, No chest tenderness.,no use of accessory muscles, diaphragm movement is normal  Cardiovascular: (PMI) apex non displaced,no lifts no thrills, no s3,no s4, Normal heart rate, Normal rhythm, No murmurs, No rubs, No gallops. Carotid arteries pulse and amplitude are normal

## 2025-07-25 NOTE — PROGRESS NOTES
V2.0  [facility] [place] Hospital[place] Progress Note      Name:  Honorio Colindres /Age/Sex: 1955  (69 y.o. male)   MRN & CSN:  [id number] & [id number] Encounter Date/Time: 2025 11:57 AM EDT    Location:  [facility]-A PCP: No primary care provider on file.       Hospital Day: 5    Assessment and Plan:   Honorio Colindres is a 69 y.o. 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   - Eliquis started  per CT surgery recommendation  - ID consult appreciated, as there was some concern for a possible vegetation/endocarditis, ID following    Periapical dental abscess  - IV Unasyn started    Benign essential hypertension  BPH  No PCP    Diet ADULT DIET; Regular   DVT Prophylaxis [] Lovenox, []  Heparin, [] SCDs, [] Ambulation,  [] Eliquis, [] Xarelto  [] Coumadin   Code Status Full Code   Disposition From: home  Expected Disposition: home  Estimated Date of Discharge: TBD  Patient requires continued admission due to pending neuro clearance   Surrogate Decision Maker/ POA      Subjective:     Chief Complaint: Anxiety (Feeling uneasy ) and Shaking (Left hand )     Discussed via online .  Explained the plan.  Discussed that he has been started on Eliquis and Unasyn.  He denied complaints.    Review of Systems:    Review of Systems    Negative except above  Objective:     Intake/Output Summary (Last 24 hours) at 2025  Last data filed at 2025  Gross per 24 hour   Intake --   Output 1 ml   Net -1 ml        Vitals:   Vitals:    25   BP: (!) 154/97   Pulse: 72   Resp: 19   Temp: 97.7 °F (36.5 °C)   SpO2: 100%       Physical Exam:     Physical Exam  Constitutional:       Appearance: He is not ill-appearing.   Pulmonary:      Effort: No respiratory distress.      Breath sounds: No stridor.   Skin:     Coloration: Skin is not jaundiced.  for additional details and findings.  Dictated and Electronically Signed By: Fatou Bill [facility] 7/20/2025 14:52          Electronically signed by NICKY CHACKO MD on 7/24/2025 at 9:25 PM

## 2025-07-25 NOTE — PLAN OF CARE
Problem: Chronic Conditions and Co-morbidities  Goal: Patient's chronic conditions and co-morbidity symptoms are monitored and maintained or improved  7/25/2025 1243 by Julianna Pan RN ([place])  Outcome: Progressing  7/25/2025 0026 by Ave Cavazos RN  Outcome: Progressing  Flowsheets (Taken 7/24/2025 2000)  Care Plan - Patient's Chronic Conditions and Co-Morbidity Symptoms are Monitored and Maintained or Improved:   Monitor and assess patient's chronic conditions and comorbid symptoms for stability, deterioration, or improvement   Collaborate with multidisciplinary team to address chronic and comorbid conditions and prevent exacerbation or deterioration   Update acute care plan with appropriate goals if chronic or comorbid symptoms are exacerbated and prevent overall improvement and discharge     Problem: Discharge Planning  Goal: Discharge to home or other facility with appropriate resources  7/25/2025 1243 by Julianna Pan RN ([place])  Outcome: Progressing  7/25/2025 0026 by Ave Cavazos RN  Outcome: Progressing  Flowsheets (Taken 7/24/2025 2000)  Discharge to home or other facility with appropriate resources:   Identify barriers to discharge with patient and caregiver   Arrange for needed discharge resources and transportation as appropriate   Identify discharge learning needs (meds, wound care, etc)     Problem: Neurosensory - Adult  Goal: Achieves stable or improved neurological status  7/25/2025 1243 by Julianna Pan RN ([place])  Outcome: Progressing  7/25/2025 0026 by Ave Cavazos RN  Outcome: Progressing  Flowsheets (Taken 7/24/2025 2000)  Achieves stable or improved neurological status:   Assess for and report changes in neurological status   Initiate measures to prevent increased intracranial pressure   Maintain blood pressure and fluid volume within ordered parameters to optimize cerebral perfusion and minimize risk of hemorrhage   Monitor temperature, glucose,

## 2025-07-25 NOTE — CARE COORDINATION
LSW received a consult for pt needing information on dentist.  LSW went to pt's insurance in-network dentist and placed them on his AVS.  LSW also placed area and food resources on AVS in pt's spoken language.

## 2025-07-25 NOTE — PROGRESS NOTES
V2.0  [facility] Hospitalist Progress Note      Name:  Honorio Colindres /Age/Sex: 1955  (69 y.o. male)   MRN & CSN:  [id number] & [id number] Encounter Date/Time: 2025 11:57 AM EDT    Location:  [address] [place] [address]-A PCP: No primary care provider on file.       Hospital Day: 6    Assessment and Plan:   Honorio Colindres is a 69 y.o. 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   - Eliquis started  per CT surgery recommendation  - ID consult appreciated, as there was some concern for a possible vegetation/endocarditis, ID following    Periapical dental abscess  - IV Unasyn started    Benign essential hypertension  BPH  No PCP    Diet ADULT DIET; Regular   DVT Prophylaxis [] Lovenox, []  Heparin, [] SCDs, [] Ambulation,  [] Eliquis, [] Xarelto  [] Coumadin   Code Status Full Code   Disposition From: home  Expected Disposition: home  Estimated Date of Discharge: TBD  Patient requires continued admission due to pending neuro clearance   Surrogate Decision Maker/ POA      Subjective:     Chief Complaint: Anxiety (Feeling uneasy ) and Shaking (Left hand )     Discussed via online .  Explained the plan.  Discussed that he has been started on Eliquis and Unasyn.  He denied complaints.    Review of Systems:    Review of Systems    Negative except above  Objective:     Intake/Output Summary (Last 24 hours) at 2025 1505  Last data filed at 2025  Gross per 24 hour   Intake --   Output 1 ml   Net -1 ml        Vitals:   Vitals:    25 0947   BP:    Pulse: 57   Resp:    Temp:    SpO2:        Physical Exam:     Physical Exam  Constitutional:       Appearance: He is not ill-appearing.   Pulmonary:      Effort: No respiratory distress.      Breath sounds: No stridor.   Skin:     Coloration: Skin is not jaundiced.       Medications:   Medications:    apixaban  5

## 2025-07-25 NOTE — PROGRESS NOTES
They will give him 1 month free Eliquis, and next month it is $190 a month with his insurance plan, but not sure what else to do, Aishwarya, Jul 24, 2025, 12:28:49 PM

## 2025-07-25 NOTE — PLAN OF CARE
Problem: Chronic Conditions and Co-morbidities  Goal: Patient's chronic conditions and co-morbidity symptoms are monitored and maintained or improved  Outcome: Progressing  Flowsheets (Taken 7/24/2025 2000)  Care Plan - Patient's Chronic Conditions and Co-Morbidity Symptoms are Monitored and Maintained or Improved:   Monitor and assess patient's chronic conditions and comorbid symptoms for stability, deterioration, or improvement   Collaborate with multidisciplinary team to address chronic and comorbid conditions and prevent exacerbation or deterioration   Update acute care plan with appropriate goals if chronic or comorbid symptoms are exacerbated and prevent overall improvement and discharge     Problem: Discharge Planning  Goal: Discharge to home or other facility with appropriate resources  Outcome: Progressing  Flowsheets (Taken 7/24/2025 2000)  Discharge to home or other facility with appropriate resources:   Identify barriers to discharge with patient and caregiver   Arrange for needed discharge resources and transportation as appropriate   Identify discharge learning needs (meds, wound care, etc)     Problem: Neurosensory - Adult  Goal: Achieves stable or improved neurological status  Outcome: Progressing  Flowsheets (Taken 7/24/2025 2000)  Achieves stable or improved neurological status:   Assess for and report changes in neurological status   Initiate measures to prevent increased intracranial pressure   Maintain blood pressure and fluid volume within ordered parameters to optimize cerebral perfusion and minimize risk of hemorrhage   Monitor temperature, glucose, and sodium. Initiate appropriate interventions as ordered  Goal: Achieves maximal functionality and self care  Outcome: Progressing  Flowsheets (Taken 7/24/2025 2000)  Achieves maximal functionality and self care:   Monitor swallowing and airway patency with patient fatigue and changes in neurological status   Encourage and assist patient to

## 2025-07-25 NOTE — PROGRESS NOTES
Outpatient Pharmacy Progress Note for Meds-to-Beds    Total number of Prescriptions Filled: 7    Additional Documentation:  Medication(s) were delivered to the patient's room prior to discharge      Thank you for letting us serve your patients.  [address] [address]    Phone: [phone]    Fax: [phone]

## 2025-07-25 NOTE — PROGRESS NOTES
Infectious Disease Progress Note  2025   Patient Name: Honorio Colindres : 1955     Assessment  LV vegetation  Pt admitted for management of Acute CVA. CLARISA showed healed calcified mass like-vegetation structure of the left ventricle interventricular septum. CTS evaluated and does not recommend surgical intervention given likely organized thrombus. ID consulted for concern of IE  Blood cx x2 NGTD. Pt remains Af, Pro-carlee not elevated and CRP without significant elevation at 12.4.  Imaging: MRI Brain Acute right MCA territory infarct and subacute right frontal lobe infart . CT head reviewed. CTA head/neck  non acute, mildly aneurysmal ascending thoracic aorta, multiple missing teeth with multiple teeth demonstrating periapical lucensies most suggestive of periapical abscess, near complete opacification of the right sphenoid sinus  Micro data: Blood cx x2 NGTD   WBC 4.4-no leukocytosis since adm, CRP 12.4<--13, Procal .08. Afebrile since adm  Antimicrobial summary: IV Unasyn -;     Possible periapical abscess  Noted on CTA head/neck  Acute right MCA territory CVA with subacute right frontal lobe infarct  Neurology evaluated  HTN  Allergy: No antibiotic allergy  CrCl 51 ml/min  Comorbid conditions: HTN BPH s/p TURP 3/2025, CKD II/III, GERD     Plan  Therapeutic: Ok to discharge from ID standpoint when medically ready  Continue IV Unasyn 3gm q6 hrs can transition to Augmentin 875/125mg po q12hrs for a cumulative 7 day course for periapical abscess, EOT 25  Diagnostic:   Trend CRP, CBC  Other:   Recommend OP Dental Evaluation  Recommend OP f/u with PCP, Neurology and Cardiology recommended    After discharge the following should be done:  Weekly labs drawn on Monday during the course of treatment  CBC with differential, CMP, ESR, CRP  Fax results to Attn: [facility]  # [phone]  See in clinic within one week after discharge    Reason for visit: F/u LV  aneurysmal measuring approximately 4 cm in diameter. 3.  Multiple missing teeth with multiple teeth demonstrating periapical lucencies most suggestive of periapical abscesses. 4.  Near-complete opacification of the right sphenoid sinus. Please see above for additional details and findings.  Dictated and Electronically Signed By: Edgardo Medley DO [facility] 7/20/2025 15:11        CT HEAD WO CONTRAST  Result Date: 7/20/2025  PROCEDURE: CT HEAD WO CONTRAST, 7/20/2025 14:41 DEMOGRAPHICS: 69 years old Male INDICATION: Stroke Symptoms COMPARISON: No existing relevant imaging study corresponding to the same anatomical region is available. TECHNIQUE: Multiple contiguous axial images were obtained from the vertex to the  skull base. The topogram image was reviewed. CT radiation dose optimization techniques (automated exposure control, use of iterative reconstruction techniques, or adjustment of the mA or kV according to patient size) were used to limit patient radiation dose. FINDINGS: There is no intra-or extra-axial hyperdense fluid collection. Small transcortical hypodensity in the right frontal lobe as seen on axial image  22 series 2. There is no midline shift or mass effect. Cerebral atrophy is noted. The ventricular system is symmetric. Nonspecific hypodensities throughout the periventricular white matter may reflect changes due to small vessel ischemic disease. The overlying soft tissues appear within normal limits.  The visualized paranasal sinuses appear clear. The mastoid air cells appear grossly symmetric and clear. The intraorbital structures are symmetric. IMPRESSION: 1.  Small area of transcortical hypodensity in the right frontal lobe, and may indicate subacute infarct. Further evaluation with MRI of the head is recommended. These findings were discussed with JOHNATHON ALVARADO at 7/20/2025 14:51 EST. Please note if acute stroke suspected CT is relatively insensitive. MRI with

## 2025-07-25 NOTE — CONSULTS
Session ID: [id number]  Session Duration: 21 minutes  Language: Amanda Quintanilla   ID: #[id number]   Name: Julio

## 2025-07-25 NOTE — CONSULTS
Session ID: [id number]  Session Duration: 4 minutes  Language: [place] Quintan[place]   ID: #[id number]   Name: Junior Rodlan

## 2025-07-27 LAB
MICROORGANISM SPEC CULT: NORMAL
MICROORGANISM SPEC CULT: NORMAL
SERVICE CMNT-IMP: NORMAL
SERVICE CMNT-IMP: NORMAL
SPECIMEN DESCRIPTION: NORMAL
SPECIMEN DESCRIPTION: NORMAL

## 2025-08-07 ENCOUNTER — OFFICE VISIT (OUTPATIENT)
Age: 70
End: 2025-08-07
Payer: COMMERCIAL

## 2025-08-07 ENCOUNTER — TELEPHONE (OUTPATIENT)
Dept: CARDIOLOGY CLINIC | Age: 70
End: 2025-08-07

## 2025-08-07 VITALS
BODY MASS INDEX: 26.02 KG/M2 | DIASTOLIC BLOOD PRESSURE: 84 MMHG | HEART RATE: 60 BPM | WEIGHT: 160 LBS | OXYGEN SATURATION: 99 % | SYSTOLIC BLOOD PRESSURE: 142 MMHG

## 2025-08-07 DIAGNOSIS — I33.0 HEART VALVE VEGETATION: ICD-10-CM

## 2025-08-07 DIAGNOSIS — I69.30 SEQUELA OF CEREBROVASCULAR ACCIDENT: Primary | ICD-10-CM

## 2025-08-07 DIAGNOSIS — R93.89 ABNORMAL MRI: ICD-10-CM

## 2025-08-07 DIAGNOSIS — R42 DIZZINESS: ICD-10-CM

## 2025-08-07 PROCEDURE — G8419 CALC BMI OUT NRM PARAM NOF/U: HCPCS | Performed by: NURSE PRACTITIONER

## 2025-08-07 PROCEDURE — 3017F COLORECTAL CA SCREEN DOC REV: CPT | Performed by: NURSE PRACTITIONER

## 2025-08-07 PROCEDURE — 1123F ACP DISCUSS/DSCN MKR DOCD: CPT | Performed by: NURSE PRACTITIONER

## 2025-08-07 PROCEDURE — 3077F SYST BP >= 140 MM HG: CPT | Performed by: NURSE PRACTITIONER

## 2025-08-07 PROCEDURE — G8427 DOCREV CUR MEDS BY ELIG CLIN: HCPCS | Performed by: NURSE PRACTITIONER

## 2025-08-07 PROCEDURE — 3079F DIAST BP 80-89 MM HG: CPT | Performed by: NURSE PRACTITIONER

## 2025-08-07 PROCEDURE — 1111F DSCHRG MED/CURRENT MED MERGE: CPT | Performed by: NURSE PRACTITIONER

## 2025-08-07 PROCEDURE — 99205 OFFICE O/P NEW HI 60 MIN: CPT | Performed by: NURSE PRACTITIONER

## 2025-08-07 PROCEDURE — 1036F TOBACCO NON-USER: CPT | Performed by: NURSE PRACTITIONER

## 2025-09-05 ENCOUNTER — OFFICE VISIT (OUTPATIENT)
Dept: CARDIOLOGY CLINIC | Age: 70
End: 2025-09-05
Payer: COMMERCIAL

## 2025-09-05 VITALS
SYSTOLIC BLOOD PRESSURE: 190 MMHG | HEART RATE: 56 BPM | DIASTOLIC BLOOD PRESSURE: 106 MMHG | WEIGHT: 162.8 LBS | BODY MASS INDEX: 26.48 KG/M2

## 2025-09-05 DIAGNOSIS — R42 DIZZINESS: ICD-10-CM

## 2025-09-05 DIAGNOSIS — I10 PRIMARY HYPERTENSION: ICD-10-CM

## 2025-09-05 DIAGNOSIS — I69.30 SEQUELA OF CEREBROVASCULAR ACCIDENT: Primary | ICD-10-CM

## 2025-09-05 DIAGNOSIS — E78.5 DYSLIPIDEMIA: ICD-10-CM

## 2025-09-05 PROCEDURE — 1123F ACP DISCUSS/DSCN MKR DOCD: CPT | Performed by: INTERNAL MEDICINE

## 2025-09-05 PROCEDURE — 99214 OFFICE O/P EST MOD 30 MIN: CPT | Performed by: INTERNAL MEDICINE

## 2025-09-05 PROCEDURE — 1036F TOBACCO NON-USER: CPT | Performed by: INTERNAL MEDICINE

## 2025-09-05 PROCEDURE — 3017F COLORECTAL CA SCREEN DOC REV: CPT | Performed by: INTERNAL MEDICINE

## 2025-09-05 PROCEDURE — G8428 CUR MEDS NOT DOCUMENT: HCPCS | Performed by: INTERNAL MEDICINE

## 2025-09-05 PROCEDURE — 3080F DIAST BP >= 90 MM HG: CPT | Performed by: INTERNAL MEDICINE

## 2025-09-05 PROCEDURE — 3077F SYST BP >= 140 MM HG: CPT | Performed by: INTERNAL MEDICINE

## 2025-09-05 PROCEDURE — G8419 CALC BMI OUT NRM PARAM NOF/U: HCPCS | Performed by: INTERNAL MEDICINE

## 2025-09-05 RX ORDER — AMLODIPINE BESYLATE 5 MG/1
5 TABLET ORAL DAILY
Qty: 90 TABLET | Refills: 1 | Status: SHIPPED | OUTPATIENT
Start: 2025-09-05

## (undated) DEVICE — ENDOSCOPY KIT: Brand: MEDLINE INDUSTRIES, INC.

## (undated) DEVICE — STERILE POLYISOPRENE POWDER-FREE SURGICAL GLOVES: Brand: PROTEXIS

## (undated) DEVICE — 3M™ STERI-STRIP™ COMPOUND BENZOIN TINCTURE 40 BAGS/CARTON 4 CARTONS/CASE C1544: Brand: 3M™ STERI-STRIP™

## (undated) DEVICE — ELECTRODE ES AD CRDLSS PT RET REM POLYHESIVE

## (undated) DEVICE — SYRINGE CATH TIP 50ML

## (undated) DEVICE — DBD-PACK,CYSTOSCOPY,PK VI,AURORA: Brand: MEDLINE

## (undated) DEVICE — TUBING, SUCTION, 9/32" X 10', STRAIGHT: Brand: MEDLINE

## (undated) DEVICE — Z DISCONTINUED NO SUB IDED TUBING ETCO2 AD L6.5FT NSL ORAL CVD PRNG NONFLARED TIP OVR

## (undated) DEVICE — MAT FLOOR ULTRA ABS 28X48IN

## (undated) DEVICE — MARKER SURG SKIN UTIL REGULAR/FINE 2 TIP W/ RUL AND 9 LBL

## (undated) DEVICE — TRAY PREP DRY W/ PREM GLV 2 APPL 6 SPNG 2 UNDPD 1 OVERWRAP

## (undated) DEVICE — DRAINBAG,ANTI-REFLUX TOWER,L/F,2000ML,LL: Brand: MEDLINE

## (undated) DEVICE — JELLY,LUBE,STERILE,FLIP TOP,TUBE,2-OZ: Brand: MEDLINE

## (undated) DEVICE — 3M™ RANGER™ BLOOD/FLUID WARMING HIGH FLOW SET, 24370, 10/CASE: Brand: 3M™ RANGER™